# Patient Record
Sex: FEMALE | Race: WHITE | NOT HISPANIC OR LATINO | Employment: FULL TIME | URBAN - METROPOLITAN AREA
[De-identification: names, ages, dates, MRNs, and addresses within clinical notes are randomized per-mention and may not be internally consistent; named-entity substitution may affect disease eponyms.]

---

## 2016-09-27 LAB — CRC RECOMMENDATION EXT: NORMAL

## 2017-03-27 ENCOUNTER — HISTORICAL (OUTPATIENT)
Dept: ADMINISTRATIVE | Facility: HOSPITAL | Age: 57
End: 2017-03-27

## 2017-06-12 ENCOUNTER — HISTORICAL (OUTPATIENT)
Dept: RADIOLOGY | Facility: HOSPITAL | Age: 57
End: 2017-06-12

## 2017-07-23 ENCOUNTER — HOSPITAL ENCOUNTER (OUTPATIENT)
Dept: OCCUPATIONAL THERAPY | Facility: HOSPITAL | Age: 57
End: 2017-07-24
Attending: INTERNAL MEDICINE | Admitting: INTERNAL MEDICINE

## 2017-08-11 ENCOUNTER — HISTORICAL (OUTPATIENT)
Dept: ADMINISTRATIVE | Facility: HOSPITAL | Age: 57
End: 2017-08-11

## 2017-09-19 ENCOUNTER — HISTORICAL (OUTPATIENT)
Dept: ADMINISTRATIVE | Facility: HOSPITAL | Age: 57
End: 2017-09-19

## 2017-09-27 ENCOUNTER — HISTORICAL (OUTPATIENT)
Dept: ADMINISTRATIVE | Facility: HOSPITAL | Age: 57
End: 2017-09-27

## 2018-09-26 ENCOUNTER — HISTORICAL (OUTPATIENT)
Dept: ADMINISTRATIVE | Facility: HOSPITAL | Age: 58
End: 2018-09-26

## 2018-09-26 LAB
ABS NEUT (OLG): 3.56 X10(3)/MCL (ref 2.1–9.2)
ALBUMIN SERPL-MCNC: 3.7 GM/DL (ref 3.4–5)
ALBUMIN/GLOB SERPL: 1.2 RATIO (ref 1.1–2)
ALP SERPL-CCNC: 87 UNIT/L (ref 38–126)
ALT SERPL-CCNC: 28 UNIT/L (ref 12–78)
AST SERPL-CCNC: 12 UNIT/L (ref 15–37)
BASOPHILS # BLD AUTO: 0 X10(3)/MCL (ref 0–0.2)
BASOPHILS NFR BLD AUTO: 1 %
BILIRUB SERPL-MCNC: 1.1 MG/DL (ref 0.2–1)
BILIRUBIN DIRECT+TOT PNL SERPL-MCNC: 0.3 MG/DL (ref 0–0.5)
BILIRUBIN DIRECT+TOT PNL SERPL-MCNC: 0.8 MG/DL (ref 0–0.8)
BUN SERPL-MCNC: 20 MG/DL (ref 7–18)
CALCIUM SERPL-MCNC: 8.7 MG/DL (ref 8.5–10.1)
CHLORIDE SERPL-SCNC: 104 MMOL/L (ref 98–107)
CHOLEST SERPL-MCNC: 139 MG/DL (ref 0–200)
CHOLEST/HDLC SERPL: 2.7 {RATIO} (ref 0–4)
CO2 SERPL-SCNC: 30 MMOL/L (ref 21–32)
CREAT SERPL-MCNC: 0.53 MG/DL (ref 0.55–1.02)
CREAT UR-MCNC: 136 MG/DL
EOSINOPHIL # BLD AUTO: 0.2 X10(3)/MCL (ref 0–0.9)
EOSINOPHIL NFR BLD AUTO: 2 %
ERYTHROCYTE [DISTWIDTH] IN BLOOD BY AUTOMATED COUNT: 14.8 % (ref 11.5–17)
EST. AVERAGE GLUCOSE BLD GHB EST-MCNC: 117 MG/DL
GLOBULIN SER-MCNC: 3 GM/DL (ref 2.4–3.5)
GLUCOSE SERPL-MCNC: 111 MG/DL (ref 74–106)
HBA1C MFR BLD: 5.7 % (ref 4.2–6.3)
HCT VFR BLD AUTO: 41.4 % (ref 37–47)
HDLC SERPL-MCNC: 51 MG/DL (ref 35–60)
HGB BLD-MCNC: 13.2 GM/DL (ref 12–16)
LDLC SERPL CALC-MCNC: 68 MG/DL (ref 0–129)
LYMPHOCYTES # BLD AUTO: 2.2 X10(3)/MCL (ref 0.6–4.6)
LYMPHOCYTES NFR BLD AUTO: 34 %
MCH RBC QN AUTO: 28.3 PG (ref 27–31)
MCHC RBC AUTO-ENTMCNC: 31.9 GM/DL (ref 33–36)
MCV RBC AUTO: 88.7 FL (ref 80–94)
MICROALBUMIN UR-MCNC: 2 MG/DL
MICROALBUMIN/CREAT RATIO PNL UR: 14.7 MG/GM CR (ref 0–30)
MONOCYTES # BLD AUTO: 0.6 X10(3)/MCL (ref 0.1–1.3)
MONOCYTES NFR BLD AUTO: 9 %
NEUTROPHILS # BLD AUTO: 3.56 X10(3)/MCL (ref 1.4–7.9)
NEUTROPHILS NFR BLD AUTO: 54 %
PLATELET # BLD AUTO: 244 X10(3)/MCL (ref 130–400)
PMV BLD AUTO: 8.9 FL (ref 9.4–12.4)
POTASSIUM SERPL-SCNC: 4.2 MMOL/L (ref 3.5–5.1)
PROT SERPL-MCNC: 6.7 GM/DL (ref 6.4–8.2)
RBC # BLD AUTO: 4.67 X10(6)/MCL (ref 4.2–5.4)
SODIUM SERPL-SCNC: 141 MMOL/L (ref 136–145)
TRIGL SERPL-MCNC: 98 MG/DL (ref 30–150)
TSH SERPL-ACNC: 1.48 MIU/L (ref 0.36–3.74)
VLDLC SERPL CALC-MCNC: 20 MG/DL
WBC # SPEC AUTO: 6.6 X10(3)/MCL (ref 4.5–11.5)

## 2020-08-13 ENCOUNTER — HISTORICAL (OUTPATIENT)
Dept: ADMINISTRATIVE | Facility: HOSPITAL | Age: 60
End: 2020-08-13

## 2020-10-02 ENCOUNTER — HISTORICAL (OUTPATIENT)
Dept: ADMINISTRATIVE | Facility: HOSPITAL | Age: 60
End: 2020-10-02

## 2021-01-18 LAB — BCS RECOMMENDATION EXT: NORMAL

## 2021-08-23 ENCOUNTER — HISTORICAL (OUTPATIENT)
Dept: LAB | Facility: HOSPITAL | Age: 61
End: 2021-08-23

## 2022-01-24 LAB
PAP RECOMMENDATION EXT: NORMAL
PAP SMEAR: NORMAL

## 2022-02-16 ENCOUNTER — HISTORICAL (OUTPATIENT)
Dept: LAB | Facility: HOSPITAL | Age: 62
End: 2022-02-16

## 2022-02-16 LAB
ABS NEUT (OLG): 4.05 (ref 2.1–9.2)
ALBUMIN SERPL-MCNC: 3.9 G/DL (ref 3.4–4.8)
ALBUMIN/GLOB SERPL: 1.5 {RATIO} (ref 1.1–2)
ALP SERPL-CCNC: 90 U/L (ref 40–150)
ALT SERPL-CCNC: 41 U/L (ref 0–55)
AST SERPL-CCNC: 26 U/L (ref 5–34)
BILIRUB SERPL-MCNC: 1 MG/DL (ref 0.2–1.2)
BILIRUBIN DIRECT+TOT PNL SERPL-MCNC: 0.4 (ref 0–0.5)
BILIRUBIN DIRECT+TOT PNL SERPL-MCNC: 0.6 (ref 0–0.8)
BUN SERPL-MCNC: 19.2 MG/DL (ref 9.8–20.1)
CALCIUM SERPL-MCNC: 9 MG/DL (ref 8.4–10.2)
CHLORIDE SERPL-SCNC: 106 MMOL/L (ref 98–107)
CHOLEST SERPL-MCNC: 135 MG/DL
CHOLEST/HDLC SERPL: 3 {RATIO} (ref 0–5)
CO2 SERPL-SCNC: 28 MMOL/L (ref 23–31)
CREAT SERPL-MCNC: 0.62 MG/DL (ref 0.57–1.11)
ERYTHROCYTE [DISTWIDTH] IN BLOOD BY AUTOMATED COUNT: 13.2 % (ref 11.5–17)
EST. AVERAGE GLUCOSE BLD GHB EST-MCNC: 122.6 MG/DL
GLOBULIN SER-MCNC: 2.6 G/DL (ref 2.4–3.5)
GLUCOSE SERPL-MCNC: 127 MG/DL (ref 82–115)
HBA1C MFR BLD: 5.9 %
HCT VFR BLD AUTO: 41.1 % (ref 37–47)
HDLC SERPL-MCNC: 45 MG/DL (ref 40–60)
HEMOLYSIS INTERF INDEX SERPL-ACNC: 2
HGB BLD-MCNC: 13.2 G/DL (ref 12–16)
ICTERIC INTERF INDEX SERPL-ACNC: 1
LDLC SERPL CALC-MCNC: 70 MG/DL (ref 50–140)
LIPEMIC INTERF INDEX SERPL-ACNC: -2
MCH RBC QN AUTO: 28.6 PG (ref 27–31)
MCHC RBC AUTO-ENTMCNC: 32.1 G/DL (ref 33–36)
MCV RBC AUTO: 89.2 FL (ref 80–94)
NRBC BLD AUTO-RTO: 0 % (ref 0–0.2)
PLATELET # BLD AUTO: 214 10*3/UL (ref 130–400)
PMV BLD AUTO: 9.2 FL (ref 7.4–10.4)
POTASSIUM SERPL-SCNC: 4.4 MMOL/L (ref 3.5–5.1)
PROT SERPL-MCNC: 6.5 G/DL (ref 5.8–7.6)
RBC # BLD AUTO: 4.61 10*6/UL (ref 4.2–5.4)
SODIUM SERPL-SCNC: 143 MMOL/L (ref 136–145)
TRIGL SERPL-MCNC: 101 MG/DL (ref 0–150)
VLDLC SERPL CALC-MCNC: 20 MG/DL
WBC # SPEC AUTO: 7.4 10*3/UL (ref 4.5–11.5)

## 2022-04-07 ENCOUNTER — HISTORICAL (OUTPATIENT)
Dept: ADMINISTRATIVE | Facility: HOSPITAL | Age: 62
End: 2022-04-07
Payer: COMMERCIAL

## 2022-04-12 ENCOUNTER — HISTORICAL (OUTPATIENT)
Dept: LAB | Facility: HOSPITAL | Age: 62
End: 2022-04-12

## 2022-04-12 LAB
ABS NEUT (OLG): 3.93 (ref 2.1–9.2)
ALBUMIN SERPL-MCNC: 4 G/DL (ref 3.4–4.8)
ALBUMIN/GLOB SERPL: 1.4 {RATIO} (ref 1.1–2)
ALP SERPL-CCNC: 86 U/L (ref 40–150)
ALT SERPL-CCNC: 41 U/L (ref 0–55)
AST SERPL-CCNC: 26 U/L (ref 5–34)
BASOPHILS # BLD AUTO: 0.04 10*3/UL (ref 0–0.2)
BASOPHILS NFR BLD AUTO: 0.5 % (ref 0–1)
BILIRUB SERPL-MCNC: 0.9 MG/DL (ref 0.2–1.2)
BILIRUBIN DIRECT+TOT PNL SERPL-MCNC: 0.4 (ref 0–0.5)
BILIRUBIN DIRECT+TOT PNL SERPL-MCNC: 0.5 (ref 0–0.8)
BUN SERPL-MCNC: 21.1 MG/DL (ref 9.8–20.1)
CALCIUM SERPL-MCNC: 9.7 MG/DL (ref 8.4–10.2)
CHLORIDE SERPL-SCNC: 105 MMOL/L (ref 98–107)
CHOLEST SERPL-MCNC: 146 MG/DL
CHOLEST/HDLC SERPL: 3 {RATIO} (ref 0–5)
CO2 SERPL-SCNC: 28 MMOL/L (ref 23–31)
CREAT SERPL-MCNC: 0.78 MG/DL (ref 0.57–1.11)
EOSINOPHIL # BLD AUTO: 0.22 10*3/UL (ref 0–0.9)
EOSINOPHIL NFR BLD AUTO: 3 % (ref 0–6.4)
ERYTHROCYTE [DISTWIDTH] IN BLOOD BY AUTOMATED COUNT: 13.3 % (ref 11.5–17)
EST. AVERAGE GLUCOSE BLD GHB EST-MCNC: 131.2 MG/DL
GLOBULIN SER-MCNC: 2.9 G/DL (ref 2.4–3.5)
GLUCOSE SERPL-MCNC: 129 MG/DL (ref 82–115)
HBA1C MFR BLD: 6.2 %
HCT VFR BLD AUTO: 41.4 % (ref 37–47)
HDLC SERPL-MCNC: 49 MG/DL (ref 40–60)
HEMOLYSIS INTERF INDEX SERPL-ACNC: 5
HGB BLD-MCNC: 13.4 G/DL (ref 12–16)
ICTERIC INTERF INDEX SERPL-ACNC: 1
IMM GRANULOCYTES # BLD AUTO: 0.02 10*3/UL (ref 0–0.02)
IMM GRANULOCYTES NFR BLD AUTO: 0.3 % (ref 0–0.43)
LDLC SERPL CALC-MCNC: 73 MG/DL (ref 50–140)
LIPEMIC INTERF INDEX SERPL-ACNC: 3
LYMPHOCYTES # BLD AUTO: 2.57 10*3/UL (ref 0.6–4.6)
LYMPHOCYTES NFR BLD AUTO: 34.8 % (ref 16–44)
MANUAL DIFF? (OHS): NO
MCH RBC QN AUTO: 28.3 PG (ref 27–31)
MCHC RBC AUTO-ENTMCNC: 32.4 G/DL (ref 33–36)
MCV RBC AUTO: 87.3 FL (ref 80–94)
MONOCYTES # BLD AUTO: 0.61 10*3/UL (ref 0.1–1.3)
MONOCYTES NFR BLD AUTO: 8.3 % (ref 4–12.1)
NEUTROPHILS # BLD AUTO: 3.93 10*3/UL (ref 2.1–9.2)
NEUTROPHILS NFR BLD AUTO: 53.1 % (ref 43–73)
NRBC BLD AUTO-RTO: 0 % (ref 0–0.2)
PLATELET # BLD AUTO: 192 10*3/UL (ref 130–400)
PMV BLD AUTO: 9 FL (ref 7.4–10.4)
POTASSIUM SERPL-SCNC: 5.2 MMOL/L (ref 3.5–5.1)
PROT SERPL-MCNC: 6.9 G/DL (ref 5.8–7.6)
RBC # BLD AUTO: 4.74 10*6/UL (ref 4.2–5.4)
SODIUM SERPL-SCNC: 143 MMOL/L (ref 136–145)
TRIGL SERPL-MCNC: 121 MG/DL (ref 0–150)
TSH SERPL-ACNC: 3.92 M[IU]/L (ref 0.35–4.94)
VLDLC SERPL CALC-MCNC: 24 MG/DL
WBC # SPEC AUTO: 7.4 10*3/UL (ref 4.5–11.5)

## 2022-04-24 VITALS
DIASTOLIC BLOOD PRESSURE: 60 MMHG | WEIGHT: 237 LBS | HEIGHT: 64 IN | SYSTOLIC BLOOD PRESSURE: 104 MMHG | BODY MASS INDEX: 40.46 KG/M2 | OXYGEN SATURATION: 97 %

## 2022-04-28 NOTE — CONSULTS
DATE OF CONSULTATION:  07/22/2017    ATTENDING PHYSICIAN:  Roni Mckeon MD  CONSULTING PHYSICIAN:  Jose Angel Oliver MD    CHIEF COMPLAINT:  Right flank pain.    HISTORY OF PRESENT ILLNESS:  A 57-year-old female with a known history of proximal right ureteral calculus.  She sees Dr. Drew and is scheduled tomorrow morning for shockwave lithotripsy and stent at [facility], which is where the lithotripsy machine will be.  She came in early this morning with intolerable pain.  Her CT scan was repeated.  She has a large stone completely socked in the upper ureter.  Currently she is relatively comfortable, has no significant complaints.    PHYSICAL EXAMINATION:  VITAL SIGNS:  Temperature 36.7, blood pressure 129/73, pulse 80.   GENERAL:  Awake, alert, and happy in no distress.   CHEST:  Clear.   CARDIOVASCULAR:  Regular rate and rhythm.   ABDOMEN:  Soft, nontender and nondistended.      EXAM: Reveals no significant CVA tenderness.  Her bladder is nonpalpable.   EXTREMITIES:  No clubbing, cyanosis or edema.    LABORATORY DATA:  She has a white blood cell count 11.6, platelets 240, hemoglobin 13.3, hematocrit 41.1.    IMPRESSION:  Proximal right ureteral calculus with moderate hydronephrosis.  Her pain is currently under control.  My recommendation is to discharge her in the morning at 8 o'clock, and she can go immediately to [facility] for her scheduled stent placement and shock wave lithotripsy.        ______________________________  MD XAVI Rachel/SD  DD:  07/23/2017  Time:  09:10AM  DT:  07/23/2017  Time:  12:15PM  Job #:  [id number]

## 2022-04-28 NOTE — H&P
Patient:   Lauren Kaba            MRN: [id number]            FIN: [id number]               Age:   57 years     Sex:  Female     :  1960   Associated Diagnoses:   None   Author:   Rod Kapoor MD      Basic Information   Source of history:  Self.       Chief Complaint   kidney stone    HPI: 57-year-old  female patient of Dr. Roni Mckeon presented to the ER on 2017 with complaints of right lower quadrant pain. She states it frightened her because all of her pain previously had been in the right flank and she was concerned that she might actually have appendicitis. She is scheduled for lithotripsy with Dr. Miguel Drew on Monday, . She was admitted under observation status for nephrolithiasis as well as hydronephrosis. She voices no complaints this morning. CT scan of the abdomen and pelvis that was performed on admit revealed a 9 x 6mm stone at the right UPJ producing mild to moderate right hydronephrosis. This is relatively unchanged from previous CAT scan in . She is a diabetic under good control with a good hemoglobin A1c. She only takes 2 doses of metformin daily. She voices no acute complaints this morning.      Review of Systems   Constitutional:  No fever, No chills, No sweats, No weakness, No fatigue.    Eye:  No recent visual problem, No blurring, No double vision.    Ear/Nose/Mouth/Throat:  No decreased hearing, No nasal congestion, No sore throat.    Respiratory:  No shortness of breath, No cough, No sputum production, No hemoptysis.    Cardiovascular:  No chest pain, No palpitations, No peripheral edema.    Gastrointestinal:  No nausea, No vomiting, No diarrhea, No constipation     Abdominal pain: Right, Lower quadrant, The severity is moderate, Characterized as ( Cramping/colicky ).    Genitourinary:  No dysuria, No hematuria, No change in urine stream.    Hematology/Lymphatics:  No bruising tendency, No bleeding tendency, No swollen lymph  glands.    Endocrine:  No excessive thirst, No polyuria, No cold intolerance, No heat intolerance.    Musculoskeletal:  No back pain, No joint pain, No muscle pain, No decreased range of motion.    Integumentary:  No rash, No pruritus.    Neurologic:  No abnormal balance, No confusion, No numbness, No tingling, No headache.    Psychiatric:  No anxiety, No depression.       Health Status   Allergies:    Allergic Reactions (Selected)  No Known Allergies   Current medications:  (Selected)   Inpatient Medications  Ordered  Dextrose 10% in Water: 125 mL, IV, PRN blood glucose, start date 07/23/17 6:03:00 CDT  Dextrose 10% in Water: 125 mL, IV, PRN blood glucose, start date 07/23/17 6:04:00 CDT  Dextrose 10% in Water: 125 mL, IV, PRN blood glucose, start date 07/23/17 6:04:00 CDT  Dextrose 10% in Water: 250 mL, IV, PRN blood glucose, start date 07/23/17 6:04:00 CDT  Dilaudid: 0.5 mg, form: Injection, IV, q4hr PRN for pain, first dose 07/23/17 5:56:00 CDT, severe ( > 7 on pain scale), 26,051  Dilaudid: 1 mg, form: Injection, IV, q4hr PRN for pain, first dose 07/23/17 5:56:00 CDT, severe ( > 7 on pain scale), 26,051  NS 1,000 mL: 1,000 mL, 1,000 mL, IV, 1,000 mL/hr, start date 07/23/17 2:46:00 CDT  Sodium Chloride 0.9% 1,000 mL: 1,000 mL, 1,000 mL, IV, 75 mL/hr, start date 07/23/17 5:56:00 CDT  Zofran: 4 mg, form: Injection, IV Push, q4hr PRN for nausea, first dose 07/23/17 5:56:00 CDT, 26,051  acetaminophen: 650 mg, form: Tab, Oral, q6hr PRN for fever, first dose 07/23/17 5:56:00 CDT, > 38.1 degrees Celsius (100.6 degrees Fahrenheit), 30,022  glucagon: 1 mg, form: Injection, IM, q10min PRN for blood glucose, first dose 07/23/17 5:56:00 CDT, Conscious Patient with NO IV access available and BG < 45 mg/dl., 58  glucagon: 1 mg, form: Injection, IM, q10min PRN for blood glucose, first dose 07/23/17 5:56:00 CDT, Unconscious patient: Patient with NO IV access available and BG < 70 mg/dl., 58  insulin lispro: 2-14 units, form:  Injection, Subcutaneous, As Directed PRN for blood glucose, first dose 07/23/17 5:56:00 CDT  Prescriptions  Prescribed  meloxicam 7.5 mg oral tablet: See Instructions, TAKE ONE TABLET BY MOUTH EVERY DAY, # 30 tab(s), 2 Refill(s), eRx: [id number]  metFORMIN 500 mg oral tablet, extended release: See Instructions, TAKE ONE TABLET BY MOUTH EVERY DAY, # 30 tab(s), 6 Refill(s), eRx: [id number]  Documented Medications  Documented  Multiple Vitamins oral cap: 1 cap(s), Oral, Daily, # 30 cap(s), 0 Refill(s)  Vitamin C 500 mg oral tablet: 500 mg = 1 tab(s), Oral, Daily, # 30 tab(s), 0 Refill(s)  Vitamin D 1,000 Units Tab: = 1 tab(s), Oral, Daily, # 30 tab(s), 0 Refill(s)  glucosamine 500 mg oral capsule: 500 mg = 1 cap(s), Oral, Daily, # 30 cap(s), 0 Refill(s)      Histories   Past Medical History:    No active or resolved past medical history items have been selected or recorded., Diabetes mellitus   Family History:    Father  Diabetes mellitus type 2  Cancer  Hypertension.  Mother  Diabetes mellitus type 2  Hypertension.     Procedure history:    Cardiac ablation using fluoroscopy guidance ([id number]).   Social History        Social & Psychosocial Habits    Alcohol  05/03/2016  Use: Never    Employment/School  05/03/2016  Status: Employed    Home/Environment  05/03/2016  Lives with: Alone    Nutrition/Health  05/03/2016  Type of diet: Regular    Substance Abuse  05/03/2016  Use: Never    Tobacco  05/03/2016  Use: Never smoker  .        Physical Examination   Intake and Output   Reviewed Results:       General:  Alert and oriented, No acute distress.    Eye:  Pupils are equal, round and reactive to light, Extraocular movements are intact, Normal conjunctiva.    HENT:  Normocephalic.    Neck:  Supple, Non-tender.    Respiratory:  Lungs are clear to auscultation, Respirations are non-labored, Breath sounds are equal, Symmetrical chest wall expansion.    Cardiovascular:  Normal rate, Regular rhythm, No  murmur, No gallop, Good pulses equal in all extremities, No edema.    Gastrointestinal:  Soft, Non-tender, Non-distended, Normal bowel sounds, No organomegaly.       Vital Signs (last 24 hrs)_____  Last Charted___________  Temp Oral     36.7 DegC  (JUL 23 07:28)  Heart Rate Peripheral   80 bpm  (JUL 23 07:28)  Resp Rate         18 br/min  (JUL 23 07:28)  SBP      129 mmHg  (JUL 23 07:28)  DBP      73 mmHg  (JUL 23 07:28)  SpO2      98 %  (JUL 23 07:28)     Genitourinary:  Minimal CVA tenderness on the right.    Musculoskeletal:  No swelling, No deformity.    Integumentary:  Warm, Moist.    Neurologic:  Alert, No focal deficits.    Cognition and Speech:  Oriented, Speech clear and coherent.    Psychiatric:  Cooperative.       Review / Management   Results review:     Labs (Last four charted values)  Glucose              H 170 (JUL 23)   PT                   13.0 (JUL 23)   INR                  1.00 (JUL 23)   PTT                  26.6 (JUL 23) .    Laboratory Results   Today's Lab Results : PowerNote Discrete Results   7/23/2017 2:53 CDT       WBC                       11.6 x10(3)/mcL  HI                             RBC                       4.80 x10(6)/mcL                             Hgb                       13.3 gm/dL                             Hct                       41.1 %                             Platelet                  240 x10(3)/mcL                             MCV                       85.6 fL                             MCH                       27.7 pg                             MCHC                      32.4 gm/dL  LOW                             RDW                       14.2 %                             MPV                       8.9 fL  LOW                             Abs Neut                  9.27 x10(3)/mcL  HI                             Neutro Auto               80 %  NA                             Lymph Auto                11 %  NA                             Mono Auto                 8 %   NA                             Eos Auto                  0 %  NA                             Abs Eos                   0.0 x10(3)/mcL                             Basophil Auto             0 %  NA                             Abs Neutro                9.27 x10(3)/mcL  HI                             Abs Lymph                 1.3 x10(3)/mcL                             Abs Mono                  0.9 x10(3)/mcL                             Abs Baso                  0.0 x10(3)/mcL                             PT                        13.0 second(s)                             INR                       1.00                             PTT                       26.6 second(s)                             Sodium Lvl                141 mmol/L                             Potassium Lvl             3.8 mmol/L                             Chloride                  105 mmol/L                             CO2                       24.0 mmol/L                             Calcium Lvl               9.0 mg/dL                             Glucose Lvl               170 mg/dL  HI                             BUN                       25.0 mg/dL  HI                             Creatinine                0.72 mg/dL                             eGFR-AA                   >60 mL/min/1.73 m2  NA                             eGFR-LORE                  >60 mL/min/1.73 m2  NA                             Bili Total                0.8 mg/dL                             Bili Direct               0.20 mg/dL                             Bili Indirect             0.60 mg/dL                             AST                       20 unit/L                             ALT                       36 unit/L                             Alk Phos                  112 unit/L                             Total Protein             7.3 gm/dL                             Albumin Lvl               4.20 gm/dL                             Globulin                  3.10 gm/dL                              A/G Ratio                 1.4  NA    7/23/2017 2:40 CDT       UA Appear                 CLEAR                             UA Color                  YELLOW                             UA Spec Grav              1.015                             UA Bili                   Negative                             UA pH                     6.5                             UA Urobilinogen           0.2                             UA Blood                  3+                             UA Glucose                Negative                             UA Ketones                Negative                             UA Protein                Trace                             UA Nitri                  Negative                             UA Leuk Est               1+                             UA WBC cnt                13 /HPF  HI                             UA RBC                    52 /HPF  HI                             UA Bact                   NONE SEEN /HPF                             UA Squam Epithelial       NONE SEEN           Impression and Plan   Diagnosis     . Nephrolithiasis; 9x6mm  . Moderate hydronephrosis  . Diabetes mellitus; controlled    Patient placed in observation status  Scheduled for lithotripsy tomorrow at [facility] with Miguel Drew  We'll await urology evaluation;  if okay with urology patient cleared for discharge with plans for procedure tomorrow as previously scheduled    We will con't to monitor for any encephalopathic changes, hemodynamic parameters, oxygenation, supplement oxygen as needed, fecal/urinary output, electrolytes, H/H, and transfuse as needed.  Images and images' reports have been reviewed; labs have been reviewed.

## 2022-04-28 NOTE — DISCHARGE SUMMARY
Patient:   Lauren Kaba            MRN: [id number]            FIN: [id number]               Age:   57 years     Sex:  Female     :  1960   Associated Diagnoses:   None   Author:   Rod Kapoor MD      Chief Complaint   Admit diagnosis 17:  . Nephrolithiasis; 9x6mm  . Moderate hydronephrosis  . Diabetes mellitus; controlled    Discharge diagnosis 17:  . Nephrolithiasis; 9x6mm  . Moderate hydronephrosis  . Diabetes mellitus; controlled    Course of hospital stay: 57-year-old  female patient of Dr. Roni Mckeon presented to the ER on 2017 with complaints of right lower quadrant pain. She states it frightened her because all of her pain previously had been in the right flank and she was concerned that she might actually have appendicitis. She is scheduled for lithotripsy with Dr. Miguel Drew on . She was admitted under observation status for nephrolithiasis as well as hydronephrosis. She voices no complaints this morning. CT scan of the abdomen and pelvis that was performed on admit revealed a 9 x 6mm stone at the right UPJ producing mild to moderate right hydronephrosis. This is relatively unchanged from previous CAT scan in . She is a diabetic under good control with a good hemoglobin A1c. She only takes 2 doses of metformin daily. She was evaluated by Dr. Kolby Page on 2017 and agrees with plans for lithotripsy  so we'll be discharging her this morning, patient set up to be a part Place by 9 AM. At the time of discharge all questions have been answered, medications reconciled, follow-up appointment to have been made. Patient to present to [facility] immediately following discharge. DC time > 32 minutes      Review of Systems   Constitutional:  No fever, No chills, No sweats, No weakness, No fatigue.    Eye:  No recent visual problem, No blurring, No double vision.    Ear/Nose/Mouth/Throat:  No decreased hearing, No  nasal congestion, No sore throat.    Respiratory:  No shortness of breath, No cough, No sputum production, No hemoptysis.    Cardiovascular:  No chest pain, No palpitations, No peripheral edema.    Gastrointestinal:  No nausea, No vomiting, No diarrhea, No constipation     Abdominal pain: Right, Lower quadrant, The pain is mild, Characterized as ( Cramping/colicky ).    Genitourinary:  No dysuria, No hematuria, No change in urine stream.    Hematology/Lymphatics:  No bruising tendency, No bleeding tendency, No swollen lymph glands.    Endocrine:  No excessive thirst, No polyuria, No cold intolerance, No heat intolerance.    Musculoskeletal:  No back pain, No joint pain, No muscle pain, No decreased range of motion.    Integumentary:  No rash, No pruritus.    Neurologic:  No abnormal balance, No confusion, No numbness, No tingling, No headache.    Psychiatric:  No anxiety, No depression.       Health Status   Allergies:    Allergic Reactions (Selected)  No Known Allergies   Current medications:  (Selected)   Inpatient Medications  Ordered  Dextrose 10% in Water: 125 mL, IV, PRN blood glucose, start date 07/23/17 6:03:00 CDT  Dextrose 10% in Water: 125 mL, IV, PRN blood glucose, start date 07/23/17 6:04:00 CDT  Dextrose 10% in Water: 125 mL, IV, PRN blood glucose, start date 07/23/17 6:04:00 CDT  Dextrose 10% in Water: 250 mL, IV, PRN blood glucose, start date 07/23/17 6:04:00 CDT  Dilaudid: 0.5 mg, form: Injection, IV, q4hr PRN for pain, first dose 07/23/17 5:56:00 CDT, severe ( > 7 on pain scale), 26,051  Dilaudid: 1 mg, form: Injection, IV, q4hr PRN for pain, first dose 07/23/17 5:56:00 CDT, severe ( > 7 on pain scale), 26,051  Sodium Chloride 0.9% 1,000 mL: 1,000 mL, 1,000 mL, IV, 75 mL/hr, start date 07/23/17 5:56:00 CDT  Vitamin C 500 mg oral tablet: 500 mg, form: Tab, Oral, Daily, first dose 07/24/17 9:00:00 CDT, 23  Zofran: 4 mg, form: Injection, IV Push, q4hr PRN for nausea, first dose 07/23/17 5:56:00 CDT,  26,051  acetaminophen: 650 mg, form: Tab, Oral, q6hr PRN for fever, first dose 07/23/17 5:56:00 CDT, > 38.1 degrees Celsius (100.6 degrees Fahrenheit), 30,022  glucagon: 1 mg, form: Injection, IM, q10min PRN for blood glucose, first dose 07/23/17 5:56:00 CDT, Conscious Patient with NO IV access available and BG < 45 mg/dl., 58  glucagon: 1 mg, form: Injection, IM, q10min PRN for blood glucose, first dose 07/23/17 5:56:00 CDT, Unconscious patient: Patient with NO IV access available and BG < 70 mg/dl., 58  insulin lispro: 2-14 units, form: Injection, Subcutaneous, As Directed PRN for blood glucose, first dose 07/23/17 5:56:00 CDT  Prescriptions  Prescribed  meloxicam 7.5 mg oral tablet: See Instructions, TAKE ONE TABLET BY MOUTH EVERY DAY, # 30 tab(s), 2 Refill(s), eRx: [facility]  metFORMIN 500 mg oral tablet, extended release: See Instructions, TAKE ONE TABLET BY MOUTH EVERY DAY, # 30 tab(s), 6 Refill(s), eRx: [facility]  Documented Medications  Documented  Multiple Vitamins oral cap: 1 cap(s), Oral, Daily, # 30 cap(s), 0 Refill(s)  Vitamin C 500 mg oral tablet: 500 mg = 1 tab(s), Oral, Daily, # 30 tab(s), 0 Refill(s)  Vitamin D 1,000 Units Tab: = 1 tab(s), Oral, Daily, # 30 tab(s), 0 Refill(s)  glucosamine 500 mg oral capsule: 500 mg = 1 cap(s), Oral, Daily, # 30 cap(s), 0 Refill(s)      Physical Examination   Intake and Output   Fluid Balance Primitives   7/23/2017 23:00 CDT      Oral Intake               0 mL                             Urine Count               1                             Stool Count               0    7/23/2017 15:00 CDT      Oral Intake               900 mL                             Urine Count               4                             Stool Count               0    7/23/2017 7:00 CDT       Oral Intake               600 mL                             Urine Count               3                             Stool Count               1        General:  Alert and  oriented, No acute distress.    Eye:  Pupils are equal, round and reactive to light, Extraocular movements are intact, Normal conjunctiva.    HENT:  Normocephalic.    Neck:  Supple, Non-tender.    Respiratory:  Lungs are clear to auscultation, Respirations are non-labored, Breath sounds are equal, Symmetrical chest wall expansion.    Cardiovascular:  Normal rate, Regular rhythm, No murmur, No gallop, Good pulses equal in all extremities, No edema.    Gastrointestinal:  Soft, Non-tender, Non-distended, Normal bowel sounds, No organomegaly.       Vital Signs (last 24 hrs)_____  Last Charted___________  Temp Oral     36.7 DegC  (JUL 24 07:20)  Heart Rate Peripheral   86 bpm  (JUL 24 07:20)  Resp Rate         20 br/min  (JUL 24 04:18)  SBP      120 mmHg  (JUL 24 07:20)  DBP      75 mmHg  (JUL 24 07:20)  SpO2      97 %  (JUL 24 07:20)     Genitourinary:  Minimal CVA tenderness on the right.    Musculoskeletal:  No swelling, No deformity.    Integumentary:  Warm, Moist.    Neurologic:  Alert, No focal deficits.    Cognition and Speech:  Oriented, Speech clear and coherent.    Psychiatric:  Cooperative.       Review / Management   Results review:     Labs (Last four charted values)  Glucose              H 170 (JUL 23)   PT                   13.0 (JUL 23)   INR                  1.00 (JUL 23)   PTT                  26.6 (JUL 23) .    Laboratory Results   Today's Lab Results : PowerNote Discrete Results   7/23/2017 19:38 CDT      POC CBG                   123 mg/dL  HI    7/23/2017 19:00 CDT      Blood Glucose Testing Reason              Routine                             Blood Glucose, POC        123 mg/dL  HI    7/23/2017 16:29 CDT      POC CBG                   143 mg/dL  HI    7/23/2017 2:53 CDT       WBC                       11.6 x10(3)/mcL  HI                             RBC                       4.80 x10(6)/mcL                             Hgb                       13.3 gm/dL                             Hct                        41.1 %                             Platelet                  240 x10(3)/mcL                             MCV                       85.6 fL                             MCH                       27.7 pg                             MCHC                      32.4 gm/dL  LOW                             RDW                       14.2 %                             MPV                       8.9 fL  LOW                             Abs Neut                  9.27 x10(3)/mcL  HI                             Neutro Auto               80 %  NA                             Lymph Auto                11 %  NA                             Mono Auto                 8 %  NA                             Eos Auto                  0 %  NA                             Abs Eos                   0.0 x10(3)/mcL                             Basophil Auto             0 %  NA                             Abs Neutro                9.27 x10(3)/mcL  HI                             Abs Lymph                 1.3 x10(3)/mcL                             Abs Mono                  0.9 x10(3)/mcL                             Abs Baso                  0.0 x10(3)/mcL                             PT                        13.0 second(s)                             INR                       1.00                             PTT                       26.6 second(s)                             Sodium Lvl                141 mmol/L                             Potassium Lvl             3.8 mmol/L                             Chloride                  105 mmol/L                             CO2                       24.0 mmol/L                             Calcium Lvl               9.0 mg/dL                             Glucose Lvl               170 mg/dL  HI                             BUN                       25.0 mg/dL  HI                             Creatinine                0.72 mg/dL                             eGFR-AA                   >60 mL/min/1.73 m2  NA                              eGFR-LORE                  >60 mL/min/1.73 m2  NA                             Bili Total                0.8 mg/dL                             Bili Direct               0.20 mg/dL                             Bili Indirect             0.60 mg/dL                             AST                       20 unit/L                             ALT                       36 unit/L                             Alk Phos                  112 unit/L                             Total Protein             7.3 gm/dL                             Albumin Lvl               4.20 gm/dL                             Globulin                  3.10 gm/dL                             A/G Ratio                 1.4  NA    7/23/2017 2:40 CDT       UA Appear                 CLEAR                             UA Color                  YELLOW                             UA Spec Grav              1.015                             UA Bili                   Negative                             UA pH                     6.5                             UA Urobilinogen           0.2                             UA Blood                  3+                             UA Glucose                Negative                             UA Ketones                Negative                             UA Protein                Trace                             UA Nitri                  Negative                             UA Leuk Est               1+                             UA WBC cnt                13 /HPF  HI                             UA RBC                    52 /HPF  HI                             UA Bact                   NONE SEEN /HPF                             UA Squam Epithelial       NONE SEEN

## 2022-04-28 NOTE — ED PROVIDER NOTES
Patient:   Lauren Kaba            MRN: [id number]            FIN: [id number]               Age:   57 years     Sex:  Female     :  1960   Associated Diagnoses:   Ureterolithiasis   Author:   Nathaniel Leger      Basic Information   Time seen: Date & time 2017 03:11:00.   History source: Patient.   Arrival mode: Private vehicle.   History limitation: None.   Additional information: Chief Complaint from Nursing Triage Note : Chief Complaint   2017 2:16 CDT       Chief Complaint           Patient complaint of right flank pain, nausea and vomiting.  Patient is scheduled for lithotripsy this monday.  Symptoms started at 0000  .      History of Present Illness   The patient presents with flank pain.  The onset was 3  hours ago.  The course/duration of symptoms is fluctuating in intensity.  The character of symptoms is sharp.  The degree at onset was moderate.  The Location of pain at onset was right and flank.  The degree at present is minimal.  The Location of pain at present is right and flank.  Radiating pain: none. The exacerbating factor is none.  The relieving factor is none.  Therapy today: none.  Risk factors consist of none.  Associated symptoms: none.        Review of Systems   Constitutional symptoms:  No fever, no chills.    Respiratory symptoms:  No shortness of breath, no cough.    Cardiovascular symptoms:  No chest pain, no palpitations.    Gastrointestinal symptoms:  Right flank, pain, no vomiting, no diarrhea.    Musculoskeletal symptoms:  No back pain,    Neurologic symptoms:  No altered level of consciousness, no weakness.              Additional review of systems information: All other systems reviewed and otherwise negative.      Health Status   Allergies:    Allergic Reactions (All)  No Known Allergies.   Medications:  (Selected)   Inpatient Medications  Ordered  NS 1,000 mL: 1,000 mL, 1,000 mL, IV, 1,000 mL/hr, start date 17 2:46:00  CDT  Prescriptions  Prescribed  meloxicam 7.5 mg oral tablet: See Instructions, TAKE ONE TABLET BY MOUTH EVERY DAY, # 30 tab(s), 2 Refill(s), eRx: [facility]  metFORMIN 500 mg oral tablet, extended release: See Instructions, TAKE ONE TABLET BY MOUTH EVERY DAY, # 30 tab(s), 6 Refill(s), eRx: [facility]  Documented Medications  Documented  Multiple Vitamins oral cap: 1 cap(s), Oral, Daily, # 30 cap(s), 0 Refill(s)  Vitamin C 500 mg oral tablet: 500 mg = 1 tab(s), Oral, Daily, # 30 tab(s), 0 Refill(s)  Vitamin D 1,000 Units Tab: = 1 tab(s), Oral, Daily, # 30 tab(s), 0 Refill(s)  glucosamine 500 mg oral capsule: 500 mg = 1 cap(s), Oral, Daily, # 30 cap(s), 0 Refill(s), per nurse's notes.   Immunizations: Per nurse's notes.   Menstrual history: Per nurse's notes.      Past Medical/ Family/ Social History   Medical history: Reviewed as documented in chart.   Surgical history:    Open total meniscectomy of knee ([id number]).  Breast reduction, bilateral ([id number]).  Cardiac ablation using fluoroscopy guidance ([id number])., Reviewed as documented in chart.   Family history:    Diabetes mellitus type 2  Mother  Father  Cancer  Father  Hypertension.  Father  Mother  , Reviewed as documented in chart.   Social history: Reviewed as documented in chart.   Problem list: Per nurse's notes.      Physical Examination               Vital Signs   Vital Signs   7/23/2017 2:16 CDT       Temperature Oral          36.3 DegC                             Peripheral Pulse Rate     105 bpm  HI                             Respiratory Rate          18 br/min                             SpO2                      97 %                             Oxygen Therapy            Room air                             Systolic Blood Pressure   172 mmHg  HI                             Diastolic Blood Pressure  85 mmHg  .   Measurements   7/23/2017 2:16 CDT       Weight Dosing             112 kg                             Weight  Measured and Calculated in Lbs     246.92 lb                             Weight Estimated          112 kg                             Height/Length Dosing      163 cm                             Height/Length Estimated   163 cm                             Body Mass Index Estimated 42.15 kg/m2  .   General:  Alert, no acute distress, well hydrated, Skin: Normal for ethnicity.    Skin:  Warm, dry, pink, intact.    Head:  Normocephalic.   Neck:  Supple, no tenderness, full range of motion.    Eye:  Pupils are equal, round and reactive to light, extraocular movements are intact, normal conjunctiva.    Cardiovascular:  Regular rate and rhythm, No murmur, Normal peripheral perfusion.    Respiratory:  Lungs are clear to auscultation, breath sounds are equal, Respirations: not tachypneic, not labored, not shallow, Retractions: None.    Chest wall:  No tenderness.   Back:  Normal range of motion, Normal alignment, No costovertebral angle tenderness,    Musculoskeletal:  Normal ROM, normal strength, no swelling, no deformity.    Gastrointestinal:  Soft, Non distended, Tenderness: Mild, right flank, Guarding: Negative, Rebound: Negative, Bowel sounds: Normal, Organomegaly: Negative, Trauma: Negative, Mass: Negative, Scars: Negative, Signs: None.    Neurological:  Alert and oriented to person, place, time, and situation, No focal neurological deficit observed, normal sensory observed, normal motor observed, normal speech observed, normal coordination observed, Gait: Normal.    Psychiatric:  Cooperative, appropriate mood & affect, normal judgment.       Medical Decision Making   Documents reviewed:  Emergency department nurses' notes.   Orders  Launch Orders   Radiology:  CT Stone W/O Contrast (Order): Stat, 7/23/2017 3:34 CDT, Other (please specify), RLQ pain/Rt Flank Pain, None, Stretcher, Patient Has IV?, Right flank pain and right lower quadrant pain, Rad Type, Schedule this test, [facility].      Impression and Plan    Diagnosis   Ureterolithiasis ([id number] N20.1)      Calls-Consults   -  7/23/2017 05:17:00 , Linda YANEZ MD, [facility], recommends Spoke to [facility] and they accept admission.    Plan   Condition: Stable.    Disposition: Admit time  7/23/2017 05:18:00, Place in Observation Unit.    Counseled: Patient, Regarding diagnosis, Regarding diagnostic results, Regarding treatment plan, Regarding prescription, Patient indicated understanding of instructions.    Orders: Launch Orders   Admit/Transfer/Discharge:  Admit to Outpatient Observation (Order): 7/23/2017 5:18 CDT, Linda YANEZ MD, [facility], No.    Notes: Admitted in conjunction with Dr. Dawn.

## 2022-05-09 ENCOUNTER — TELEPHONE (OUTPATIENT)
Dept: INTERNAL MEDICINE | Facility: CLINIC | Age: 62
End: 2022-05-09
Payer: COMMERCIAL

## 2022-05-09 RX ORDER — CEFDINIR 300 MG/1
300 CAPSULE ORAL 2 TIMES DAILY
Qty: 14 CAPSULE | Refills: 0 | Status: CANCELLED | OUTPATIENT
Start: 2022-05-09 | End: 2022-05-16

## 2022-05-09 RX ORDER — CEFDINIR 300 MG/1
300 CAPSULE ORAL EVERY 12 HOURS
Qty: 14 EACH | Refills: 0 | Status: SHIPPED | OUTPATIENT
Start: 2022-05-09 | End: 2022-05-16

## 2022-07-08 ENCOUNTER — TELEPHONE (OUTPATIENT)
Dept: INTERNAL MEDICINE | Facility: CLINIC | Age: 62
End: 2022-07-08
Payer: COMMERCIAL

## 2022-07-26 LAB
LEFT EYE DM RETINOPATHY: NEGATIVE
RIGHT EYE DM RETINOPATHY: NEGATIVE

## 2022-09-19 ENCOUNTER — DOCUMENTATION ONLY (OUTPATIENT)
Dept: INTERNAL MEDICINE | Facility: CLINIC | Age: 62
End: 2022-09-19
Payer: COMMERCIAL

## 2022-09-30 ENCOUNTER — DOCUMENTATION ONLY (OUTPATIENT)
Dept: INTERNAL MEDICINE | Facility: CLINIC | Age: 62
End: 2022-09-30
Payer: COMMERCIAL

## 2022-10-03 ENCOUNTER — HOSPITAL ENCOUNTER (EMERGENCY)
Facility: HOSPITAL | Age: 62
Discharge: HOME | End: 2022-10-03
Attending: EMERGENCY MEDICINE
Payer: COMMERCIAL

## 2022-10-03 VITALS
SYSTOLIC BLOOD PRESSURE: 148 MMHG | HEIGHT: 64 IN | TEMPERATURE: 98 F | OXYGEN SATURATION: 99 % | BODY MASS INDEX: 42.68 KG/M2 | HEART RATE: 89 BPM | DIASTOLIC BLOOD PRESSURE: 96 MMHG | WEIGHT: 250 LBS | RESPIRATION RATE: 16 BRPM

## 2022-10-03 DIAGNOSIS — S09.90XA CLOSED HEAD INJURY, INITIAL ENCOUNTER: ICD-10-CM

## 2022-10-03 DIAGNOSIS — S00.83XA CONTUSION OF FOREHEAD, INITIAL ENCOUNTER: Primary | ICD-10-CM

## 2022-10-03 PROCEDURE — 99284 EMERGENCY DEPT VISIT MOD MDM: CPT | Mod: 25

## 2022-10-03 NOTE — DISCHARGE INSTRUCTIONS
Tylenol for discomfort if needed. Bruising may get worse before better, will likely change colors as well. If your symptoms change/worsen, return to ER.

## 2022-10-03 NOTE — ED PROVIDER NOTES
Encounter Date: 10/3/2022       History     Chief Complaint   Patient presents with    Head Injury     POV post head injury on Friday, reports got hit in forehead and had bruising. Woke up with black eyes this AM. Denies any neuro changes or inc pain. On eliquis     63 y/o female who presents with a plastic plate tray falling and hit her in the forehead Friday. No loc. +hematoma. She is on thinners (hx afib). Yesterday she woke up and noticed some bruising around the eyes and today even worse. Concerned she is having a head bleed. No dizziness, no vomiting, no confusion, no change in vision.     The history is provided by the patient. No  was used.   Head Injury   Illness onset: friday. She came to the ER via by private vehicle. The injury mechanism was a direct blow. There was no loss of consciousness. There was no blood loss. The quality of the pain is described as dull. The pain is at a severity of 3/10. Pertinent negatives include no numbness, no vomiting, no disorientation and no weakness. She has tried nothing for the symptoms.   Review of patient's allergies indicates:   Allergen Reactions    Cipro [ciprofloxacin hcl] Hives     No past medical history on file.  No past surgical history on file.  No family history on file.     Review of Systems   Gastrointestinal:  Negative for vomiting.   Neurological:  Negative for weakness and numbness.        Head injury   All other systems reviewed and are negative.    Physical Exam     Initial Vitals [10/03/22 0911]   BP Pulse Resp Temp SpO2   (!) 151/99 (!) 115 18 98.2 °F (36.8 °C) 97 %      MAP       --         Physical Exam    Nursing note and vitals reviewed.  Constitutional: She appears well-developed and well-nourished.   HENT:   Head: Head is with contusion. Head is without Majano's sign.       Eyes: Conjunctivae and EOM are normal. Pupils are equal, round, and reactive to light.   Neck:   Normal range of motion.  Cardiovascular:  Normal rate,  regular rhythm and normal heart sounds.           Pulmonary/Chest: Breath sounds normal. No respiratory distress.   Musculoskeletal:         General: Normal range of motion.      Cervical back: Normal range of motion.     Neurological: She is alert and oriented to person, place, and time. She has normal strength.   Skin: Skin is warm and dry.   Psychiatric: She has a normal mood and affect.       ED Course   Procedures  Labs Reviewed - No data to display       Imaging Results              CT Head Without Contrast (Final result)  Result time 10/03/22 10:17:23      Final result by Simone Lay MD (10/03/22 10:17:23)                   Impression:      No acute abnormality.      Electronically signed by: Maru Lay MD  Date:    10/03/2022  Time:    10:17               Narrative:    EXAMINATION:  CT HEAD WITHOUT CONTRAST    CLINICAL HISTORY:  Facial trauma, blunt;Transient ischemic attack (TIA);    TECHNIQUE:  Low dose axial CT images obtained throughout the head without intravenous contrast. Sagittal and coronal reconstructions were performed.  .  Automated exposure control used.    COMPARISON:  None.    FINDINGS:  Intracranial compartment:    Ventricles and sulci are normal in size for age without evidence of hydrocephalus. No extra-axial blood or fluid collections.    The brain parenchyma appears normal. No parenchymal mass, hemorrhage, edema or major vascular distribution infarct.    Skull/extracranial contents (limited evaluation): No fracture. Small anterior midline frontal scalp hematoma.                                       Medications - No data to display  Medical Decision Making:   Clinical Tests:   Radiological Study: Ordered and Reviewed  ED Management:  Head CT negative for bleed, fracture. Hematoma noted to forehead with some bruising around the eyes more medial. No tenderness to eye area. Appears gravity has pulled bleeding from hematoma. Neurologically intact.   Additional MDM:    Differential Diagnosis:   Other: The following diagnoses were also considered and will be evaluated: forehead contusion, head bleed and skull fracture.                       Clinical Impression:   Final diagnoses:  [S00.83XA] Contusion of forehead, initial encounter (Primary)  [S09.90XA] Closed head injury, initial encounter      ED Disposition Condition    Discharge Stable          ED Prescriptions    None       Follow-up Information       Follow up With Specialties Details Why Contact Info    Roni Mckeon II, MD Internal Medicine Call in 1 week As needed, If symptoms worsen [address] [facility] [id number]  [phone]               Sandrita Dinh, PAUL  10/03/22 1104

## 2022-10-03 NOTE — FIRST PROVIDER EVALUATION
Medical screening examination initiated.  I have conducted a focused provider triage encounter, findings are as follows:    Brief history of present illness:  61 y/o female who presents with forehead injury Friday. No loc. No dizziness, no vomiting, no confustion. She is concerned because she now has bruising around her eyes so concerned.     There were no vitals filed for this visit.    Pertinent physical exam:  alert, nonlabored, ambulatory with steady gait, bruising to around the eyes and forehead    Brief workup plan:  images    Preliminary workup initiated; this workup will be continued and followed by the physician or advanced practice provider that is assigned to the patient when roomed.

## 2022-10-09 ENCOUNTER — DOCUMENTATION ONLY (OUTPATIENT)
Dept: INTERNAL MEDICINE | Facility: CLINIC | Age: 62
End: 2022-10-09
Payer: COMMERCIAL

## 2022-10-11 ENCOUNTER — TELEPHONE (OUTPATIENT)
Dept: INTERNAL MEDICINE | Facility: CLINIC | Age: 62
End: 2022-10-11
Payer: COMMERCIAL

## 2022-10-11 DIAGNOSIS — I48.92 ATRIAL FLUTTER, UNSPECIFIED TYPE: Primary | ICD-10-CM

## 2022-10-11 DIAGNOSIS — E11.9 TYPE 2 DIABETES MELLITUS WITHOUT COMPLICATION, UNSPECIFIED WHETHER LONG TERM INSULIN USE: ICD-10-CM

## 2022-10-11 DIAGNOSIS — R53.83 FATIGUE, UNSPECIFIED TYPE: ICD-10-CM

## 2022-10-11 NOTE — TELEPHONE ENCOUNTER
----- Message from Annette Farrell Patient Care Assistant sent at 10/11/2022  3:00 PM CDT -----  Regarding: RE: jonnathan shen 10/17 @ 2:20  Left voicemail  ----- Message -----  From: Gomez Rudolph LPN  Sent: 10/11/2022  11:43 AM CDT  To: Annette Farrell Patient Care Assistant  Subject: jonnathan shen 10/17 @ 2:20                         Are there any outstanding tasks in patient chart? Needs fasting labs    Is there documentation of outstanding tasks in patient chart? no    Has patient been to the ER, urgent care, or another physician since last visit?    Has patient done any blood work or x-rays since last visit?

## 2022-10-12 ENCOUNTER — LAB VISIT (OUTPATIENT)
Dept: LAB | Facility: HOSPITAL | Age: 62
End: 2022-10-12
Attending: INTERNAL MEDICINE
Payer: COMMERCIAL

## 2022-10-12 DIAGNOSIS — I48.92 ATRIAL FLUTTER, UNSPECIFIED TYPE: ICD-10-CM

## 2022-10-12 DIAGNOSIS — R53.83 FATIGUE, UNSPECIFIED TYPE: ICD-10-CM

## 2022-10-12 DIAGNOSIS — E11.9 TYPE 2 DIABETES MELLITUS WITHOUT COMPLICATION, UNSPECIFIED WHETHER LONG TERM INSULIN USE: ICD-10-CM

## 2022-10-12 LAB
ALBUMIN SERPL-MCNC: 4 GM/DL (ref 3.4–4.8)
ALBUMIN/GLOB SERPL: 1.3 RATIO (ref 1.1–2)
ALP SERPL-CCNC: 77 UNIT/L (ref 40–150)
ALT SERPL-CCNC: 49 UNIT/L (ref 0–55)
AST SERPL-CCNC: 39 UNIT/L (ref 5–34)
BILIRUBIN DIRECT+TOT PNL SERPL-MCNC: 1.3 MG/DL
BUN SERPL-MCNC: 13.6 MG/DL (ref 9.8–20.1)
CALCIUM SERPL-MCNC: 9.5 MG/DL (ref 8.4–10.2)
CHLORIDE SERPL-SCNC: 107 MMOL/L (ref 98–107)
CHOLEST SERPL-MCNC: 149 MG/DL
CHOLEST/HDLC SERPL: 3 {RATIO} (ref 0–5)
CO2 SERPL-SCNC: 26 MMOL/L (ref 23–31)
CREAT SERPL-MCNC: 0.63 MG/DL (ref 0.55–1.02)
EST. AVERAGE GLUCOSE BLD GHB EST-MCNC: 131.2 MG/DL
GFR SERPLBLD CREATININE-BSD FMLA CKD-EPI: >60 MLS/MIN/1.73/M2
GLOBULIN SER-MCNC: 3 GM/DL (ref 2.4–3.5)
GLUCOSE SERPL-MCNC: 135 MG/DL (ref 82–115)
HBA1C MFR BLD: 6.2 %
HDLC SERPL-MCNC: 49 MG/DL (ref 35–60)
LDLC SERPL CALC-MCNC: 70 MG/DL (ref 50–140)
POTASSIUM SERPL-SCNC: 4.3 MMOL/L (ref 3.5–5.1)
PROT SERPL-MCNC: 7 GM/DL (ref 5.8–7.6)
SODIUM SERPL-SCNC: 142 MMOL/L (ref 136–145)
TRIGL SERPL-MCNC: 151 MG/DL (ref 37–140)
TSH SERPL-ACNC: 2.04 UIU/ML (ref 0.35–4.94)
VLDLC SERPL CALC-MCNC: 30 MG/DL

## 2022-10-12 PROCEDURE — 84443 ASSAY THYROID STIM HORMONE: CPT

## 2022-10-12 PROCEDURE — 83036 HEMOGLOBIN GLYCOSYLATED A1C: CPT

## 2022-10-12 PROCEDURE — 80061 LIPID PANEL: CPT

## 2022-10-12 PROCEDURE — 36415 COLL VENOUS BLD VENIPUNCTURE: CPT

## 2022-10-12 PROCEDURE — 80053 COMPREHEN METABOLIC PANEL: CPT

## 2022-10-17 ENCOUNTER — OFFICE VISIT (OUTPATIENT)
Dept: INTERNAL MEDICINE | Facility: CLINIC | Age: 62
End: 2022-10-17
Payer: COMMERCIAL

## 2022-10-17 VITALS
SYSTOLIC BLOOD PRESSURE: 136 MMHG | DIASTOLIC BLOOD PRESSURE: 80 MMHG | RESPIRATION RATE: 18 BRPM | BODY MASS INDEX: 42.68 KG/M2 | WEIGHT: 250 LBS | OXYGEN SATURATION: 98 % | HEART RATE: 83 BPM | HEIGHT: 64 IN

## 2022-10-17 DIAGNOSIS — I48.91 ATRIAL FIBRILLATION AND FLUTTER: ICD-10-CM

## 2022-10-17 DIAGNOSIS — I48.92 ATRIAL FIBRILLATION AND FLUTTER: ICD-10-CM

## 2022-10-17 DIAGNOSIS — E11.9 TYPE 2 DIABETES MELLITUS WITHOUT COMPLICATION, WITHOUT LONG-TERM CURRENT USE OF INSULIN: ICD-10-CM

## 2022-10-17 DIAGNOSIS — I10 HYPERTENSION, UNSPECIFIED TYPE: ICD-10-CM

## 2022-10-17 DIAGNOSIS — Z23 NEED FOR VACCINATION: Primary | ICD-10-CM

## 2022-10-17 DIAGNOSIS — E11.9 TYPE 2 DIABETES MELLITUS WITHOUT COMPLICATION, UNSPECIFIED WHETHER LONG TERM INSULIN USE: ICD-10-CM

## 2022-10-17 PROCEDURE — 1160F PR REVIEW ALL MEDS BY PRESCRIBER/CLIN PHARMACIST DOCUMENTED: ICD-10-PCS | Mod: CPTII,,, | Performed by: INTERNAL MEDICINE

## 2022-10-17 PROCEDURE — 3075F SYST BP GE 130 - 139MM HG: CPT | Mod: CPTII,,, | Performed by: INTERNAL MEDICINE

## 2022-10-17 PROCEDURE — 90471 FLU VACCINE (QUAD) GREATER THAN OR EQUAL TO 3YO PRESERVATIVE FREE IM: ICD-10-PCS | Mod: ,,, | Performed by: INTERNAL MEDICINE

## 2022-10-17 PROCEDURE — 1160F RVW MEDS BY RX/DR IN RCRD: CPT | Mod: CPTII,,, | Performed by: INTERNAL MEDICINE

## 2022-10-17 PROCEDURE — 90471 IMMUNIZATION ADMIN: CPT | Mod: ,,, | Performed by: INTERNAL MEDICINE

## 2022-10-17 PROCEDURE — 3075F PR MOST RECENT SYSTOLIC BLOOD PRESS GE 130-139MM HG: ICD-10-PCS | Mod: CPTII,,, | Performed by: INTERNAL MEDICINE

## 2022-10-17 PROCEDURE — 1159F MED LIST DOCD IN RCRD: CPT | Mod: CPTII,,, | Performed by: INTERNAL MEDICINE

## 2022-10-17 PROCEDURE — 1159F PR MEDICATION LIST DOCUMENTED IN MEDICAL RECORD: ICD-10-PCS | Mod: CPTII,,, | Performed by: INTERNAL MEDICINE

## 2022-10-17 PROCEDURE — 99214 OFFICE O/P EST MOD 30 MIN: CPT | Mod: 25,,, | Performed by: INTERNAL MEDICINE

## 2022-10-17 PROCEDURE — 3079F DIAST BP 80-89 MM HG: CPT | Mod: CPTII,,, | Performed by: INTERNAL MEDICINE

## 2022-10-17 PROCEDURE — 99214 PR OFFICE/OUTPT VISIT, EST, LEVL IV, 30-39 MIN: ICD-10-PCS | Mod: 25,,, | Performed by: INTERNAL MEDICINE

## 2022-10-17 PROCEDURE — 90686 IIV4 VACC NO PRSV 0.5 ML IM: CPT | Mod: ,,, | Performed by: INTERNAL MEDICINE

## 2022-10-17 PROCEDURE — 3079F PR MOST RECENT DIASTOLIC BLOOD PRESSURE 80-89 MM HG: ICD-10-PCS | Mod: CPTII,,, | Performed by: INTERNAL MEDICINE

## 2022-10-17 PROCEDURE — 90686 FLU VACCINE (QUAD) GREATER THAN OR EQUAL TO 3YO PRESERVATIVE FREE IM: ICD-10-PCS | Mod: ,,, | Performed by: INTERNAL MEDICINE

## 2022-10-17 RX ORDER — DULAGLUTIDE 1.5 MG/.5ML
INJECTION, SOLUTION SUBCUTANEOUS
COMMUNITY
End: 2022-10-17 | Stop reason: SDUPTHER

## 2022-10-17 RX ORDER — DILTIAZEM HYDROCHLORIDE 240 MG/1
240 CAPSULE, EXTENDED RELEASE ORAL DAILY
COMMUNITY
Start: 2022-10-02 | End: 2023-04-05

## 2022-10-17 RX ORDER — MINERAL OIL
180 ENEMA (ML) RECTAL NIGHTLY
COMMUNITY

## 2022-10-17 RX ORDER — CHOLECALCIFEROL (VITAMIN D3) 25 MCG
1000 TABLET ORAL DAILY
COMMUNITY

## 2022-10-17 RX ORDER — SOTALOL HYDROCHLORIDE 120 MG/1
120 TABLET ORAL 2 TIMES DAILY
COMMUNITY
Start: 2022-10-02 | End: 2023-12-12 | Stop reason: DRUGHIGH

## 2022-10-17 RX ORDER — DULAGLUTIDE 1.5 MG/.5ML
1.5 INJECTION, SOLUTION SUBCUTANEOUS
Qty: 4 PEN | Refills: 5 | Status: SHIPPED | OUTPATIENT
Start: 2022-10-17 | End: 2023-04-03 | Stop reason: SDUPTHER

## 2022-10-17 RX ORDER — FLUTICASONE PROPIONATE 50 MCG
2 SPRAY, SUSPENSION (ML) NASAL NIGHTLY PRN
COMMUNITY

## 2022-10-17 RX ORDER — METOPROLOL SUCCINATE 200 MG/1
200 TABLET, EXTENDED RELEASE ORAL DAILY
COMMUNITY
Start: 2022-07-04 | End: 2022-10-17 | Stop reason: ALTCHOICE

## 2022-10-17 RX ORDER — ASCORBIC ACID 500 MG
500 TABLET ORAL DAILY
COMMUNITY

## 2022-10-17 RX ORDER — NAPROXEN SODIUM 220 MG/1
1 TABLET ORAL 2 TIMES DAILY
COMMUNITY

## 2022-10-17 NOTE — PROGRESS NOTES
Patient ID: Lauren Kaba is a 62 y.o. female.    Chief Complaint: Follow-up (6 month f/u, labs 10/12)      HPI:   Patient presents here today for above reason.     Lauren is a 62-year-old female presents today in follow-up.  History atrial flutter anticoagulated control followed by Cardiology and electrophysiology.  Underwent 2 cardioversions and also 1 ablation and will be going for another ablation near future.  A1c is well controlled with diabetes A1c of 6.2.  Blood pressure is under control risk for age-appropriate screening is up-to-date he is due for colonoscopy but she is anticoagulated and cannot stop anticoagulation at this time.    The patient's Health Maintenance was reviewed and the following appears to be due at this time:   Health Maintenance Due   Topic Date Due    Hepatitis C Screening  Never done    HIV Screening  Never done    TETANUS VACCINE  Never done    Colorectal Cancer Screening  09/27/2021    COVID-19 Vaccine (4 - Booster) 10/25/2021        Past Medical History:  Past Medical History:   Diagnosis Date    Personal history of colonic polyps 09/27/2016     Past Surgical History:   Procedure Laterality Date    BREAST SURGERY  11/2013    Reduction    CARDIAC ELECTROPHYSIOLOGY STUDY AND ABLATION      CARDIOVERSION      x 3    COLONOSCOPY W/ POLYPECTOMY  09/27/2016    JOINT REPLACEMENT  5/2013    TONSILLECTOMY  1989     Review of patient's allergies indicates:   Allergen Reactions    Cipro [ciprofloxacin hcl] Hives     Current Outpatient Medications on File Prior to Visit   Medication Sig Dispense Refill    apixaban (ELIQUIS) 5 mg Tab Take 5 mg by mouth 2 (two) times daily.      ascorbic acid, vitamin C, (VITAMIN C) 500 MG tablet Take 500 mg by mouth.      celecoxib (CELEBREX) 200 MG capsule TAKE ONE CAPSULE BY MOUTH EVERY DAY 30 capsule 5    diltiaZEM (TIAZAC) 240 MG Cs24 Take 240 mg by mouth once daily.      fexofenadine (ALLEGRA) 180 MG tablet Take 180 mg by mouth.      fluticasone  "propionate (FLONASE) 50 mcg/actuation nasal spray 1 spray by Nasal route.      glucosamine HCl (GLUCOSAMINE, BULK, MISC)       metFORMIN (GLUCOPHAGE-XR) 500 MG ER 24hr tablet TAKE ONE TABLET BY MOUTH TWICE A DAY 60 tablet 1    MULTIVITAMIN ORAL Take 1 tablet by mouth once daily.      omega 3-dha-epa-fish oil 1,200 (144-216) mg Cap Take 1 capsule by mouth once daily.      sotaloL (BETAPACE) 120 MG Tab Take 120 mg by mouth 2 (two) times daily.      vitamin D (VITAMIN D3) 1000 units Tab Take 1,000 Units by mouth.      [DISCONTINUED] ACCU-CHEK GUIDE TEST STRIPS Strp USE TO TEST SUGARS TWO TIMES A  strip 11    [DISCONTINUED] dulaglutide (TRULICITY) 1.5 mg/0.5 mL pen injector Inject into the skin every 7 days.      metoprolol succinate (TOPROL-XL) 200 MG 24 hr tablet Take 200 mg by mouth once daily.       No current facility-administered medications on file prior to visit.     Social History     Socioeconomic History    Marital status:    Tobacco Use    Smoking status: Never    Smokeless tobacco: Never   Substance and Sexual Activity    Alcohol use: Never    Drug use: Never    Sexual activity: Not Currently     Family History   Problem Relation Age of Onset    Cancer Mother     Diabetes Mother     Arthritis Father     Heart disease Father        ROS:   Comprehensive review of systems was performed and is negative except as noted above    Vitals/PE:   /80 (BP Location: Left arm, Patient Position: Sitting)   Pulse 83   Resp 18   Ht 5' 4" (1.626 m)   Wt 113.4 kg (250 lb)   SpO2 98%   BMI 42.91 kg/m²   Physical Exam    General: Alert and oriented, No acute distress.   Eye: Normal conjunctiva without exudate.  HENMT: Normocephalic/AT, Normal hearing, Oral mucosa is moist and pink   Neck: No goiter visualized.   Respiratory: Lungs CTAB, Respirations are non-labored, Breath sounds are equal, Symmetrical chest wall expansion.  Cardiovascular: Normal rate, Regular rhythm, No murmur, No edema. "   Gastrointestinal: Non-distended.   Genitourinary: Deferred.  Musculoskeletal: Normal ROM, Normal gait, No deformities or amputations.  Integumentary: Warm, Dry, Intact. No diaphoresis, or flushing.  Neurologic: No focal deficits, Cranial Nerves II-XII are grossly intact.   Psychiatric: Cooperative, Appropriate mood & affect, Normal judgment, Non-suicidal.    Assessment/Plan:       1. Need for vaccination  -     Influenza - Quadrivalent (PF)    2. Type 2 diabetes mellitus without complication, without long-term current use of insulin    3. Hypertension, unspecified type    4. Atrial fibrillation and flutter         Plan:  DM well controlled A1c of 6.2 continue current therapy   Eye exam done in July.    continue follow-up with Cardiology and electrophysiology.    Overall doing well screening is up-to-date continue with current therapy.    Education and counseling done face to face regarding medical conditions and plan. Contact office if new symptoms develop. Should any symptoms ever significantly worsen seek emergency medical attention/go to ER. Follow up at least yearly for wellness or sooner PRN. Nurse to call patient with any results. The patient is receptive, expresses understanding and is agreeable to plan. All questions have been answered.    No follow-ups on file.

## 2022-12-29 ENCOUNTER — DOCUMENTATION ONLY (OUTPATIENT)
Dept: INTERNAL MEDICINE | Facility: CLINIC | Age: 62
End: 2022-12-29
Payer: COMMERCIAL

## 2023-03-13 LAB — BCS RECOMMENDATION EXT: NORMAL

## 2023-03-29 ENCOUNTER — TELEPHONE (OUTPATIENT)
Dept: INTERNAL MEDICINE | Facility: CLINIC | Age: 63
End: 2023-03-29
Payer: COMMERCIAL

## 2023-03-29 NOTE — TELEPHONE ENCOUNTER
----- Message from Ruba Landaverde LPN sent at 3/29/2023  1:58 PM CDT -----  Regarding: Linda ROSEN 4/5/23 @9:40  Patient will need fasting labs

## 2023-04-03 ENCOUNTER — LAB VISIT (OUTPATIENT)
Dept: LAB | Facility: HOSPITAL | Age: 63
End: 2023-04-03
Attending: INTERNAL MEDICINE
Payer: COMMERCIAL

## 2023-04-03 DIAGNOSIS — I48.92 ATRIAL FIBRILLATION AND FLUTTER: ICD-10-CM

## 2023-04-03 DIAGNOSIS — I48.91 ATRIAL FIBRILLATION AND FLUTTER: ICD-10-CM

## 2023-04-03 DIAGNOSIS — I10 HYPERTENSION, UNSPECIFIED TYPE: ICD-10-CM

## 2023-04-03 DIAGNOSIS — E11.9 TYPE 2 DIABETES MELLITUS WITHOUT COMPLICATION, WITHOUT LONG-TERM CURRENT USE OF INSULIN: ICD-10-CM

## 2023-04-03 DIAGNOSIS — Z23 NEED FOR VACCINATION: ICD-10-CM

## 2023-04-03 DIAGNOSIS — E11.9 TYPE 2 DIABETES MELLITUS WITHOUT COMPLICATION, UNSPECIFIED WHETHER LONG TERM INSULIN USE: ICD-10-CM

## 2023-04-03 LAB
ALBUMIN SERPL-MCNC: 4 G/DL (ref 3.4–4.8)
ALBUMIN/GLOB SERPL: 1.4 RATIO (ref 1.1–2)
ALP SERPL-CCNC: 101 UNIT/L (ref 40–150)
ALT SERPL-CCNC: 61 UNIT/L (ref 0–55)
AST SERPL-CCNC: 38 UNIT/L (ref 5–34)
BILIRUBIN DIRECT+TOT PNL SERPL-MCNC: 1 MG/DL
BUN SERPL-MCNC: 16.4 MG/DL (ref 9.8–20.1)
CALCIUM SERPL-MCNC: 9.5 MG/DL (ref 8.4–10.2)
CHLORIDE SERPL-SCNC: 106 MMOL/L (ref 98–107)
CHOLEST SERPL-MCNC: 157 MG/DL
CHOLEST/HDLC SERPL: 3 {RATIO} (ref 0–5)
CO2 SERPL-SCNC: 24 MMOL/L (ref 23–31)
CREAT SERPL-MCNC: 0.65 MG/DL (ref 0.55–1.02)
EST. AVERAGE GLUCOSE BLD GHB EST-MCNC: 131.2 MG/DL
GFR SERPLBLD CREATININE-BSD FMLA CKD-EPI: >60 MLS/MIN/1.73/M2
GLOBULIN SER-MCNC: 2.8 GM/DL (ref 2.4–3.5)
GLUCOSE SERPL-MCNC: 148 MG/DL (ref 82–115)
HBA1C MFR BLD: 6.2 %
HDLC SERPL-MCNC: 47 MG/DL (ref 35–60)
LDLC SERPL CALC-MCNC: 79 MG/DL (ref 50–140)
POTASSIUM SERPL-SCNC: 4.3 MMOL/L (ref 3.5–5.1)
PROT SERPL-MCNC: 6.8 GM/DL (ref 5.8–7.6)
SODIUM SERPL-SCNC: 142 MMOL/L (ref 136–145)
T4 FREE SERPL-MCNC: 1.06 NG/DL (ref 0.7–1.48)
TRIGL SERPL-MCNC: 155 MG/DL (ref 37–140)
TSH SERPL-ACNC: 2.06 UIU/ML (ref 0.35–4.94)
VLDLC SERPL CALC-MCNC: 31 MG/DL

## 2023-04-03 PROCEDURE — 80053 COMPREHEN METABOLIC PANEL: CPT

## 2023-04-03 PROCEDURE — 83036 HEMOGLOBIN GLYCOSYLATED A1C: CPT

## 2023-04-03 PROCEDURE — 80061 LIPID PANEL: CPT

## 2023-04-03 PROCEDURE — 84439 ASSAY OF FREE THYROXINE: CPT

## 2023-04-03 PROCEDURE — 36415 COLL VENOUS BLD VENIPUNCTURE: CPT

## 2023-04-03 PROCEDURE — 84443 ASSAY THYROID STIM HORMONE: CPT

## 2023-04-03 RX ORDER — DULAGLUTIDE 1.5 MG/.5ML
1.5 INJECTION, SOLUTION SUBCUTANEOUS
Qty: 4 PEN | Refills: 5 | Status: SHIPPED | OUTPATIENT
Start: 2023-04-03 | End: 2023-09-05 | Stop reason: SDUPTHER

## 2023-04-05 ENCOUNTER — OFFICE VISIT (OUTPATIENT)
Dept: INTERNAL MEDICINE | Facility: CLINIC | Age: 63
End: 2023-04-05
Payer: COMMERCIAL

## 2023-04-05 VITALS
OXYGEN SATURATION: 97 % | WEIGHT: 246 LBS | DIASTOLIC BLOOD PRESSURE: 76 MMHG | HEIGHT: 64 IN | SYSTOLIC BLOOD PRESSURE: 118 MMHG | BODY MASS INDEX: 42 KG/M2 | HEART RATE: 75 BPM | RESPIRATION RATE: 18 BRPM

## 2023-04-05 DIAGNOSIS — E66.01 MORBID (SEVERE) OBESITY DUE TO EXCESS CALORIES: ICD-10-CM

## 2023-04-05 DIAGNOSIS — E11.9 TYPE 2 DIABETES MELLITUS WITHOUT COMPLICATION, WITHOUT LONG-TERM CURRENT USE OF INSULIN: Primary | ICD-10-CM

## 2023-04-05 DIAGNOSIS — I48.20 CHRONIC A-FIB: ICD-10-CM

## 2023-04-05 PROCEDURE — 3288F PR FALLS RISK ASSESSMENT DOCUMENTED: ICD-10-PCS | Mod: CPTII,,, | Performed by: INTERNAL MEDICINE

## 2023-04-05 PROCEDURE — 3074F SYST BP LT 130 MM HG: CPT | Mod: CPTII,,, | Performed by: INTERNAL MEDICINE

## 2023-04-05 PROCEDURE — 1159F MED LIST DOCD IN RCRD: CPT | Mod: CPTII,,, | Performed by: INTERNAL MEDICINE

## 2023-04-05 PROCEDURE — 3074F PR MOST RECENT SYSTOLIC BLOOD PRESSURE < 130 MM HG: ICD-10-PCS | Mod: CPTII,,, | Performed by: INTERNAL MEDICINE

## 2023-04-05 PROCEDURE — 3008F PR BODY MASS INDEX (BMI) DOCUMENTED: ICD-10-PCS | Mod: CPTII,,, | Performed by: INTERNAL MEDICINE

## 2023-04-05 PROCEDURE — 3008F BODY MASS INDEX DOCD: CPT | Mod: CPTII,,, | Performed by: INTERNAL MEDICINE

## 2023-04-05 PROCEDURE — 99214 OFFICE O/P EST MOD 30 MIN: CPT | Mod: ,,, | Performed by: INTERNAL MEDICINE

## 2023-04-05 PROCEDURE — 1160F RVW MEDS BY RX/DR IN RCRD: CPT | Mod: CPTII,,, | Performed by: INTERNAL MEDICINE

## 2023-04-05 PROCEDURE — 1159F PR MEDICATION LIST DOCUMENTED IN MEDICAL RECORD: ICD-10-PCS | Mod: CPTII,,, | Performed by: INTERNAL MEDICINE

## 2023-04-05 PROCEDURE — 1101F PR PT FALLS ASSESS DOC 0-1 FALLS W/OUT INJ PAST YR: ICD-10-PCS | Mod: CPTII,,, | Performed by: INTERNAL MEDICINE

## 2023-04-05 PROCEDURE — 3078F DIAST BP <80 MM HG: CPT | Mod: CPTII,,, | Performed by: INTERNAL MEDICINE

## 2023-04-05 PROCEDURE — 3288F FALL RISK ASSESSMENT DOCD: CPT | Mod: CPTII,,, | Performed by: INTERNAL MEDICINE

## 2023-04-05 PROCEDURE — 1101F PT FALLS ASSESS-DOCD LE1/YR: CPT | Mod: CPTII,,, | Performed by: INTERNAL MEDICINE

## 2023-04-05 PROCEDURE — 3078F PR MOST RECENT DIASTOLIC BLOOD PRESSURE < 80 MM HG: ICD-10-PCS | Mod: CPTII,,, | Performed by: INTERNAL MEDICINE

## 2023-04-05 PROCEDURE — 1160F PR REVIEW ALL MEDS BY PRESCRIBER/CLIN PHARMACIST DOCUMENTED: ICD-10-PCS | Mod: CPTII,,, | Performed by: INTERNAL MEDICINE

## 2023-04-05 PROCEDURE — 99214 PR OFFICE/OUTPT VISIT, EST, LEVL IV, 30-39 MIN: ICD-10-PCS | Mod: ,,, | Performed by: INTERNAL MEDICINE

## 2023-04-05 NOTE — PROGRESS NOTES
Patient ID: Lauren Kaba is a 63 y.o. female.    Chief Complaint: Follow-up (6 month f/u, labs 4/3)      HPI:   Patient presents here today for above reason.     Lauren is a 63-year-old female presents today in follow-up.  History atrial flutter anticoagulated control followed by Cardiology and electrophysiology.  Underwent 2 cardioversions and also 1 ablation and will be going for another ablation near future.  A1c is well controlled with diabetes A1c of 6.2.  Blood pressure is under control risk for age-appropriate screening is up-to-date he is due for colonoscopy but she is anticoagulated and cannot stop anticoagulation at this time.    The patient's Health Maintenance was reviewed and the following appears to be due at this time:   Health Maintenance Due   Topic Date Due    Hepatitis C Screening  Never done    HIV Screening  Never done    TETANUS VACCINE  Never done    DEXA Scan  10/18/2020    Colorectal Cancer Screening  09/27/2021    COVID-19 Vaccine (4 - Booster) 10/25/2021    Pneumococcal Vaccines (Age 65+) (1 - PCV) Never done    Mammogram  03/10/2023        Past Medical History:  Past Medical History:   Diagnosis Date    Personal history of colonic polyps 09/27/2016     Past Surgical History:   Procedure Laterality Date    BREAST SURGERY  11/2013    Reduction    CARDIAC ELECTROPHYSIOLOGY STUDY AND ABLATION      CARDIOVERSION      x 3    COLONOSCOPY W/ POLYPECTOMY  09/27/2016    JOINT REPLACEMENT  5/2013    TONSILLECTOMY  1989     Review of patient's allergies indicates:   Allergen Reactions    Cipro [ciprofloxacin hcl] Hives     Current Outpatient Medications on File Prior to Visit   Medication Sig Dispense Refill    apixaban (ELIQUIS) 5 mg Tab Take 5 mg by mouth 2 (two) times daily.      ascorbic acid, vitamin C, (VITAMIN C) 500 MG tablet Take 500 mg by mouth.      celecoxib (CELEBREX) 200 MG capsule TAKE ONE CAPSULE BY MOUTH EVERY DAY 30 capsule 5    dulaglutide (TRULICITY) 1.5 mg/0.5 mL pen  "injector Inject 1.5 mg into the skin every 7 days. 4 pen 5    fexofenadine (ALLEGRA) 180 MG tablet Take 180 mg by mouth.      fluticasone propionate (FLONASE) 50 mcg/actuation nasal spray 1 spray by Nasal route.      glucosamine HCl (GLUCOSAMINE, BULK, MISC)       metFORMIN (GLUCOPHAGE-XR) 500 MG ER 24hr tablet TAKE ONE TABLET BY MOUTH TWICE A DAY 60 tablet 6    MULTIVITAMIN ORAL Take 1 tablet by mouth once daily.      omega 3-dha-epa-fish oil 1,200 (144-216) mg Cap Take 1 capsule by mouth once daily.      sotaloL (BETAPACE) 120 MG Tab Take 120 mg by mouth 2 (two) times daily.      vitamin D (VITAMIN D3) 1000 units Tab Take 1,000 Units by mouth.      blood sugar diagnostic (ACCU-CHEK GUIDE TEST STRIPS) Strp USE TO TEST SUGARS FOUR TIMES A  strip 11    [DISCONTINUED] diltiaZEM (TIAZAC) 240 MG Cs24 Take 240 mg by mouth once daily.       No current facility-administered medications on file prior to visit.     Social History     Socioeconomic History    Marital status:    Tobacco Use    Smoking status: Never    Smokeless tobacco: Never   Substance and Sexual Activity    Alcohol use: Never    Drug use: Never    Sexual activity: Not Currently     Family History   Problem Relation Age of Onset    Cancer Mother     Diabetes Mother     Arthritis Father     Heart disease Father        ROS:   Comprehensive review of systems was performed and is negative except as noted above    Vitals/PE:   /76 (BP Location: Left arm, Patient Position: Sitting)   Pulse 75   Resp 18   Ht 5' 4" (1.626 m)   Wt 111.6 kg (246 lb)   SpO2 97%   BMI 42.23 kg/m²   Physical Exam    General: Alert and oriented, No acute distress. Bmi 42  Eye: Normal conjunctiva without exudate.  HENMT: Normocephalic/AT, Normal hearing, Oral mucosa is moist and pink   Neck: No goiter visualized.   Respiratory: Lungs CTAB, Respirations are non-labored, Breath sounds are equal, Symmetrical chest wall expansion.  Cardiovascular: Normal rate, Regular " rhythm, No murmur, No edema.   Gastrointestinal: Non-distended.   Genitourinary: Deferred.  Musculoskeletal: Normal ROM, Normal gait, No deformities or amputations.  Integumentary: Warm, Dry, Intact. No diaphoresis, or flushing.  Neurologic: No focal deficits, Cranial Nerves II-XII are grossly intact.   Psychiatric: Cooperative, Appropriate mood & affect, Normal judgment, Non-suicidal.    Assessment/Plan:       1. Type 2 diabetes mellitus without complication, without long-term current use of insulin   Well controlled, cont current rx  2. Chronic a-fib   Rate controlled, on oac, sees cardio  3. Morbid (severe) obesity due to excess calories   Diet exercise wtloss, poss change to mounjaro     4. Fabian - compliant with pap  Rtc 6mo w labs    Education and counseling done face to face regarding medical conditions and plan. Contact office if new symptoms develop. Should any symptoms ever significantly worsen seek emergency medical attention/go to ER. Follow up at least yearly for wellness or sooner PRN. Nurse to call patient with any results. The patient is receptive, expresses understanding and is agreeable to plan. All questions have been answered.    No follow-ups on file.

## 2023-04-05 NOTE — LETTER
AUTHORIZATION FOR RELEASE OF   CONFIDENTIAL INFORMATION    Dear Staff,    We are seeing Lauren Kaba, date of birth 1960, in the clinic at [place] [address] [facility]. TRIXIE OSUNA MD is the patient's PCP. Lauren Kaba has an outstanding lab/procedure at the time we reviewed her chart. In order to help keep her health information updated, she has authorized us to request the following medical record(s):        ( x )  MAMMOGRAM                                      (  )  COLONOSCOPY      (  )  PAP SMEAR                                          (  )  OUTSIDE LAB RESULTS     (  )  DEXA SCAN                                          (  )  EYE EXAM            (  )  FOOT EXAM                                          (  )  ENTIRE RECORD     (  )  OUTSIDE IMMUNIZATIONS                 (  )  _______________         Please fax records to [facility], BRADLEY CHASTANT, MD, [phone]     If you have any questions, please contact Gomez at [phone].           Patient Name: Lauren Kaba  : 1960  Patient Phone #: [phone]

## 2023-04-05 NOTE — LETTER
AUTHORIZATION FOR RELEASE OF   CONFIDENTIAL INFORMATION    Dear Staff,    We are seeing Lauren Kaba, date of birth 1960, in the clinic at [place] [address] [facility]. TRIXIE OSUNA MD is the patient's PCP. Lauren Kaba has an outstanding lab/procedure at the time we reviewed her chart. In order to help keep her health information updated, she has authorized us to request the following medical record(s):        (  )  MAMMOGRAM                                      (  )  COLONOSCOPY      (  )  PAP SMEAR                                          (  )  OUTSIDE LAB RESULTS     ( x )  DEXA SCAN                                          (  )  EYE EXAM            (  )  FOOT EXAM                                          (  )  ENTIRE RECORD     (  )  OUTSIDE IMMUNIZATIONS                 (  )  _______________         Please fax records to [facility], BRADLEY CHASTANT, MD, [phone]     If you have any questions, please contact Gomez at [phone].           Patient Name: Lauren Kaba  : 1960  Patient Phone #: [phone]

## 2023-04-10 ENCOUNTER — TELEPHONE (OUTPATIENT)
Dept: INTERNAL MEDICINE | Facility: CLINIC | Age: 63
End: 2023-04-10
Payer: COMMERCIAL

## 2023-04-10 ENCOUNTER — DOCUMENTATION ONLY (OUTPATIENT)
Dept: INTERNAL MEDICINE | Facility: CLINIC | Age: 63
End: 2023-04-10
Payer: COMMERCIAL

## 2023-04-10 NOTE — TELEPHONE ENCOUNTER
----- Message from Kati Adams sent at 4/10/2023  9:07 AM CDT -----  Regarding: Needs Medical Advice  Type:  Needs Medical Advice    Who Called: Annette meier/ Dr. Urias's office      Would the patient rather a call back or a response via [facility]?   Best Call Back Number: [phone]  Additional Information: Annette called regarding pt to speak with one of the staff members. Called b/l 2x's & no ans. Please call Annette back regarding pt.

## 2023-05-23 DIAGNOSIS — E11.9 TYPE 2 DIABETES MELLITUS WITHOUT COMPLICATION, WITHOUT LONG-TERM CURRENT USE OF INSULIN: ICD-10-CM

## 2023-05-23 RX ORDER — METFORMIN HYDROCHLORIDE 500 MG/1
500 TABLET, EXTENDED RELEASE ORAL 2 TIMES DAILY
Qty: 180 TABLET | Refills: 3 | Status: SHIPPED | OUTPATIENT
Start: 2023-05-23 | End: 2023-05-31

## 2023-05-23 RX ORDER — METFORMIN HYDROCHLORIDE 500 MG/1
TABLET, EXTENDED RELEASE ORAL
Qty: 60 TABLET | Refills: 6 | Status: SHIPPED | OUTPATIENT
Start: 2023-05-23 | End: 2023-05-23 | Stop reason: SDUPTHER

## 2023-05-31 DIAGNOSIS — E11.9 TYPE 2 DIABETES MELLITUS WITHOUT COMPLICATION, WITHOUT LONG-TERM CURRENT USE OF INSULIN: ICD-10-CM

## 2023-05-31 RX ORDER — METFORMIN HYDROCHLORIDE 500 MG/1
TABLET, EXTENDED RELEASE ORAL
Qty: 60 TABLET | Refills: 6 | Status: SHIPPED | OUTPATIENT
Start: 2023-05-31 | End: 2024-01-24

## 2023-07-04 DIAGNOSIS — M77.9 INFLAMMATION AROUND JOINT: ICD-10-CM

## 2023-07-05 RX ORDER — CELECOXIB 200 MG/1
CAPSULE ORAL
Qty: 30 CAPSULE | Refills: 5 | Status: SHIPPED | OUTPATIENT
Start: 2023-07-05 | End: 2023-12-26

## 2023-08-23 ENCOUNTER — HOSPITAL ENCOUNTER (INPATIENT)
Facility: HOSPITAL | Age: 63
LOS: 2 days | Discharge: HOME | DRG: 501 | End: 2023-08-25
Attending: EMERGENCY MEDICINE | Admitting: SURGERY
Payer: OTHER MISCELLANEOUS

## 2023-08-23 DIAGNOSIS — S52.602B TYPE I OR II OPEN FRACTURE OF DISTAL END OF BOTH RADIUS AND ULNA OF LEFT UPPER EXTREMITY, INITIAL ENCOUNTER: ICD-10-CM

## 2023-08-23 DIAGNOSIS — Z79.01 ANTICOAGULATED BY ANTICOAGULATION TREATMENT: ICD-10-CM

## 2023-08-23 DIAGNOSIS — I48.92 ATRIAL FLUTTER, UNSPECIFIED TYPE: Primary | ICD-10-CM

## 2023-08-23 DIAGNOSIS — S52.502B TYPE I OR II OPEN FRACTURE OF DISTAL END OF BOTH RADIUS AND ULNA OF LEFT UPPER EXTREMITY, INITIAL ENCOUNTER: ICD-10-CM

## 2023-08-23 DIAGNOSIS — Z01.818 PRE-OP EVALUATION: ICD-10-CM

## 2023-08-23 DIAGNOSIS — S52.502B TYPE I OR II OPEN FRACTURE OF DISTAL END OF LEFT RADIUS, UNSPECIFIED FRACTURE MORPHOLOGY, INITIAL ENCOUNTER: ICD-10-CM

## 2023-08-23 DIAGNOSIS — S01.531A PUNCTURE WOUND WITHOUT FOREIGN BODY OF LIP, INITIAL ENCOUNTER: ICD-10-CM

## 2023-08-23 DIAGNOSIS — I48.91 ATRIAL FIBRILLATION WITH RVR: ICD-10-CM

## 2023-08-23 DIAGNOSIS — S09.90XA CLOSED HEAD INJURY, INITIAL ENCOUNTER: ICD-10-CM

## 2023-08-23 DIAGNOSIS — T14.90XA TRAUMA: ICD-10-CM

## 2023-08-23 LAB
ALBUMIN SERPL-MCNC: 3.9 G/DL (ref 3.4–4.8)
ALBUMIN/GLOB SERPL: 1.5 RATIO (ref 1.1–2)
ALP SERPL-CCNC: 96 UNIT/L (ref 40–150)
ALT SERPL-CCNC: 64 UNIT/L (ref 0–55)
APTT PPP: 20.5 SECONDS (ref 23.2–33.7)
AST SERPL-CCNC: 52 UNIT/L (ref 5–34)
BASOPHILS # BLD AUTO: 0.05 X10(3)/MCL
BASOPHILS NFR BLD AUTO: 0.5 %
BILIRUB SERPL-MCNC: 1 MG/DL
BUN SERPL-MCNC: 21.1 MG/DL (ref 9.8–20.1)
CALCIUM SERPL-MCNC: 9.6 MG/DL (ref 8.4–10.2)
CHLORIDE SERPL-SCNC: 105 MMOL/L (ref 98–107)
CO2 SERPL-SCNC: 24 MMOL/L (ref 23–31)
CREAT SERPL-MCNC: 0.69 MG/DL (ref 0.55–1.02)
EOSINOPHIL # BLD AUTO: 0.25 X10(3)/MCL (ref 0–0.9)
EOSINOPHIL NFR BLD AUTO: 2.5 %
ERYTHROCYTE [DISTWIDTH] IN BLOOD BY AUTOMATED COUNT: 13.4 % (ref 11.5–17)
GFR SERPLBLD CREATININE-BSD FMLA CKD-EPI: >60 MLS/MIN/1.73/M2
GLOBULIN SER-MCNC: 2.6 GM/DL (ref 2.4–3.5)
GLUCOSE SERPL-MCNC: 156 MG/DL (ref 82–115)
HCT VFR BLD AUTO: 38.1 % (ref 37–47)
HGB BLD-MCNC: 13 G/DL (ref 12–16)
IMM GRANULOCYTES # BLD AUTO: 0.03 X10(3)/MCL (ref 0–0.04)
IMM GRANULOCYTES NFR BLD AUTO: 0.3 %
INR PPP: 1
LYMPHOCYTES # BLD AUTO: 2.1 X10(3)/MCL (ref 0.6–4.6)
LYMPHOCYTES NFR BLD AUTO: 21.2 %
MCH RBC QN AUTO: 29.2 PG (ref 27–31)
MCHC RBC AUTO-ENTMCNC: 34.1 G/DL (ref 33–36)
MCV RBC AUTO: 85.6 FL (ref 80–94)
MONOCYTES # BLD AUTO: 0.85 X10(3)/MCL (ref 0.1–1.3)
MONOCYTES NFR BLD AUTO: 8.6 %
NEUTROPHILS # BLD AUTO: 6.61 X10(3)/MCL (ref 2.1–9.2)
NEUTROPHILS NFR BLD AUTO: 66.9 %
NRBC BLD AUTO-RTO: 0 %
PLATELET # BLD AUTO: 168 X10(3)/MCL (ref 130–400)
PMV BLD AUTO: 9.1 FL (ref 7.4–10.4)
POTASSIUM SERPL-SCNC: 3.5 MMOL/L (ref 3.5–5.1)
PROT SERPL-MCNC: 6.5 GM/DL (ref 5.8–7.6)
PROTHROMBIN TIME: 13.1 SECONDS (ref 12.5–14.5)
RBC # BLD AUTO: 4.45 X10(6)/MCL (ref 4.2–5.4)
SODIUM SERPL-SCNC: 141 MMOL/L (ref 136–145)
WBC # SPEC AUTO: 9.89 X10(3)/MCL (ref 4.5–11.5)

## 2023-08-23 PROCEDURE — G0390 TRAUMA RESPONS W/HOSP CRITI: HCPCS

## 2023-08-23 PROCEDURE — 63600175 PHARM REV CODE 636 W HCPCS: Performed by: EMERGENCY MEDICINE

## 2023-08-23 PROCEDURE — 85610 PROTHROMBIN TIME: CPT | Performed by: EMERGENCY MEDICINE

## 2023-08-23 PROCEDURE — 11000001 HC ACUTE MED/SURG PRIVATE ROOM

## 2023-08-23 PROCEDURE — 25000003 PHARM REV CODE 250: Performed by: NURSE PRACTITIONER

## 2023-08-23 PROCEDURE — 90715 TDAP VACCINE 7 YRS/> IM: CPT | Performed by: EMERGENCY MEDICINE

## 2023-08-23 PROCEDURE — 85730 THROMBOPLASTIN TIME PARTIAL: CPT | Performed by: EMERGENCY MEDICINE

## 2023-08-23 PROCEDURE — 63600175 PHARM REV CODE 636 W HCPCS: Performed by: NURSE PRACTITIONER

## 2023-08-23 PROCEDURE — 93005 ELECTROCARDIOGRAM TRACING: CPT

## 2023-08-23 PROCEDURE — 99291 CRITICAL CARE FIRST HOUR: CPT

## 2023-08-23 PROCEDURE — 90471 IMMUNIZATION ADMIN: CPT | Performed by: EMERGENCY MEDICINE

## 2023-08-23 PROCEDURE — 93010 EKG 12-LEAD: ICD-10-PCS | Mod: ,,, | Performed by: INTERNAL MEDICINE

## 2023-08-23 PROCEDURE — 85025 COMPLETE CBC W/AUTO DIFF WBC: CPT | Performed by: EMERGENCY MEDICINE

## 2023-08-23 PROCEDURE — 25000003 PHARM REV CODE 250

## 2023-08-23 PROCEDURE — 25675 CLTX DSTL RAD/ULN DISLC MNPJ: CPT

## 2023-08-23 PROCEDURE — 93010 ELECTROCARDIOGRAM REPORT: CPT | Mod: ,,, | Performed by: INTERNAL MEDICINE

## 2023-08-23 PROCEDURE — 80053 COMPREHEN METABOLIC PANEL: CPT | Performed by: EMERGENCY MEDICINE

## 2023-08-23 RX ORDER — CEFAZOLIN SODIUM 2 G/50ML
SOLUTION INTRAVENOUS
Status: COMPLETED | OUTPATIENT
Start: 2023-08-23 | End: 2023-08-23

## 2023-08-23 RX ORDER — OXYCODONE HYDROCHLORIDE 10 MG/1
10 TABLET ORAL EVERY 4 HOURS PRN
Status: DISCONTINUED | OUTPATIENT
Start: 2023-08-23 | End: 2023-08-25 | Stop reason: HOSPADM

## 2023-08-23 RX ORDER — GABAPENTIN 300 MG/1
300 CAPSULE ORAL 3 TIMES DAILY
Status: DISCONTINUED | OUTPATIENT
Start: 2023-08-23 | End: 2023-08-25 | Stop reason: HOSPADM

## 2023-08-23 RX ORDER — TALC
6 POWDER (GRAM) TOPICAL NIGHTLY PRN
Status: DISCONTINUED | OUTPATIENT
Start: 2023-08-23 | End: 2023-08-25 | Stop reason: HOSPADM

## 2023-08-23 RX ORDER — FENTANYL CITRATE 50 UG/ML
INJECTION, SOLUTION INTRAMUSCULAR; INTRAVENOUS
Status: DISPENSED
Start: 2023-08-23 | End: 2023-08-24

## 2023-08-23 RX ORDER — PROPOFOL 10 MG/ML
VIAL (ML) INTRAVENOUS
Status: DISPENSED
Start: 2023-08-23 | End: 2023-08-24

## 2023-08-23 RX ORDER — CEFAZOLIN SODIUM 1 G/3ML
INJECTION, POWDER, FOR SOLUTION INTRAMUSCULAR; INTRAVENOUS
Status: DISPENSED
Start: 2023-08-23 | End: 2023-08-24

## 2023-08-23 RX ORDER — METOPROLOL TARTRATE 1 MG/ML
INJECTION, SOLUTION INTRAVENOUS
Status: COMPLETED
Start: 2023-08-23 | End: 2023-08-23

## 2023-08-23 RX ORDER — METOPROLOL TARTRATE 1 MG/ML
5 INJECTION, SOLUTION INTRAVENOUS EVERY 5 MIN PRN
Status: DISCONTINUED | OUTPATIENT
Start: 2023-08-23 | End: 2023-08-25 | Stop reason: HOSPADM

## 2023-08-23 RX ORDER — SODIUM CHLORIDE, SODIUM LACTATE, POTASSIUM CHLORIDE, CALCIUM CHLORIDE 600; 310; 30; 20 MG/100ML; MG/100ML; MG/100ML; MG/100ML
INJECTION, SOLUTION INTRAVENOUS CONTINUOUS
Status: DISCONTINUED | OUTPATIENT
Start: 2023-08-23 | End: 2023-08-25 | Stop reason: HOSPADM

## 2023-08-23 RX ORDER — IBUPROFEN 200 MG
16 TABLET ORAL
Status: DISCONTINUED | OUTPATIENT
Start: 2023-08-23 | End: 2023-08-25 | Stop reason: HOSPADM

## 2023-08-23 RX ORDER — OXYCODONE HYDROCHLORIDE 5 MG/1
5 TABLET ORAL EVERY 4 HOURS PRN
Status: DISCONTINUED | OUTPATIENT
Start: 2023-08-23 | End: 2023-08-25 | Stop reason: HOSPADM

## 2023-08-23 RX ORDER — ENOXAPARIN SODIUM 100 MG/ML
40 INJECTION SUBCUTANEOUS EVERY 12 HOURS
Status: DISCONTINUED | OUTPATIENT
Start: 2023-08-23 | End: 2023-08-25 | Stop reason: HOSPADM

## 2023-08-23 RX ORDER — DOCUSATE SODIUM 100 MG/1
100 CAPSULE, LIQUID FILLED ORAL 2 TIMES DAILY
Status: DISCONTINUED | OUTPATIENT
Start: 2023-08-23 | End: 2023-08-25 | Stop reason: HOSPADM

## 2023-08-23 RX ORDER — GLUCAGON 1 MG
1 KIT INJECTION
Status: DISCONTINUED | OUTPATIENT
Start: 2023-08-23 | End: 2023-08-25 | Stop reason: HOSPADM

## 2023-08-23 RX ORDER — ONDANSETRON 2 MG/ML
INJECTION INTRAMUSCULAR; INTRAVENOUS CODE/TRAUMA/SEDATION MEDICATION
Status: COMPLETED | OUTPATIENT
Start: 2023-08-23 | End: 2023-08-23

## 2023-08-23 RX ORDER — INSULIN ASPART 100 [IU]/ML
0-5 INJECTION, SOLUTION INTRAVENOUS; SUBCUTANEOUS
Status: DISCONTINUED | OUTPATIENT
Start: 2023-08-23 | End: 2023-08-25 | Stop reason: HOSPADM

## 2023-08-23 RX ORDER — POLYETHYLENE GLYCOL 3350 17 G/17G
17 POWDER, FOR SOLUTION ORAL 2 TIMES DAILY
Status: DISCONTINUED | OUTPATIENT
Start: 2023-08-23 | End: 2023-08-25 | Stop reason: HOSPADM

## 2023-08-23 RX ORDER — ACETAMINOPHEN 325 MG/1
650 TABLET ORAL EVERY 4 HOURS
Status: DISCONTINUED | OUTPATIENT
Start: 2023-08-23 | End: 2023-08-25 | Stop reason: HOSPADM

## 2023-08-23 RX ORDER — METHOCARBAMOL 500 MG/1
500 TABLET, FILM COATED ORAL EVERY 8 HOURS
Status: DISCONTINUED | OUTPATIENT
Start: 2023-08-23 | End: 2023-08-25 | Stop reason: HOSPADM

## 2023-08-23 RX ORDER — ADHESIVE BANDAGE
30 BANDAGE TOPICAL DAILY PRN
Status: DISCONTINUED | OUTPATIENT
Start: 2023-08-23 | End: 2023-08-25 | Stop reason: HOSPADM

## 2023-08-23 RX ORDER — IBUPROFEN 200 MG
24 TABLET ORAL
Status: DISCONTINUED | OUTPATIENT
Start: 2023-08-23 | End: 2023-08-25 | Stop reason: HOSPADM

## 2023-08-23 RX ORDER — PROPOFOL 10 MG/ML
VIAL (ML) INTRAVENOUS CODE/TRAUMA/SEDATION MEDICATION
Status: COMPLETED | OUTPATIENT
Start: 2023-08-23 | End: 2023-08-23

## 2023-08-23 RX ORDER — FENTANYL CITRATE 50 UG/ML
INJECTION, SOLUTION INTRAMUSCULAR; INTRAVENOUS CODE/TRAUMA/SEDATION MEDICATION
Status: COMPLETED | OUTPATIENT
Start: 2023-08-23 | End: 2023-08-23

## 2023-08-23 RX ORDER — ONDANSETRON 2 MG/ML
INJECTION INTRAMUSCULAR; INTRAVENOUS
Status: DISPENSED
Start: 2023-08-23 | End: 2023-08-24

## 2023-08-23 RX ADMIN — CEFAZOLIN SODIUM 2 G: 2 SOLUTION INTRAVENOUS at 08:08

## 2023-08-23 RX ADMIN — METOPROLOL TARTRATE 5 MG: 1 INJECTION, SOLUTION INTRAVENOUS at 09:08

## 2023-08-23 RX ADMIN — DOCUSATE SODIUM 100 MG: 100 CAPSULE, LIQUID FILLED ORAL at 10:08

## 2023-08-23 RX ADMIN — METOPROLOL TARTRATE 5 MG: 1 INJECTION, SOLUTION INTRAVENOUS at 10:08

## 2023-08-23 RX ADMIN — ACETAMINOPHEN 650 MG: 325 TABLET, FILM COATED ORAL at 10:08

## 2023-08-23 RX ADMIN — ENOXAPARIN SODIUM 40 MG: 40 INJECTION SUBCUTANEOUS at 10:08

## 2023-08-23 RX ADMIN — TETANUS TOXOID, REDUCED DIPHTHERIA TOXOID AND ACELLULAR PERTUSSIS VACCINE, ADSORBED 0.5 ML: 5; 2.5; 8; 8; 2.5 SUSPENSION INTRAMUSCULAR at 08:08

## 2023-08-23 RX ADMIN — OXYCODONE HYDROCHLORIDE 10 MG: 10 TABLET ORAL at 11:08

## 2023-08-23 RX ADMIN — PROPOFOL 70 MG: 10 INJECTION, EMULSION INTRAVENOUS at 08:08

## 2023-08-23 RX ADMIN — SODIUM CHLORIDE, POTASSIUM CHLORIDE, SODIUM LACTATE AND CALCIUM CHLORIDE: 600; 310; 30; 20 INJECTION, SOLUTION INTRAVENOUS at 11:08

## 2023-08-23 RX ADMIN — GABAPENTIN 300 MG: 300 CAPSULE ORAL at 10:08

## 2023-08-23 RX ADMIN — ONDANSETRON 4 MG: 2 INJECTION INTRAMUSCULAR; INTRAVENOUS at 08:08

## 2023-08-23 RX ADMIN — METHOCARBAMOL 500 MG: 500 TABLET ORAL at 10:08

## 2023-08-23 RX ADMIN — FENTANYL CITRATE 50 MCG: 50 INJECTION, SOLUTION INTRAMUSCULAR; INTRAVENOUS at 08:08

## 2023-08-24 ENCOUNTER — ANESTHESIA (OUTPATIENT)
Dept: SURGERY | Facility: HOSPITAL | Age: 63
DRG: 501 | End: 2023-08-24
Payer: OTHER MISCELLANEOUS

## 2023-08-24 ENCOUNTER — ANESTHESIA EVENT (OUTPATIENT)
Dept: SURGERY | Facility: HOSPITAL | Age: 63
DRG: 501 | End: 2023-08-24
Payer: OTHER MISCELLANEOUS

## 2023-08-24 PROBLEM — S52.502B OPEN FRACTURE OF LEFT DISTAL RADIUS: Status: ACTIVE | Noted: 2023-08-24

## 2023-08-24 LAB
ABORH RETYPE: NORMAL
ALBUMIN SERPL-MCNC: 3.6 G/DL (ref 3.4–4.8)
ALBUMIN/GLOB SERPL: 1.4 RATIO (ref 1.1–2)
ALP SERPL-CCNC: 82 UNIT/L (ref 40–150)
ALT SERPL-CCNC: 57 UNIT/L (ref 0–55)
AST SERPL-CCNC: 47 UNIT/L (ref 5–34)
AV INDEX (PROSTH): 0.58
AV MEAN GRADIENT: 4 MMHG
AV PEAK GRADIENT: 7 MMHG
AV VELOCITY RATIO: 0.69
BASOPHILS # BLD AUTO: 0.06 X10(3)/MCL
BASOPHILS NFR BLD AUTO: 0.7 %
BILIRUB SERPL-MCNC: 1 MG/DL
BSA FOR ECHO PROCEDURE: 2.22 M2
BUN SERPL-MCNC: 15.9 MG/DL (ref 9.8–20.1)
CALCIUM SERPL-MCNC: 9 MG/DL (ref 8.4–10.2)
CHLORIDE SERPL-SCNC: 104 MMOL/L (ref 98–107)
CO2 SERPL-SCNC: 25 MMOL/L (ref 23–31)
CREAT SERPL-MCNC: 0.63 MG/DL (ref 0.55–1.02)
CRP SERPL-MCNC: 1.8 MG/L
CV ECHO LV RWT: 0.65 CM
DOP CALC AO PEAK VEL: 1.34 M/S
DOP CALC AO VTI: 20.9 CM
DOP CALC LVOT PEAK VEL: 0.93 M/S
DOP CALCLVOT PEAK VEL VTI: 12.1 CM
E WAVE DECELERATION TIME: 103 MSEC
E/E' RATIO: 13.47 M/S
ECHO LV POSTERIOR WALL: 1.2 CM (ref 0.6–1.1)
EOSINOPHIL # BLD AUTO: 0.1 X10(3)/MCL (ref 0–0.9)
EOSINOPHIL NFR BLD AUTO: 1.1 %
ERYTHROCYTE [DISTWIDTH] IN BLOOD BY AUTOMATED COUNT: 14.1 % (ref 11.5–17)
FRACTIONAL SHORTENING: 27 % (ref 28–44)
GFR SERPLBLD CREATININE-BSD FMLA CKD-EPI: >60 MLS/MIN/1.73/M2
GLOBULIN SER-MCNC: 2.6 GM/DL (ref 2.4–3.5)
GLUCOSE SERPL-MCNC: 167 MG/DL (ref 82–115)
GROUP & RH: NORMAL
HCT VFR BLD AUTO: 35.7 % (ref 37–47)
HGB BLD-MCNC: 11.9 G/DL (ref 12–16)
IMM GRANULOCYTES # BLD AUTO: 0.02 X10(3)/MCL (ref 0–0.04)
IMM GRANULOCYTES NFR BLD AUTO: 0.2 %
INDIRECT COOMBS GEL: NORMAL
INTERVENTRICULAR SEPTUM: 1.4 CM (ref 0.6–1.1)
LEFT ATRIUM SIZE: 3.1 CM
LEFT ATRIUM VOLUME INDEX MOD: 19.2 ML/M2
LEFT ATRIUM VOLUME MOD: 40.6 CM3
LEFT INTERNAL DIMENSION IN SYSTOLE: 2.7 CM (ref 2.1–4)
LEFT VENTRICLE DIASTOLIC VOLUME INDEX: 27.54 ML/M2
LEFT VENTRICLE DIASTOLIC VOLUME: 58.1 ML
LEFT VENTRICLE MASS INDEX: 79 G/M2
LEFT VENTRICLE SYSTOLIC VOLUME INDEX: 12.8 ML/M2
LEFT VENTRICLE SYSTOLIC VOLUME: 27 ML
LEFT VENTRICULAR INTERNAL DIMENSION IN DIASTOLE: 3.7 CM (ref 3.5–6)
LEFT VENTRICULAR MASS: 166.5 G
LV LATERAL E/E' RATIO: 14.43 M/S
LV SEPTAL E/E' RATIO: 12.63 M/S
LVOT MG: 2 MMHG
LVOT MV: 0.65 CM/S
LYMPHOCYTES # BLD AUTO: 1.87 X10(3)/MCL (ref 0.6–4.6)
LYMPHOCYTES NFR BLD AUTO: 20.7 %
MAGNESIUM SERPL-MCNC: 1.4 MG/DL (ref 1.6–2.6)
MCH RBC QN AUTO: 28.7 PG (ref 27–31)
MCHC RBC AUTO-ENTMCNC: 33.3 G/DL (ref 33–36)
MCV RBC AUTO: 86.2 FL (ref 80–94)
MONOCYTES # BLD AUTO: 0.77 X10(3)/MCL (ref 0.1–1.3)
MONOCYTES NFR BLD AUTO: 8.5 %
MV PEAK E VEL: 1.01 M/S
NEUTROPHILS # BLD AUTO: 6.23 X10(3)/MCL (ref 2.1–9.2)
NEUTROPHILS NFR BLD AUTO: 68.8 %
NRBC BLD AUTO-RTO: 0 %
OHS LV EJECTION FRACTION SIMPSONS BIPLANE MOD: 44 %
PHOSPHATE SERPL-MCNC: 3.5 MG/DL (ref 2.3–4.7)
PLATELET # BLD AUTO: 188 X10(3)/MCL (ref 130–400)
PMV BLD AUTO: 10 FL (ref 7.4–10.4)
POCT GLUCOSE: 142 MG/DL (ref 70–110)
POCT GLUCOSE: 168 MG/DL (ref 70–110)
POCT GLUCOSE: 187 MG/DL (ref 70–110)
POTASSIUM SERPL-SCNC: 3.7 MMOL/L (ref 3.5–5.1)
PREALB SERPL-MCNC: 23.6 MG/DL (ref 14–37)
PROT SERPL-MCNC: 6.2 GM/DL (ref 5.8–7.6)
PV PEAK GRADIENT: 4 MMHG
PV PEAK VELOCITY: 0.96 M/S
RBC # BLD AUTO: 4.14 X10(6)/MCL (ref 4.2–5.4)
RIGHT VENTRICULAR END-DIASTOLIC DIMENSION: 3.2 CM
SODIUM SERPL-SCNC: 138 MMOL/L (ref 136–145)
SPECIMEN OUTDATE: NORMAL
TDI LATERAL: 0.07 M/S
TDI SEPTAL: 0.08 M/S
TDI: 0.08 M/S
WBC # SPEC AUTO: 9.05 X10(3)/MCL (ref 4.5–11.5)
Z-SCORE OF LEFT VENTRICULAR DIMENSION IN END DIASTOLE: -5.81
Z-SCORE OF LEFT VENTRICULAR DIMENSION IN END SYSTOLE: -3.18

## 2023-08-24 PROCEDURE — 86140 C-REACTIVE PROTEIN: CPT | Performed by: NURSE PRACTITIONER

## 2023-08-24 PROCEDURE — 25000003 PHARM REV CODE 250: Performed by: NURSE PRACTITIONER

## 2023-08-24 PROCEDURE — 36000710: Performed by: ORTHOPAEDIC SURGERY

## 2023-08-24 PROCEDURE — 63600175 PHARM REV CODE 636 W HCPCS: Performed by: NURSE PRACTITIONER

## 2023-08-24 PROCEDURE — 21400001 HC TELEMETRY ROOM

## 2023-08-24 PROCEDURE — 27201423 OPTIME MED/SURG SUP & DEVICES STERILE SUPPLY: Performed by: ORTHOPAEDIC SURGERY

## 2023-08-24 PROCEDURE — 99223 PR INITIAL HOSPITAL CARE,LEVL III: ICD-10-PCS | Mod: 57,,, | Performed by: ORTHOPAEDIC SURGERY

## 2023-08-24 PROCEDURE — 63600175 PHARM REV CODE 636 W HCPCS: Performed by: ORTHOPAEDIC SURGERY

## 2023-08-24 PROCEDURE — 84100 ASSAY OF PHOSPHORUS: CPT | Performed by: NURSE PRACTITIONER

## 2023-08-24 PROCEDURE — 63600175 PHARM REV CODE 636 W HCPCS: Performed by: NURSE ANESTHETIST, CERTIFIED REGISTERED

## 2023-08-24 PROCEDURE — 25000003 PHARM REV CODE 250: Performed by: NURSE ANESTHETIST, CERTIFIED REGISTERED

## 2023-08-24 PROCEDURE — 82962 GLUCOSE BLOOD TEST: CPT | Performed by: ORTHOPAEDIC SURGERY

## 2023-08-24 PROCEDURE — D9220A PRA ANESTHESIA: Mod: ANES,,, | Performed by: ANESTHESIOLOGY

## 2023-08-24 PROCEDURE — 36000711: Performed by: ORTHOPAEDIC SURGERY

## 2023-08-24 PROCEDURE — 85025 COMPLETE CBC W/AUTO DIFF WBC: CPT | Performed by: NURSE PRACTITIONER

## 2023-08-24 PROCEDURE — D9220A PRA ANESTHESIA: Mod: CRNA,,, | Performed by: NURSE ANESTHETIST, CERTIFIED REGISTERED

## 2023-08-24 PROCEDURE — 84134 ASSAY OF PREALBUMIN: CPT | Performed by: NURSE PRACTITIONER

## 2023-08-24 PROCEDURE — 25545 PR OPEN TREATMENT OF ULNAR SHAFT FRACTURE: ICD-10-PCS | Mod: 51,LT,, | Performed by: ORTHOPAEDIC SURGERY

## 2023-08-24 PROCEDURE — 25500020 PHARM REV CODE 255: Performed by: INTERNAL MEDICINE

## 2023-08-24 PROCEDURE — C1776 JOINT DEVICE (IMPLANTABLE): HCPCS | Performed by: ORTHOPAEDIC SURGERY

## 2023-08-24 PROCEDURE — 71000033 HC RECOVERY, INTIAL HOUR: Performed by: ORTHOPAEDIC SURGERY

## 2023-08-24 PROCEDURE — 37000009 HC ANESTHESIA EA ADD 15 MINS: Performed by: ORTHOPAEDIC SURGERY

## 2023-08-24 PROCEDURE — 83735 ASSAY OF MAGNESIUM: CPT | Performed by: NURSE PRACTITIONER

## 2023-08-24 PROCEDURE — 37000008 HC ANESTHESIA 1ST 15 MINUTES: Performed by: ORTHOPAEDIC SURGERY

## 2023-08-24 PROCEDURE — 25609 OPTX DST RD XART FX/EP SEP3+: CPT | Mod: AS,LT,, | Performed by: PHYSICIAN ASSISTANT

## 2023-08-24 PROCEDURE — D9220A PRA ANESTHESIA: ICD-10-PCS | Mod: CRNA,,, | Performed by: NURSE ANESTHETIST, CERTIFIED REGISTERED

## 2023-08-24 PROCEDURE — 25609 PR OPEN RX DISTAL RADIUS FX, INTRA-ARTICULAR, 3+ FRAG: ICD-10-PCS | Mod: AS,LT,, | Performed by: PHYSICIAN ASSISTANT

## 2023-08-24 PROCEDURE — D9220A PRA ANESTHESIA: ICD-10-PCS | Mod: ANES,,, | Performed by: ANESTHESIOLOGY

## 2023-08-24 PROCEDURE — 25000003 PHARM REV CODE 250: Performed by: ORTHOPAEDIC SURGERY

## 2023-08-24 PROCEDURE — 11000001 HC ACUTE MED/SURG PRIVATE ROOM

## 2023-08-24 PROCEDURE — 11012 DEB SKIN BONE AT FX SITE: CPT | Mod: 51,,, | Performed by: ORTHOPAEDIC SURGERY

## 2023-08-24 PROCEDURE — 71000039 HC RECOVERY, EACH ADD'L HOUR: Performed by: ORTHOPAEDIC SURGERY

## 2023-08-24 PROCEDURE — 25545 PR OPEN TREATMENT OF ULNAR SHAFT FRACTURE: ICD-10-PCS | Mod: 51,AS,LT, | Performed by: PHYSICIAN ASSISTANT

## 2023-08-24 PROCEDURE — 80053 COMPREHEN METABOLIC PANEL: CPT | Performed by: NURSE PRACTITIONER

## 2023-08-24 PROCEDURE — 25609 PR OPEN RX DISTAL RADIUS FX, INTRA-ARTICULAR, 3+ FRAG: ICD-10-PCS | Mod: LT,,, | Performed by: ORTHOPAEDIC SURGERY

## 2023-08-24 PROCEDURE — 25609 OPTX DST RD XART FX/EP SEP3+: CPT | Mod: LT,,, | Performed by: ORTHOPAEDIC SURGERY

## 2023-08-24 PROCEDURE — 99223 1ST HOSP IP/OBS HIGH 75: CPT | Mod: 57,,, | Performed by: ORTHOPAEDIC SURGERY

## 2023-08-24 PROCEDURE — 11012 PR DEBRIDE ASSOC OPEN FX/DISLO SKIN/MUS/BONE: ICD-10-PCS | Mod: 51,,, | Performed by: ORTHOPAEDIC SURGERY

## 2023-08-24 PROCEDURE — 25545 OPTX ULNAR SHFT FX INT FIXJ: CPT | Mod: 51,LT,, | Performed by: ORTHOPAEDIC SURGERY

## 2023-08-24 PROCEDURE — C1713 ANCHOR/SCREW BN/BN,TIS/BN: HCPCS | Performed by: ORTHOPAEDIC SURGERY

## 2023-08-24 PROCEDURE — 25545 OPTX ULNAR SHFT FX INT FIXJ: CPT | Mod: 51,AS,LT, | Performed by: PHYSICIAN ASSISTANT

## 2023-08-24 PROCEDURE — 86900 BLOOD TYPING SEROLOGIC ABO: CPT | Performed by: ORTHOPAEDIC SURGERY

## 2023-08-24 DEVICE — SCREW BONE LOCK TI 10X3MM
Type: IMPLANTABLE DEVICE | Site: RADIUS | Status: NON-FUNCTIONAL
Removed: 2023-12-20

## 2023-08-24 DEVICE — PLATE GEMINUS DORSAL SPANNING
Type: IMPLANTABLE DEVICE | Site: RADIUS | Status: NON-FUNCTIONAL
Removed: 2023-12-20

## 2023-08-24 DEVICE — IMPLANTABLE DEVICE: Type: IMPLANTABLE DEVICE | Site: ULNA | Status: FUNCTIONAL

## 2023-08-24 DEVICE — SCREW BONE COMPR 14X3MM
Type: IMPLANTABLE DEVICE | Site: RADIUS | Status: NON-FUNCTIONAL
Removed: 2023-12-20

## 2023-08-24 DEVICE — IMPLANTABLE DEVICE
Type: IMPLANTABLE DEVICE | Site: RADIUS | Status: NON-FUNCTIONAL
Removed: 2023-12-20

## 2023-08-24 DEVICE — SCREW BONE TI LOCK 12X3MM
Type: IMPLANTABLE DEVICE | Site: RADIUS | Status: NON-FUNCTIONAL
Removed: 2023-12-20

## 2023-08-24 RX ORDER — MEPERIDINE HYDROCHLORIDE 25 MG/ML
12.5 INJECTION INTRAMUSCULAR; INTRAVENOUS; SUBCUTANEOUS ONCE
Status: DISCONTINUED | OUTPATIENT
Start: 2023-08-24 | End: 2023-08-24 | Stop reason: HOSPADM

## 2023-08-24 RX ORDER — ROCURONIUM BROMIDE 10 MG/ML
INJECTION, SOLUTION INTRAVENOUS
Status: DISCONTINUED | OUTPATIENT
Start: 2023-08-24 | End: 2023-08-24

## 2023-08-24 RX ORDER — SOTALOL HYDROCHLORIDE 120 MG/1
120 TABLET ORAL 2 TIMES DAILY
Status: DISCONTINUED | OUTPATIENT
Start: 2023-08-24 | End: 2023-08-25 | Stop reason: HOSPADM

## 2023-08-24 RX ORDER — ASCORBIC ACID 250 MG
500 TABLET ORAL DAILY
Status: DISCONTINUED | OUTPATIENT
Start: 2023-08-24 | End: 2023-08-25 | Stop reason: HOSPADM

## 2023-08-24 RX ORDER — DIGOXIN 0.25 MG/ML
500 INJECTION INTRAMUSCULAR; INTRAVENOUS ONCE
Status: COMPLETED | OUTPATIENT
Start: 2023-08-24 | End: 2023-08-24

## 2023-08-24 RX ORDER — HYDROMORPHONE HYDROCHLORIDE 2 MG/ML
0.5 INJECTION, SOLUTION INTRAMUSCULAR; INTRAVENOUS; SUBCUTANEOUS EVERY 5 MIN PRN
Status: DISCONTINUED | OUTPATIENT
Start: 2023-08-24 | End: 2023-08-24 | Stop reason: HOSPADM

## 2023-08-24 RX ORDER — MIDAZOLAM HYDROCHLORIDE 1 MG/ML
INJECTION INTRAMUSCULAR; INTRAVENOUS
Status: DISCONTINUED | OUTPATIENT
Start: 2023-08-24 | End: 2023-08-24

## 2023-08-24 RX ORDER — LIDOCAINE HYDROCHLORIDE 20 MG/ML
INJECTION, SOLUTION EPIDURAL; INFILTRATION; INTRACAUDAL; PERINEURAL
Status: DISCONTINUED | OUTPATIENT
Start: 2023-08-24 | End: 2023-08-24

## 2023-08-24 RX ORDER — ONDANSETRON 2 MG/ML
4 INJECTION INTRAMUSCULAR; INTRAVENOUS ONCE
Status: DISCONTINUED | OUTPATIENT
Start: 2023-08-24 | End: 2023-08-24 | Stop reason: HOSPADM

## 2023-08-24 RX ORDER — HYDROMORPHONE HYDROCHLORIDE 2 MG/ML
0.2 INJECTION, SOLUTION INTRAMUSCULAR; INTRAVENOUS; SUBCUTANEOUS EVERY 5 MIN PRN
Status: DISCONTINUED | OUTPATIENT
Start: 2023-08-24 | End: 2023-08-24 | Stop reason: HOSPADM

## 2023-08-24 RX ORDER — ESMOLOL HYDROCHLORIDE 10 MG/ML
INJECTION INTRAVENOUS
Status: DISCONTINUED | OUTPATIENT
Start: 2023-08-24 | End: 2023-08-24

## 2023-08-24 RX ORDER — PROPOFOL 10 MG/ML
VIAL (ML) INTRAVENOUS
Status: DISCONTINUED | OUTPATIENT
Start: 2023-08-24 | End: 2023-08-24

## 2023-08-24 RX ORDER — ONDANSETRON 2 MG/ML
4 INJECTION INTRAMUSCULAR; INTRAVENOUS EVERY 6 HOURS PRN
Status: DISCONTINUED | OUTPATIENT
Start: 2023-08-24 | End: 2023-08-25 | Stop reason: HOSPADM

## 2023-08-24 RX ORDER — DIPHENHYDRAMINE HYDROCHLORIDE 50 MG/ML
25 INJECTION INTRAMUSCULAR; INTRAVENOUS EVERY 6 HOURS PRN
Status: DISCONTINUED | OUTPATIENT
Start: 2023-08-24 | End: 2023-08-24 | Stop reason: HOSPADM

## 2023-08-24 RX ORDER — MORPHINE SULFATE 4 MG/ML
4 INJECTION, SOLUTION INTRAMUSCULAR; INTRAVENOUS EVERY 6 HOURS PRN
Status: DISCONTINUED | OUTPATIENT
Start: 2023-08-24 | End: 2023-08-25 | Stop reason: HOSPADM

## 2023-08-24 RX ORDER — ONDANSETRON 2 MG/ML
INJECTION INTRAMUSCULAR; INTRAVENOUS
Status: DISCONTINUED | OUTPATIENT
Start: 2023-08-24 | End: 2023-08-24

## 2023-08-24 RX ORDER — GLUCOSAM/CHONDRO/HERB 149/HYAL 750-100 MG
1 TABLET ORAL DAILY
Status: DISCONTINUED | OUTPATIENT
Start: 2023-08-24 | End: 2023-08-25 | Stop reason: HOSPADM

## 2023-08-24 RX ORDER — CELECOXIB 100 MG/1
200 CAPSULE ORAL DAILY
Status: DISCONTINUED | OUTPATIENT
Start: 2023-08-24 | End: 2023-08-25 | Stop reason: HOSPADM

## 2023-08-24 RX ORDER — CETIRIZINE HYDROCHLORIDE 10 MG/1
10 TABLET ORAL DAILY
Status: DISCONTINUED | OUTPATIENT
Start: 2023-08-24 | End: 2023-08-25 | Stop reason: HOSPADM

## 2023-08-24 RX ORDER — DEXAMETHASONE SODIUM PHOSPHATE 4 MG/ML
INJECTION, SOLUTION INTRA-ARTICULAR; INTRALESIONAL; INTRAMUSCULAR; INTRAVENOUS; SOFT TISSUE
Status: DISCONTINUED | OUTPATIENT
Start: 2023-08-24 | End: 2023-08-24

## 2023-08-24 RX ORDER — DILTIAZEM HYDROCHLORIDE 5 MG/ML
INJECTION INTRAVENOUS
Status: DISCONTINUED | OUTPATIENT
Start: 2023-08-24 | End: 2023-08-24

## 2023-08-24 RX ORDER — DIGOXIN 0.25 MG/ML
250 INJECTION INTRAMUSCULAR; INTRAVENOUS EVERY 6 HOURS
Status: COMPLETED | OUTPATIENT
Start: 2023-08-24 | End: 2023-08-24

## 2023-08-24 RX ORDER — FLUTICASONE PROPIONATE 50 MCG
1 SPRAY, SUSPENSION (ML) NASAL DAILY
Status: DISCONTINUED | OUTPATIENT
Start: 2023-08-24 | End: 2023-08-25 | Stop reason: HOSPADM

## 2023-08-24 RX ORDER — CEFAZOLIN SODIUM 1 G/3ML
INJECTION, POWDER, FOR SOLUTION INTRAMUSCULAR; INTRAVENOUS
Status: DISCONTINUED | OUTPATIENT
Start: 2023-08-24 | End: 2023-08-24

## 2023-08-24 RX ORDER — POTASSIUM CHLORIDE 14.9 MG/ML
20 INJECTION INTRAVENOUS ONCE
Status: COMPLETED | OUTPATIENT
Start: 2023-08-24 | End: 2023-08-24

## 2023-08-24 RX ORDER — CHOLECALCIFEROL (VITAMIN D3) 25 MCG
1000 TABLET ORAL DAILY
Status: DISCONTINUED | OUTPATIENT
Start: 2023-08-24 | End: 2023-08-25 | Stop reason: HOSPADM

## 2023-08-24 RX ORDER — FENTANYL CITRATE 50 UG/ML
INJECTION, SOLUTION INTRAMUSCULAR; INTRAVENOUS
Status: DISCONTINUED | OUTPATIENT
Start: 2023-08-24 | End: 2023-08-24

## 2023-08-24 RX ORDER — CEFAZOLIN SODIUM 2 G/50ML
2 SOLUTION INTRAVENOUS
Status: DISCONTINUED | OUTPATIENT
Start: 2023-08-24 | End: 2023-08-25 | Stop reason: HOSPADM

## 2023-08-24 RX ORDER — MAGNESIUM SULFATE HEPTAHYDRATE 40 MG/ML
4 INJECTION, SOLUTION INTRAVENOUS ONCE
Status: COMPLETED | OUTPATIENT
Start: 2023-08-24 | End: 2023-08-24

## 2023-08-24 RX ADMIN — OXYCODONE HYDROCHLORIDE 10 MG: 10 TABLET ORAL at 05:08

## 2023-08-24 RX ADMIN — SOTALOL HYDROCHLORIDE 120 MG: 120 TABLET ORAL at 08:08

## 2023-08-24 RX ADMIN — DIGOXIN 500 MCG: 0.25 INJECTION INTRAMUSCULAR; INTRAVENOUS at 11:08

## 2023-08-24 RX ADMIN — PERFLUTREN 3 ML: 6.52 INJECTION, SUSPENSION INTRAVENOUS at 10:08

## 2023-08-24 RX ADMIN — DILTIAZEM HYDROCHLORIDE 10 MG: 5 INJECTION INTRAVENOUS at 12:08

## 2023-08-24 RX ADMIN — LIDOCAINE HYDROCHLORIDE 40 MG: 20 INJECTION, SOLUTION EPIDURAL; INFILTRATION; INTRACAUDAL; PERINEURAL at 12:08

## 2023-08-24 RX ADMIN — ESMOLOL HYDROCHLORIDE 10 MG: 100 INJECTION, SOLUTION INTRAVENOUS at 12:08

## 2023-08-24 RX ADMIN — ONDANSETRON 4 MG: 2 INJECTION INTRAMUSCULAR; INTRAVENOUS at 07:08

## 2023-08-24 RX ADMIN — ACETAMINOPHEN 650 MG: 325 TABLET, FILM COATED ORAL at 05:08

## 2023-08-24 RX ADMIN — METOPROLOL TARTRATE 5 MG: 1 INJECTION, SOLUTION INTRAVENOUS at 12:08

## 2023-08-24 RX ADMIN — SODIUM CHLORIDE, POTASSIUM CHLORIDE, SODIUM LACTATE AND CALCIUM CHLORIDE: 600; 310; 30; 20 INJECTION, SOLUTION INTRAVENOUS at 08:08

## 2023-08-24 RX ADMIN — POTASSIUM CHLORIDE 20 MEQ: 14.9 INJECTION, SOLUTION INTRAVENOUS at 12:08

## 2023-08-24 RX ADMIN — CEFAZOLIN 2 G: 330 INJECTION, POWDER, FOR SOLUTION INTRAMUSCULAR; INTRAVENOUS at 12:08

## 2023-08-24 RX ADMIN — SUGAMMADEX 200 MG: 100 INJECTION, SOLUTION INTRAVENOUS at 02:08

## 2023-08-24 RX ADMIN — MAGNESIUM SULFATE 4 G: 2 INJECTION INTRAVENOUS at 11:08

## 2023-08-24 RX ADMIN — ROCURONIUM BROMIDE 50 MG: 10 SOLUTION INTRAVENOUS at 12:08

## 2023-08-24 RX ADMIN — ACETAMINOPHEN 650 MG: 325 TABLET, FILM COATED ORAL at 11:08

## 2023-08-24 RX ADMIN — PROPOFOL 150 MG: 10 INJECTION, EMULSION INTRAVENOUS at 12:08

## 2023-08-24 RX ADMIN — GABAPENTIN 300 MG: 300 CAPSULE ORAL at 05:08

## 2023-08-24 RX ADMIN — DIGOXIN 250 MCG: 0.25 INJECTION INTRAMUSCULAR; INTRAVENOUS at 11:08

## 2023-08-24 RX ADMIN — GABAPENTIN 300 MG: 300 CAPSULE ORAL at 08:08

## 2023-08-24 RX ADMIN — DILTIAZEM HYDROCHLORIDE 15 MG/HR: 5 INJECTION INTRAVENOUS at 02:08

## 2023-08-24 RX ADMIN — METHOCARBAMOL 500 MG: 500 TABLET ORAL at 05:08

## 2023-08-24 RX ADMIN — FENTANYL CITRATE 100 MCG: 50 INJECTION, SOLUTION INTRAMUSCULAR; INTRAVENOUS at 12:08

## 2023-08-24 RX ADMIN — DILTIAZEM HYDROCHLORIDE 15 MG/HR: 5 INJECTION INTRAVENOUS at 06:08

## 2023-08-24 RX ADMIN — SODIUM CHLORIDE, POTASSIUM CHLORIDE, SODIUM LACTATE AND CALCIUM CHLORIDE: 600; 310; 30; 20 INJECTION, SOLUTION INTRAVENOUS at 06:08

## 2023-08-24 RX ADMIN — DIGOXIN 250 MCG: 0.25 INJECTION INTRAMUSCULAR; INTRAVENOUS at 05:08

## 2023-08-24 RX ADMIN — ACETAMINOPHEN 650 MG: 325 TABLET, FILM COATED ORAL at 01:08

## 2023-08-24 RX ADMIN — ONDANSETRON 4 MG: 2 INJECTION INTRAMUSCULAR; INTRAVENOUS at 02:08

## 2023-08-24 RX ADMIN — ENOXAPARIN SODIUM 40 MG: 40 INJECTION SUBCUTANEOUS at 08:08

## 2023-08-24 RX ADMIN — SODIUM CHLORIDE, SODIUM GLUCONATE, SODIUM ACETATE, POTASSIUM CHLORIDE AND MAGNESIUM CHLORIDE: 526; 502; 368; 37; 30 INJECTION, SOLUTION INTRAVENOUS at 12:08

## 2023-08-24 RX ADMIN — CEFAZOLIN SODIUM 2 G: 2 SOLUTION INTRAVENOUS at 05:08

## 2023-08-24 RX ADMIN — DEXAMETHASONE SODIUM PHOSPHATE 4 MG: 4 INJECTION, SOLUTION INTRA-ARTICULAR; INTRALESIONAL; INTRAMUSCULAR; INTRAVENOUS; SOFT TISSUE at 12:08

## 2023-08-24 RX ADMIN — CEFAZOLIN SODIUM 2 G: 2 SOLUTION INTRAVENOUS at 10:08

## 2023-08-24 RX ADMIN — ESMOLOL HYDROCHLORIDE 20 MG: 100 INJECTION, SOLUTION INTRAVENOUS at 12:08

## 2023-08-24 RX ADMIN — DILTIAZEM HYDROCHLORIDE 10 MG/HR: 5 INJECTION INTRAVENOUS at 12:08

## 2023-08-24 RX ADMIN — METOPROLOL TARTRATE 5 MG: 1 INJECTION, SOLUTION INTRAVENOUS at 02:08

## 2023-08-24 RX ADMIN — MIDAZOLAM HYDROCHLORIDE 2 MG: 1 INJECTION, SOLUTION INTRAMUSCULAR; INTRAVENOUS at 12:08

## 2023-08-24 RX ADMIN — METOPROLOL TARTRATE 5 MG: 1 INJECTION, SOLUTION INTRAVENOUS at 01:08

## 2023-08-24 NOTE — H&P
Trauma Surgery   Activation Note    Patient Name: Lauren Kaba  MRN: [id number]   YOB: 1960  Date: 08/23/2023    LEVEL 2 TRAUMA     Subjective:   History of present illness: Patient is an approximately 63 year old female tripped over the curb while leaving work.  Fell forward and hit the left outstretched wrist and face.  Pain primarily to the left wrist.  Received 200 mcg of fentanyl EN route by EMS.  Has an obvious deformity with a very small puncture wound to the ulnar aspect of the left wrist.  Hemostatic.  Tachycardic in AFib initially which became tachycardia and sinus rhythm after pain medications.    Primary Survey:  A Patent. Speaking.    B Normal WOB. No CHACON.   C No active bleeding requiring intervention. Pulses intact.    D GCS 15 (E 4, V 5, M 6)    E exposed, log-rolled and examined (see below)   F See below     VITAL SIGNS: 24 HR MIN & MAX LAST   No data recorded      No data recorded       No data recorded       No data recorded       No data recorded         HT:    WT:    BMI:       FAST:  Deferred    Medications/transfusions received en-route:  100 mcg fentanyl  Medications/transfusions received in trauma bay:  50 mcg of fentanyl, 4 mg Zofran, conscious sedation, tetanus, Ancef    Scheduled Meds:   ceFAZolin        fentaNYL        ondansetron        propofol         Continuous Infusions:   ceFAZolin (ANCEF) IVPB 2 g (08/23/23 2013)     PRN Meds:ceFAZolin, ceFAZolin (ANCEF) IVPB, fentaNYL, fentaNYL, ondansetron, ondansetron, propofol    ROS: 12 point ROS negative except as stated in HPI    Allergies:  Cipro  PMH:  AFib on Eliquis, diabetes  PSH:  Cardioversion, cardiac ablation, breast reduction  Social history:  Noncontributory  Objective:   Secondary Survey:   General: Well developed, well nourished, no acute distress, AAOx3  Neuro: CNII-XII grossly intact  HEENT:  Normocephalic, atraumatic, PERRL, cervical collar in place  CV:  RRR  Pulse: 2+ RP b/l, 2+ DP b/l   Resp:   Non-labored breathing, satting on room air initially, did require nasal cannula for conscious sedation  GI:  Abdomen soft, non-tender, non-distended  :  Normal external  genitalia,  no blood at urethral meatus.   Rectal: Normal tone, no gross blood.  Extremities: Moves all 4 spontaneously and purposefully, no obvious gross deformities.  Back/Spine: No bony TTP, no palpable step offs or deformities.  Cervical back: Normal. No tenderness.  Thoracic back: Normal. No tenderness.  Lumbar back: Normal. No tenderness.  Skin/wounds:  Warm, well perfused, obvious deformity and hematoma to the left wrist.  Has abrasions to the nose and chin  Psych: Normal mood and affect.    Labs:  We will follow    Imaging:  Ulna radius displaced fractures, improved alignment after reduction and splinting.  Remainder pending.    Assessment & Plan:   63-year-old female with a history of AFib on blood thinners with the distal ulna radius fracture on the left with a punctate wound on the ulnar aspect.    I will notify Orthopedics once imaging complete.    Addendum 01/20/2042:  Imaging reviewed.  Orthopedics aware of patient.  We will admit.  Operating room tomorrow.  Okay for diet today.  NPO at midnight.  Sliding scale insulin.  Lovenox.  Nonweightbearing left upper extremity.  We will give additional staff metoprolol doses for AFib RVR.  Patient has taken her home beta-blockade as well as her blood thinner this afternoon.  If heart rate does not improve we will consult Cardiology.  Does need optimization for operating room tomorrow.

## 2023-08-24 NOTE — NURSING
Nurses Note -- 4 Eyes      8/24/2023   12:56 AM      Skin assessed during: Admit      [x] No Altered Skin Integrity Present    []Prevention Measures Documented      [] Yes- Altered Skin Integrity Present or Discovered   [] LDA Added if Not in Epic (Describe Wound)   [] New Altered Skin Integrity was Present on Admit and Documented in LDA   [] Wound Image Taken    Wound Care Consulted? No    Attending Nurse:  Darrell Ann RN/Staff Member:   Becki Moreau RN    Abrasions to nose and chin

## 2023-08-24 NOTE — CONSULTS
[facility]  Orthopedic Trauma  Consult Note    Patient Name: Lauren Kaba  MRN: [id number]  Admission Date: 8/23/2023  Hospital Length of Stay: 1 days  Attending Provider: Melo Mehta Jr., *  Primary Care Provider: Roni Mckeon II, MD        Inpatient consult to Orthopedics  Consult performed by: Keith Goodwin DO  Consult ordered by: Janak Jerez FNP        Subjective:         Chief Complaint: No chief complaint on file.       HPI:  Patient is a 63-year-old left-hand dominant female with AFib fell onto her left wrist has a left open distal radius fracture severely comminuted of the articular surface.  Dull achy pain in the left wrist no numbness or tingling.  Worse with range of motion better at rest.    Past Medical History:   Diagnosis Date    Personal history of colonic polyps 09/27/2016       Past Surgical History:   Procedure Laterality Date    BREAST SURGERY  11/2013    Reduction    CARDIAC ELECTROPHYSIOLOGY STUDY AND ABLATION      CARDIOVERSION      x 3    COLONOSCOPY W/ POLYPECTOMY  09/27/2016    JOINT REPLACEMENT  5/2013    TONSILLECTOMY  1989       Review of patient's allergies indicates:   Allergen Reactions    Cipro [ciprofloxacin hcl] Hives       Current Facility-Administered Medications   Medication    acetaminophen tablet 650 mg    ascorbic acid (vitamin C) tablet 500 mg    ceFAZolin (ANCEF) 1 gram injection    cefazolin (ANCEF) 2 gram in dextrose 5% 50 mL IVPB (premix)    celecoxib capsule 200 mg    cetirizine tablet 10 mg    dextrose 10% bolus 125 mL 125 mL    dextrose 10% bolus 250 mL 250 mL    docusate sodium capsule 100 mg    enoxaparin injection 40 mg    fentaNYL (SUBLIMAZE) 50 mcg/mL injection    fluticasone propionate 50 mcg/actuation nasal spray 50 mcg    gabapentin capsule 300 mg    glucagon (human recombinant) injection 1 mg    glucose chewable tablet 16 g    glucose chewable tablet 24 g    insulin aspart U-100 injection 0-5  "Units    lactated ringers infusion    magnesium hydroxide 400 mg/5 ml suspension 2,400 mg    melatonin tablet 6 mg    methocarbamoL tablet 500 mg    metoprolol injection 5 mg    multivitamin tablet    omega 3-dha-epa-fish oil 1,000 mg (120 mg-180 mg) Cap 1 capsule    ondansetron 4 mg/2 mL injection    oxyCODONE immediate release tablet 5 mg    oxyCODONE immediate release tablet Tab 10 mg    polyethylene glycol packet 17 g    propofol (DIPRIVAN) 10 mg/mL IVP injection    sotaloL tablet 120 mg    vitamin D 1000 units tablet 1,000 Units     Family History       Problem Relation (Age of Onset)    Arthritis Father    Cancer Mother    Diabetes Mother    Heart disease Father          Tobacco Use    Smoking status: Never    Smokeless tobacco: Never   Substance and Sexual Activity    Alcohol use: Never    Drug use: Never    Sexual activity: Not Currently       ROS:  Constitutional: Denies fever chills  Eyes: No change in vision  ENT: No ringing or current infections  CV: No chest pain  Resp: No labored breathing  MSK: Pain evident at site of injury located in HPI,   Integ: No signs of abrasions or lacerations  Neuro: No numbness or tingling  Lymphatic: No swelling outside the area of injury   Objective:     Vital Signs (Most Recent):  Temp: 98.6 °F (37 °C) (08/24/23 0429)  Pulse: (!) 125 (08/24/23 0429)  Resp: 16 (08/24/23 0429)  BP: 119/77 (08/24/23 0429)  SpO2: (!) 94 % (08/24/23 0429) Vital Signs (24h Range):  Temp:  [98.1 °F (36.7 °C)-98.7 °F (37.1 °C)] 98.6 °F (37 °C)  Pulse:  [117-142] 125  Resp:  [13-20] 16  SpO2:  [94 %-100 %] 94 %  BP: ()/(60-94) 119/77     Weight: 108.9 kg (240 lb)  Height: 5' 4.02" (162.6 cm)  Body mass index is 41.18 kg/m².      Intake/Output Summary (Last 24 hours) at 8/24/2023 0713  Last data filed at 8/24/2023 0600  Gross per 24 hour   Intake 60 ml   Output 300 ml   Net -240 ml       Ortho/SPM Exam  General the patient is alert and oriented x3 no acute distress nontoxic-appearing " appropriate affect.    Constitutional: Vital signs are examined and stable.  Resp: No signs of labored breathing    LUE: --Skin: No signs of new abrasions or lacerations, no scars           -MSK: STR 5/5 AIN/PIN/Median/Radial/Ulnar motor           -Neuro:  Sensation intact to light touch C5-T1 dermatomes           -Lymphatic: No signs of lymphadenopathy, No signs of swelling,           -CV:Capillary refill is less than 2 seconds. Radial and ulnar pulses 2/4. Compartments Soft and compressible                 Significant Labs:   Recent Lab Results         08/24/23  0417   08/23/23  2113        Albumin/Globulin Ratio 1.4   1.5       Albumin 3.6   3.9       Alkaline Phosphatase 82   96       ALT 57   64       aPTT   20.5  Comment: For Minimal Heparin Infusion, the goal aPTT 64-85 seconds corresponds to an anti-Xa of 0.3-0.5.    For Low Intensity and High Intensity Heparin, the goal aPTT  seconds corresponds to an anti-Xa of 0.3-0.7       AST 47   52       Baso #   0.05       Basophil %   0.5       BILIRUBIN TOTAL 1.0   1.0       BUN 15.9   21.1       Calcium 9.0   9.6       Chloride 104   105       CO2 25   24       Creatinine 0.63   0.69       CRP 1.80         eGFR >60   >60       Eos #   0.25       Eosinophil %   2.5       Globulin, Total 2.6   2.6       Glucose 167   156       Hematocrit   38.1       Hemoglobin   13.0       Immature Grans (Abs)   0.03       Immature Granulocytes   0.3       INR   1.0       Lymph #   2.10       LYMPH %   21.2       Magnesium 1.40         MCH   29.2       MCHC   34.1       MCV   85.6       Mono #   0.85       Mono %   8.6       MPV   9.1       Neut #   6.61       Neut %   66.9       nRBC   0.0       Phosphorus 3.5         Platelets   168       Potassium 3.7   3.5       Prealbumin 23.6         PROTEIN TOTAL 6.2   6.5       Protime   13.1       RBC   4.45       RDW   13.4       Sodium 138   141       WBC   9.89             All pertinent labs within the past 24 hours have been  reviewed.  Recent Lab Results         08/24/23  0417   08/23/23 2113        Albumin/Globulin Ratio 1.4   1.5       Albumin 3.6   3.9       Alkaline Phosphatase 82   96       ALT 57   64       aPTT   20.5  Comment: For Minimal Heparin Infusion, the goal aPTT 64-85 seconds corresponds to an anti-Xa of 0.3-0.5.    For Low Intensity and High Intensity Heparin, the goal aPTT  seconds corresponds to an anti-Xa of 0.3-0.7       AST 47   52       Baso #   0.05       Basophil %   0.5       BILIRUBIN TOTAL 1.0   1.0       BUN 15.9   21.1       Calcium 9.0   9.6       Chloride 104   105       CO2 25   24       Creatinine 0.63   0.69       CRP 1.80         eGFR >60   >60       Eos #   0.25       Eosinophil %   2.5       Globulin, Total 2.6   2.6       Glucose 167   156       Hematocrit   38.1       Hemoglobin   13.0       Immature Grans (Abs)   0.03       Immature Granulocytes   0.3       INR   1.0       Lymph #   2.10       LYMPH %   21.2       Magnesium 1.40         MCH   29.2       MCHC   34.1       MCV   85.6       Mono #   0.85       Mono %   8.6       MPV   9.1       Neut #   6.61       Neut %   66.9       nRBC   0.0       Phosphorus 3.5         Platelets   168       Potassium 3.7   3.5       Prealbumin 23.6         PROTEIN TOTAL 6.2   6.5       Protime   13.1       RBC   4.45       RDW   13.4       Sodium 138   141       WBC   9.89                Significant Imaging: I have reviewed all pertinent imaging results/findings.  X-Ray Chest AP Portable    Result Date: 8/23/2023  EXAMINATION: XR CHEST AP PORTABLE CLINICAL HISTORY: Injury, unspecified, initial encounter TECHNIQUE: Single frontal view of the chest was performed. COMPARISON: None FINDINGS: No infiltrates are seen.  Heart is enlarged.  Costophrenic angles are clear.  There is vascular calcification noted.     Cardiomegaly, no acute disease is seen Electronically signed by: Gilberto Claudio MD Date:    08/23/2023 Time:    22:12    X-Ray Pelvis Routine  AP    Result Date: 8/23/2023  EXAMINATION: XR PELVIS ROUTINE AP CLINICAL HISTORY: trauma; TECHNIQUE: AP view of the pelvis was performed. COMPARISON: 09/19/2017 FINDINGS: There are no acute fractures seen.  The film is under penetrated.  There is no dislocation.     Limited study, fracture is not demonstrated Electronically signed by: Gilberto Claudio MD Date:    08/23/2023 Time:    22:12    X-Ray Wrist 2 View Left    Result Date: 8/23/2023  EXAMINATION: Left wrist two views CLINICAL HISTORY: Postreduction COMPARISON: Same day FINDINGS: Overlying cast noted.  There has been interval reduction with improved alignment of comminuted distal radius and ulnar fractures.     Interval improved alignment of distal radius and ulnar fractures. Electronically signed by: David Samson MD Date:    08/23/2023 Time:    21:40    X-Ray Wrist Complete Left    Result Date: 8/23/2023  EXAMINATION: Left wrist three views CLINICAL HISTORY: Trauma, injury COMPARISON: None FINDINGS: There is comminuted, displaced, impacted intra-articular fracture of the distal radius.  There is comminuted angulated and displaced fracture of the distal ulna.     Fracture of the distal radius and ulna Electronically signed by: David Samson MD Date:    08/23/2023 Time:    21:36    CT Head Without Contrast    Result Date: 8/23/2023  START OF REPORT: TECHNIQUE: CT OF THE HEAD WAS PERFORMED WITHOUT INTRAVENOUS CONTRAST WITH AXIAL AS WELL AS CORONAL AND SAGITTAL IMAGES. COMPARISON: NONE. DOSAGE INFORMATION: AUTOMATED EXPOSURE CONTROL WAS UTILIZED. CLINICAL HISTORY: TRAUMA LEVEL 2, FALL ON THINNERS, FACIAL TRAUMA. Findings: Hemorrhage: No acute intracranial hemorrhage is seen. CSF spaces: The ventricles sulci and basal cisterns are within normal limits. Brain parenchyma: Unremarkable with preservation of the grey white junction throughout. Cerebellum: Unremarkable. Vascular: Mild atheromatous calcification of the intracranial arteries is seen. Sella and skull  base: The sella appears to be within normal limits for age. Intracranial calcifications: Incidental note is made of bilateral choroid plexus calcification. Incidental note is made of some pineal region calcification. Calvarium: No acute linear or depressed skull fracture is seen. Maxillofacial Structures: Maxillofacial findings discussed separately in the maxillofacial CT report. Impression: 1. No acute intracranial traumatic injury identified. Details and other findings as noted above.     CT Wrist Without Contrast Left    Result Date: 8/23/2023  START OF REPORT: TECHNIQUE: CT OF THE UPPER EXTREMITY WAS PERFORMED WITHOUT INTRAVENOUS CONTRAST. COMPARISON: NONE. CLINICAL HISTORY: TRAUMA LEVEL 2, FALL ON THINNERS, FACIAL TRAUMA, LEFT WRIST TRAUMA. Findings: Wrist: Comminuted fracture of the distal radius and ulna is seen. The distal radial fracture shows slight impaction with intra-articular extension. Chip fracture of the ulnar styloid is also demonstrated. An incomplete fracture is noted in the lunate with a horizontal fracture line noted ventrally. Subcortical cysts are also noted in the lunate bone. Soft tissue swelling is noted in the wrist. The rest of the osseous structures are intact. Widened radiocarpal joint implies ligamentous injury. Impression: 1. Comminuted fracture of the distal radius and ulna is seen. The distal radial fracture shows slight impaction with intra-articular extension. Chip fracture of the ulnar styloid is also demonstrated. An incomplete fracture is noted in the lunate with a horizontal fracture line noted ventrally. Widened radiocarpal joint implies ligamentous injury. 2. Other details and findings as discussed above.     CT Maxillofacial Without Contrast    Result Date: 8/23/2023  START OF REPORT: TECHNIQUE: MAXILLOFACIAL CT WAS PERFORMED WITH INTRAVENOUS CONTRAST WITH AXIAL AS WELL AS SAGITTAL AND CORONAL IMAGES. COMPARISON: NONE. CLINICAL HISTORY: TRAUMA LEVEL 2, FALL ON THINNERS,  FACIAL TRAUMA. Findings: Facial soft tissues: Unremarkable. Orbital soft tissues: The orbital soft tissues appear unremarkable. Bones: Orbital bony structures: The bilateral orbital bony structures are intact with no orbital fracture identified. Mandible: The mandible appears unremarkable. Maxilla: The maxilla appears unremarkable. Zygoma: The zygomatic arches are intact. TMJ: The mandibular condyles appear normally placed with respect to the mandibular fossa. Nasal Bones: No displaced nasal bone fracture is seen. Mild rightward deviation of the nasal septum is seen. Skull: No acute linear or depressed fracture is identified in the visualized skull. Paranasal sinuses: The visualized paranasal sinuses appear clear with no significant mucoperiosteal thickening or air fluid levels identified. Mastoid air cells: The visualized mastoid air cells appear clear. Brain: Intracranial findings discussed separately. Impression: 1. No acute maxillofacial fracture identified. Details and findings as noted above.        Assessment/Plan:     Active Diagnoses:    Diagnosis Date Noted POA    Open fracture of left distal radius [S52.502B] 08/24/2023 Unknown      Problems Resolved During this Admission:         Patient has a left distal radius severely comminuted the articular surface greater than 5 fragments.  Patient also has a distal ulna shaft fracture with a small distal piece and a poke hole open injury.  Patient is left-hand dominant.  She understands the risks and benefits of the procedure.  She is at high risk of infection due to the open injury.  We will take her urgently to the OR for washout.    I explained that surgery and the nature of their condition are not without risks. These include, but are not limited to, bleeding, infection, neurovascular compromise, malunion, nonunion, hardware complications, wound complications, scarring, cosmetic defects, need for later and/or repeated surgeries, avascular necrosis, bone death  due to initial trauma, pain, loss of ROM, loss of function, PTOA, deformity, stance/gait and/or functional abnormalities, thromboembolic complications, compartment syndrome, loss of limb, loss of life, anesthetic complications, and other imponderables. I explained that these can occur despite the adequacy of treatments rendered, and that their risks are heightened given the nature of their condition. They verbalized understanding. They would like to continue with surgery at this time. If appropriate family was involved with surgical discussion.             This note/OR report was created with the assistance of  voice recognition software or phone  dictation.  There may be transcription errors as a result of using this technology however minimal. Effort has been made to assure accuracy of transcription but any obvious errors or omissions should be clarified with the author of the document.       Keith Goodwin, DO   Orthopedic Trauma Surgery  [facility] - 8th Floor Med Surg

## 2023-08-24 NOTE — PROGRESS NOTES
Left distal radius/ulna fracture likely poke hole open. We will place her on the OR schedule tomorrow.    Buddy

## 2023-08-24 NOTE — ANESTHESIA PROCEDURE NOTES
Intubation    Date/Time: 8/24/2023 12:38 PM    Performed by: Fred Saavedra CRNA  Authorized by: Brenda Reyes MD    Intubation:     Induction:  Intravenous    Intubated:  Postinduction    Mask Ventilation:  Easy mask    Attempts:  1    Attempted By:  CRNA    Method of Intubation:  Direct    Blade:  Arriaza 2    Laryngeal View Grade: Grade I - full view of cords      Difficult Airway Encountered?: No      Complications:  None    Airway Device:  Oral endotracheal tube    Airway Device Size:  7.0    Style/Cuff Inflation:  Cuffed (inflated to minimal occlusive pressure)    Inflation Amount (mL):  9    Tube secured:  22    Secured at:  The lips    Placement Verified By:  Capnometry    Complicating Factors:  None    Findings Post-Intubation:  BS equal bilateral and atraumatic/condition of teeth unchanged

## 2023-08-24 NOTE — ANESTHESIA PREPROCEDURE EVALUATION
08/24/2023  Lauren Kaba is a 63 y.o., female. tripped over the curb while leaving work.  Fell forward and hit the left outstretched wrist and face.  Pain primarily to the left wrist.  Received 200 mcg of fentanyl EN route by EMS.  Has an obvious deformity with a very small puncture wound to the ulnar aspect of the left wrist.  Hemostatic.  Tachycardic in AFib initially which became tachycardia and sinus rhythm after pain medications.Resumed Afib w/RVR in 140's all night.  Now on Cardizem    Has had ablation x 2    Pre-op Assessment    I have reviewed the Patient Summary Reports.     I have reviewed the Nursing Notes. I have reviewed the NPO Status.   I have reviewed the Medications.     Review of Systems      Physical Exam  General: Well nourished, Cooperative, Alert and Oriented    Airway:  Mallampati: II   Mouth Opening: Normal  TM Distance: Normal  Tongue: Normal  Neck ROM: Normal ROM    Dental:  Intact        Anesthesia Plan  Type of Anesthesia, risks & benefits discussed:    Anesthesia Type: Gen ETT  Intra-op Monitoring Plan: Standard ASA Monitors  Post Op Pain Control Plan: multimodal analgesia  Induction:  IV  Airway Plan: Direct, Post-Induction  Informed Consent: Informed consent signed with the Patient and all parties understand the risks and agree with anesthesia plan.  All questions answered. Patient consented to blood products? Yes  ASA Score: 2  Day of Surgery Review of History & Physical: H&P Update referred to the surgeon/provider.    Ready For Surgery From Anesthesia Perspective.     .

## 2023-08-24 NOTE — PROGRESS NOTES
Trauma Surgery   Progress Note  Admit Date: 8/23/2023  HD#1  POD#Day of Surgery    Subjective:   Interval history:  Pt with L wrist fx waiting to go to surgery today with ortho. She is resting comfortably without complaints.     Home Meds:   Current Outpatient Medications   Medication Instructions    apixaban (ELIQUIS) 5 mg, Oral, 2 times daily    ascorbic acid (vitamin C) (VITAMIN C) 500 mg, Oral    blood sugar diagnostic (ACCU-CHEK GUIDE TEST STRIPS) Strp USE TO TEST SUGARS FOUR TIMES A DAY    celecoxib (CELEBREX) 200 MG capsule TAKE ONE CAPSULE BY MOUTH EVERY DAY    fexofenadine (ALLEGRA) 180 mg, Oral    fluticasone propionate (FLONASE) 50 mcg/actuation nasal spray 1 spray, Nasal    glucosamine HCl (GLUCOSAMINE, BULK, MISC) No dose, route, or frequency recorded.    metFORMIN (GLUCOPHAGE-XR) 500 MG ER 24hr tablet TAKE ONE TABLET BY MOUTH TWICE A DAY    MULTIVITAMIN ORAL 1 tablet, Oral, Daily    omega 3-dha-epa-fish oil 1,200 (144-216) mg Cap 1 capsule, Oral, Daily    sotaloL (BETAPACE) 120 mg, Oral, 2 times daily    TRULICITY 1.5 mg, Subcutaneous, Every 7 days    vitamin D (VITAMIN D3) 1,000 Units, Oral      Scheduled Meds:   acetaminophen  650 mg Oral Q4H    ascorbic acid (vitamin C)  500 mg Oral Daily    ceFAZolin (ANCEF) IVPB  2 g Intravenous Q8H    celecoxib  200 mg Oral Daily    cetirizine  10 mg Oral Daily    digoxin  250 mcg Intravenous Q6H    docusate sodium  100 mg Oral BID    enoxparin  40 mg Subcutaneous Q12H    fluticasone propionate  1 spray Each Nostril Daily    gabapentin  300 mg Oral TID    magnesium sulfate IVPB  4 g Intravenous Once    methocarbamoL  500 mg Oral Q8H    multivitamin  1 tablet Oral Daily    omega 3-dha-epa-fish oil  1 capsule Oral Daily    polyethylene glycol  17 g Oral BID    sotaloL  120 mg Oral BID    vitamin D  1,000 Units Oral Daily     Continuous Infusions:   dilTIAZem 10 mg/hr (08/24/23 1204)    lactated ringers 100 mL/hr at 08/23/23 1605     PRN Meds:dextrose 10%,  "dextrose 10%, glucagon (human recombinant), glucose, glucose, insulin aspart U-100, magnesium hydroxide 400 mg/5 ml, melatonin, metoprolol, ondansetron, oxyCODONE, oxyCODONE     Objective:     VITAL SIGNS: 24 HR MIN & MAX LAST   Temp  Min: 97.7 °F (36.5 °C)  Max: 98.7 °F (37.1 °C)  97.7 °F (36.5 °C)   BP  Min: 95/65  Max: 160/85  135/86    Pulse  Min: 117  Max: 142  (!) 132    Resp  Min: 13  Max: 20  18    SpO2  Min: 94 %  Max: 100 %  96 %      HT: 5' 4.02" (162.6 cm)  WT: 108.9 kg (240 lb)  BMI: 41.2     Intake/output:  Intake/Output - Last 3 Shifts         08/22 0700 08/23 0659 08/23 0700 08/24 0659 08/24 0700 08/25 0659    P.O.  60     Total Intake(mL/kg)  60 (0.6)     Urine (mL/kg/hr)  300     Stool  0     Total Output  300     Net  -240            Urine Occurrence   1 x    Stool Occurrence  0 x 0 x    Emesis Occurrence   0 x            Intake/Output Summary (Last 24 hours) at 8/24/2023 1231  Last data filed at 8/24/2023 0600  Gross per 24 hour   Intake 60 ml   Output 300 ml   Net -240 ml         Lines/drains/airway:       Peripheral IV - Single Lumen 08/23/23 2009 20 G Anterior;Right Hand (Active)   Site Assessment Clean 08/24/23 1221   Extremity Assessment Distal to IV No swelling;No redness;No abnormal discoloration;No warmth 08/24/23 1221   Line Status Flushed 08/24/23 1221   Dressing Status Clean;Dry;Intact 08/24/23 1221   Dressing Intervention Integrity maintained 08/24/23 1221   Number of days: 0            Peripheral IV - Single Lumen 08/23/23 2009 20 G Anterior;Right Hand (Active)   Site Assessment Clean;Dry;Intact;No redness;No swelling 08/24/23 1221   Extremity Assessment Distal to IV No swelling;No redness;No abnormal discoloration;No warmth 08/24/23 1221   Line Status Flushed 08/24/23 1221   Dressing Status Clean;Dry;Intact 08/24/23 1221   Dressing Intervention Integrity maintained 08/24/23 1221   Number of days: 0       Physical examination:  Gen: NAD, AAOx3, answering questions " "appropriately  HEENT: normocephalic, atraumatic   CV: a-flutter  Resp: NWOB on RA  Abd: S/NT/ND  Msk: moving all extremities spontaneously and purposefully; LUE in splint  Neuro: CN II-XII grossly intact  Skin/wounds: no abrasions visualized     Labs:  Renal:  Recent Labs     08/23/23 2113 08/24/23  0417   BUN 21.1* 15.9   CREATININE 0.69 0.63     No results for input(s): "LACTIC" in the last 72 hours.  FEN/GI:  Recent Labs     08/23/23 2113 08/24/23  0417    138   K 3.5 3.7   CO2 24 25   CALCIUM 9.6 9.0   MG  --  1.40*   PHOS  --  3.5   ALBUMIN 3.9 3.6   BILITOT 1.0 1.0   AST 52* 47*   ALKPHOS 96 82   ALT 64* 57*     Heme:  Recent Labs     08/23/23 2113   HGB 13.0   HCT 38.1      PTT 20.5*   INR 1.0     ID:  Recent Labs     08/23/23 2113   WBC 9.89     CBG:  Recent Labs     08/23/23 2113 08/24/23  0417   GLUCOSE 156* 167*      Recent Labs     08/24/23  0607   POCTGLUCOSE 168*      Cardiovascular:  No results for input(s): "TROPONINI", "CKTOTAL", "CKMB", "BNP" in the last 168 hours.  I have reviewed all pertinent lab results within the past 24 hours.    Imaging:  X-Ray Pelvis Routine AP   Final Result      Limited study, fracture is not demonstrated         Electronically signed by: Gilberto Claudio MD   Date:    08/23/2023   Time:    22:12      X-Ray Chest AP Portable   Final Result      Cardiomegaly, no acute disease is seen         Electronically signed by: Gilberto Claudio MD   Date:    08/23/2023   Time:    22:12      X-Ray Wrist 2 View Left   Final Result      Interval improved alignment of distal radius and ulnar fractures.         Electronically signed by: David Samosn MD   Date:    08/23/2023   Time:    21:40      X-Ray Wrist Complete Left   Final Result      Fracture of the distal radius and ulna         Electronically signed by: David Samson MD   Date:    08/23/2023   Time:    21:36      CT Head Without Contrast   Final Result      No acute intracranial abnormality.      No significant " change from the [facility] interpretation.         Electronically signed by: Inez Arizmendi   Date:    08/24/2023   Time:    10:18      CT Wrist Without Contrast Left   Final Result      A highly comminuted intra-articular fracture is present to the distal radius with 100% dorsal displacement of a dorsal fracture fragment, loss of volar tilt, and gross instability of the distal radioulnar joint.      A comminuted intra-articular fracture is present to the distal ulna and ulnar styloid.      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         Electronically signed by: Thomas Vang   Date:    08/24/2023   Time:    11:00      CT Maxillofacial Without Contrast   Final Result      No acute fracture identified.      No significant change from the [facility] interpretation.         Electronically signed by: Inez Arizmendi   Date:    08/24/2023   Time:    10:20      CT 3D RECON WITH INDEPENDENT WS    (Results Pending)   X-Ray Chest 1 View    (Results Pending)   SURG FL Surgery Fluoro Usage    (Results Pending)      I have reviewed all pertinent imaging results/findings within the past 24 hours.    Micro/Path/Other:  Microbiology Results (last 7 days)       ** No results found for the last 168 hours. **           Specimen (168h ago, onward)      None             Assessment & Plan:   62 y/o female with hx of afib on Eliquis presented after trip and fall resulting in L distal radius and ulnar fractures with a punctuate wound on the ulnar aspect. CT head neg. Pt admitted to trauma with consult to ortho.     - Daily labs  - MM pain control  - IS  - SSI  - PT/OT when able  - Lovenox 40mg   - Ppx abx  - NWB LUE  - Cardiology consulted for afib w/ RVR              - Resumed sotalol. Started IV digoxin and cardizem drip.               - Replete Mg & K               - Order echo once HR is controlled  - Cleared by cardiology for sx. OR today with ortho.   - Limit Zofran with prolonged  Akash Mcarthur DO  [facility]-

## 2023-08-24 NOTE — OP NOTE
OPERATIVE REPORT    Patient: Lauren Kaba   : 1960    MRN: [id number]  Date: 2023      Surgeon:Keith Goodwin DO  Assistant: Matt Santamaria was essential, part of the procedure including deep hardware placement and deep closure.  No senior assistant was availible  Preoperative Diagnosis:  Left open distal radius fracture, Left distal ulna shaft fracture  Postoperative Diagnosis: Same  Procedure:    Open reduction and internal fixation of 4+ part intra-articular left distal radius fracture 74637  Open reduction internal fixation left distal ulna fracture 16877   Excisional debridement open fracture  left distal radius-CPT 10227  Anesthesiologist: Brenda Reyes MD  OR Staff: Circulator: Tracey Montalvo RN  Scrub Person: Teresa Schofield  X-Ray Technologist: Zach Epps RT  Implants:   Implant Name Type Inv. Item Serial No.  Lot No. LRB No. Used Action   PLATE GEMINUS DORSAL SPANNING - [id number]  PLATE GEMINUS DORSAL SPANNING  [employer]  Left 1 Implanted   PROTEAN FRAGMENT PLATE, DISTAL ULNA    [employer]  Left 1 Implanted   THREADED PEG, LOCKING, 2.3MM X 14MM    [employer]  Left 1 Implanted   THREADED PEG, LOCKING, 2.3MM X 16MM    [employer]  Left 3 Implanted   THREADED PEG, NON-LOCKING, 2.7MM X 10MM    [employer]  Left 1 Implanted   THREADED PEG, NON-LOCKING, 2.7MM X 10MM    [employer]  Left 1 Explanted   THREADED PEG, NON-LOCKING, 2.7MM X 12MM    [employer]  Left 1 Implanted   PEG FIX GEMINUS NS 18X2.7MM - [id number]  PEG FIX GEMINUS NS 18X2.7MM  [employer]  Left 1 Implanted   K-WIRE, STANDARD TIP, 1.6MM X 127MM    [employer]  Left 2 Implanted and Explanted   SCREW, MULTI-THREAD, LOCKING, 3.0MM X 8MM, TI    [employer]  Left 1 Implanted   SCREW BONE LOCK TI 10X3MM - [id number]  SCREW BONE LOCK TI 10X3MM  [employer]  Left 2 Implanted    SCREW BONE TI LOCK 12X3MM - [id number]  SCREW BONE TI LOCK 12X3MM  [employer]  Left 2 Implanted   SCREW BONE COMP TI 8X3MM - [id number]  SCREW BONE COMP TI 8X3MM  [employer]  Left 1 Implanted   SCREW BONE COMPR 14X3MM - [id number]  SCREW BONE COMPR 14X3MM  [employer]  Left 1 Implanted     EBL: 50cc  Complications: None  Disposition: To PACU, stable    Indications: Lauren Kaba is a 63 y.o. female presenting with the aforementioned injuries. The procedure is indicated to obtain and maintain stability of the wrist and allow early ROM.  The patient is awake and alert. After thorough discussion of the risks, benefits, expected outcomes, and alternatives to surgical intervention, the patient agreed to proceed with surgical treatment.  Specific risks discussed included, but were not limited to: superficial or deep infection, wound healing complications, DVT/PE, significant bleeding requiring transfusion, damage to named anatomic structures in the immediate area including named neurovascular structures, carpal tunnel syndrome, implant failure, and general risks of anesthesia.  The patient voiced understanding and written as well as verbal consent was obtained by myself prior to the procedure. They understand that they may have the plate removed after fracture healing     Patient has severe comminution distal radius and fracture of the ulnar shaft and ulnar styloid.  Patient also has a poke hole open injury which will take urgently to the OR as they have been cleared by cardio due to  Atrial dysfunction    Procedure Note:  The patient was brought back to the OR and placed supine on the OR table. After successful induction of anesthesia by anesthesia staff, the patient was positioned in the supine position and all bony prominences were padded appropriately.  The surgical field was then provisionally cleansed and then prepped and draped in the usual sterile fashion.    At this  time a time-out was performed, with the correct patient, site, and procedure identified.  The universal time out as well as sign your site protocols were followed.  Preoperative antibiotics were verified as administered.      Patient had a 3 mm poke hole on the volar wrist.  Extended this incision proximally and distally and then copiously irrigated the wound completed excisional debridement of open fracture 15 blade and a curette.  We removed the soft tissue and necrotic muscle.      Tension my attention is drawn to the left distal ulna use the FCU ECU approach longitudinal incision blunt dissection down.  Patient has a thick adipose tissue layer.  I then reduced the ulna and placed the distal ulnar plate completing open reduction internal fixation of the ulnar shaft.    I first obtained a traction view of the wrist and was able to evaluate the effect of inline traction on the reduction.  I then made an incision along the dorsal radius and another distally about the 2rd metacarpal.  Attention to hemostasis was paid using electrocautery.  I carefully dissected down to the radius and 2nd metacarpal here, and was able to translate the extensor tendons out of the way to allow for careful submuscular and subtendinous placement of a long plate dorsally.  This was affixed proximally as well as distally after distraction was applied.    The incisions were then irrigated using copious sterile saline and then closed in layered fashion.  The surgical sites were infiltrated using local anesthetic, and the arm was sterilely cleansed and dressed.    The patient was then subsequently extubated and transferred to to PACU in a stable condition.    All sponge and needle counts were correct at the end of the case.  I was present and participated in all aspects of the procedure.    Prognosis:  We will keep the patient NWB for now in a short arm splint .  The patient can do ROM of the fingers and elbow as tolerated.   Patient will need  bridge plate removal in 3 months.      This note/OR report was created with the assistance of  voice recognition software or phone  dictation.  There may be transcription errors as a result of using this technology however minimal. Effort has been made to assure accuracy of transcription but any obvious errors or omissions should be clarified with the author of the document.       Keith Goodwin, DO  Orthopedic Trauma Surgery

## 2023-08-24 NOTE — ANESTHESIA POSTPROCEDURE EVALUATION
Anesthesia Post Evaluation    Patient: Lauren Kaba    Procedure(s) Performed: Procedure(s) (LRB):  ORIF, FRACTURE, RADIUS (Left)    Final Anesthesia Type: general      Patient location during evaluation: PACU  Patient participation: Yes- Able to Participate  Level of consciousness: awake and alert  Post-procedure vital signs: reviewed and stable  Pain management: adequate  Airway patency: patent      Anesthetic complications: no      Cardiovascular status: hemodynamically stable  Respiratory status: unassisted  Hydration status: euvolemic  Follow-up not needed.          Vitals Value Taken Time   /98 08/24/23 1531   Temp 36.7 °C (98 °F) 08/24/23 1450   Pulse 120 08/24/23 1541   Resp 13 08/24/23 1743   SpO2 94 % 08/24/23 1531   Vitals shown include unvalidated device data.      Event Time   Out of Recovery 08/24/2023 15:30:07         Pain/Margy Score: Pain Rating Prior to Med Admin: 8 (8/24/2023  5:43 PM)  Pain Rating Post Med Admin: 3 (8/24/2023  6:36 AM)  Margy Score: 9 (8/24/2023  3:30 PM)

## 2023-08-24 NOTE — TERTIARY TRAUMA SURVEY NOTE
TERTIARY TRAUMA SURVEY (TTS)    List Injuries Identified to Date:   1. Left distal radius and ulnar fractures   2. Incomplete lunate fracture   3. L wrist ligamentous injury implied by widened radiocarpal joint     List Operations and Procedures:   ORIF L radius and ulna     Past Surgical History:   Procedure Laterality Date    BREAST SURGERY  11/2013    Reduction    CARDIAC ELECTROPHYSIOLOGY STUDY AND ABLATION      CARDIOVERSION      x 3    COLONOSCOPY W/ POLYPECTOMY  09/27/2016    JOINT REPLACEMENT  5/2013    TONSILLECTOMY  1989     Incidental Findings:   Cardiomegaly without acute disease       Past Medical History:   1. A-fib with RVR  2. DM    Active Ambulatory Problems     Diagnosis Date Noted    Morbid (severe) obesity due to excess calories 04/05/2023     Resolved Ambulatory Problems     Diagnosis Date Noted    No Resolved Ambulatory Problems     Past Medical History:   Diagnosis Date    Personal history of colonic polyps 09/27/2016     Past Medical History:   Diagnosis Date    Personal history of colonic polyps 09/27/2016       Tertiary Physical Exam:     Physical Exam  Vitals reviewed.   Constitutional:       Appearance: She is not ill-appearing.   HENT:      Head: Normocephalic and atraumatic.      Right Ear: External ear normal.      Left Ear: External ear normal.      Nose: Nose normal.      Mouth/Throat:      Pharynx: Oropharynx is clear.   Eyes:      Extraocular Movements: Extraocular movements intact.      Conjunctiva/sclera: Conjunctivae normal.   Cardiovascular:      Rate and Rhythm: Tachycardia present.      Pulses: Normal pulses.   Pulmonary:      Effort: Pulmonary effort is normal.   Abdominal:      General: There is no distension.      Palpations: Abdomen is soft.      Tenderness: There is no abdominal tenderness.   Musculoskeletal:      Cervical back: Normal range of motion. No tenderness.      Right lower leg: No edema.      Left lower leg: No edema.      Comments: LUE with short arm  splint in place; sensation intact distally; Full ROM at elbow    Skin:     General: Skin is warm and dry.      Comments: hematoma LUE   Neurological:      General: No focal deficit present.      Mental Status: She is alert.         Imaging Review:     Imaging Results              X-Ray Pelvis Routine AP (Final result)  Result time 08/23/23 22:12:13      Final result by Gilberto Claudio MD (08/23/23 22:12:13)                   Impression:      Limited study, fracture is not demonstrated      Electronically signed by: Gilberto Claudio MD  Date:    08/23/2023  Time:    22:12               Narrative:    EXAMINATION:  XR PELVIS ROUTINE AP    CLINICAL HISTORY:  trauma;    TECHNIQUE:  AP view of the pelvis was performed.    COMPARISON:  09/19/2017    FINDINGS:  There are no acute fractures seen.  The film is under penetrated.  There is no dislocation.                                       X-Ray Chest AP Portable (Final result)  Result time 08/23/23 22:12:46      Final result by Gilberto Claudio MD (08/23/23 22:12:46)                   Impression:      Cardiomegaly, no acute disease is seen      Electronically signed by: Gilberto Claudio MD  Date:    08/23/2023  Time:    22:12               Narrative:    EXAMINATION:  XR CHEST AP PORTABLE    CLINICAL HISTORY:  Injury, unspecified, initial encounter    TECHNIQUE:  Single frontal view of the chest was performed.    COMPARISON:  None    FINDINGS:  No infiltrates are seen.  Heart is enlarged.  Costophrenic angles are clear.  There is vascular calcification noted.                                       X-Ray Wrist 2 View Left (Final result)  Result time 08/23/23 21:40:40      Final result by David Samson MD (08/23/23 21:40:40)                   Impression:      Interval improved alignment of distal radius and ulnar fractures.      Electronically signed by: David Samson MD  Date:    08/23/2023  Time:    21:40               Narrative:    EXAMINATION:  Left wrist two  views    CLINICAL HISTORY:  Postreduction    COMPARISON:  Same day    FINDINGS:  Overlying cast noted.  There has been interval reduction with improved alignment of comminuted distal radius and ulnar fractures.                                       CT 3D RECON WITH INDEPENDENT WS (Final result)  Result time 08/24/23 13:22:20      Final result by Maine Flores MD (08/24/23 13:22:20)                   Narrative:    EXAMINATION:  CT 3D RECON WITH INDEPENDENT WS    CLINICAL HISTORY:  FRACTURE;    TECHNIQUE:  3D volumetric rotating reconstructions of the bony wrist were created using the axial data set from CT wrist 08/23/2023.    COMPARISON:  None    FINDINGS:  See report associated with accession number [id number] for findings.      Electronically signed by: Maine Flores  Date:    08/24/2023  Time:    13:22                                     X-Ray Wrist Complete Left (Final result)  Result time 08/23/23 21:36:34      Final result by David Samson MD (08/23/23 21:36:34)                   Impression:      Fracture of the distal radius and ulna      Electronically signed by: David Samson MD  Date:    08/23/2023  Time:    21:36               Narrative:    EXAMINATION:  Left wrist three views    CLINICAL HISTORY:  Trauma, injury    COMPARISON:  None    FINDINGS:  There is comminuted, displaced, impacted intra-articular fracture of the distal radius.  There is comminuted angulated and displaced fracture of the distal ulna.                                       CT Head Without Contrast (Final result)  Result time 08/24/23 10:18:24      Final result by Inez Arizmendi MD (08/24/23 10:18:24)                   Impression:      No acute intracranial abnormality.    No significant change from the [facility] interpretation.      Electronically signed by: Inez Arizmendi  Date:    08/24/2023  Time:    10:18               Narrative:    EXAMINATION:  CT HEAD WITHOUT CONTRAST    TECHNIQUE:  Axial scans were  obtained from skull base to the vertex.    Coronal and sagittal reconstructions obtained from the axial data.    Automatic exposure control was utilized to limit radiation dose.    Contrast: None    Radiation Dose:    Total DLP: 12 90 mGy*cm    COMPARISON:  CT head dated 10/03/2022    FINDINGS:  There is no acute intracranial hemorrhage or edema. The gray-white matter differentiation is preserved.    There is no mass effect or midline shift. The ventricles and sulci are normal in size. The basal cisterns are patent. There is no abnormal extra-axial fluid collection.    The calvarium and skull base are intact. The visualized paranasal sinuses and the mastoid air cells are clear.                        Preliminary result by Inez Arizmendi MD (08/23/23 21:04:51)                   Narrative:    START OF REPORT:  TECHNIQUE: CT OF THE HEAD WAS PERFORMED WITHOUT INTRAVENOUS CONTRAST WITH AXIAL AS WELL AS CORONAL AND SAGITTAL IMAGES.    COMPARISON: NONE.    DOSAGE INFORMATION: AUTOMATED EXPOSURE CONTROL WAS UTILIZED.    CLINICAL HISTORY: TRAUMA LEVEL 2, FALL ON THINNERS, FACIAL TRAUMA.    Findings:  Hemorrhage: No acute intracranial hemorrhage is seen.  CSF spaces: The ventricles sulci and basal cisterns are within normal limits.  Brain parenchyma: Unremarkable with preservation of the grey white junction throughout.  Cerebellum: Unremarkable.  Vascular: Mild atheromatous calcification of the intracranial arteries is seen.  Sella and skull base: The sella appears to be within normal limits for age.  Intracranial calcifications: Incidental note is made of bilateral choroid plexus calcification. Incidental note is made of some pineal region calcification.  Calvarium: No acute linear or depressed skull fracture is seen.    Maxillofacial Structures: Maxillofacial findings discussed separately in the maxillofacial CT report.      Impression:  1. No acute intracranial traumatic injury identified. Details and other findings  as noted above.                                         CT Wrist Without Contrast Left (Final result)  Result time 08/24/23 11:00:36      Final result by Thomas Vang MD (08/24/23 11:00:36)                   Impression:      A highly comminuted intra-articular fracture is present to the distal radius with 100% dorsal displacement of a dorsal fracture fragment, loss of volar tilt, and gross instability of the distal radioulnar joint.    A comminuted intra-articular fracture is present to the distal ulna and ulnar styloid.    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      Electronically signed by: Thomas Vang  Date:    08/24/2023  Time:    11:00               Narrative:    EXAMINATION:  CT WRIST WITHOUT CONTRAST LEFT    CLINICAL HISTORY:  Wrist fracture    TECHNIQUE:  Unenhanced axial images of the hand and wrist were obtained along with coronal and sagittal reconstructions.  Automatic exposure control was used to reduce radiation exposure to the patient.    COMPARISON:  Radiographs of the left wrist used for comparison dated 08/03/2023    FINDINGS:  A highly comminuted intra-articular fracture is present to the distal radius with 100% dorsal displacement of a dorsal fracture fragment, loss of volar tilt, and preservation of radial inclination.  A comminuted intra-articular fracture is present to the distal ulna and ulnar styloid.  The distal radioulnar joint is likely unstable.    The hook of the hamate is intact.  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  The midcarpal joint is preserved.  The carpal metacarpal joints are preserved.    The study is not optimized to evaluate the soft tissues however the tendons appear grossly intact at the level of the distal radioulnar joint.  Intrinsic muscles are unremarkable.  Marked subcutaneous edema is present about the wrist.                        Preliminary result by Thomas Vang  MD LIZZY (08/23/23 21:02:33)                   Narrative:    START OF REPORT:  TECHNIQUE: CT OF THE UPPER EXTREMITY WAS PERFORMED WITHOUT INTRAVENOUS CONTRAST.    COMPARISON: NONE.    CLINICAL HISTORY: TRAUMA LEVEL 2, FALL ON THINNERS, FACIAL TRAUMA, LEFT WRIST TRAUMA.    Findings:  Wrist: Comminuted fracture of the distal radius and ulna is seen. The distal radial fracture shows slight impaction with intra-articular extension. Chip fracture of the ulnar styloid is also demonstrated. An incomplete fracture is noted in the lunate with a horizontal fracture line noted ventrally. Subcortical cysts are also noted in the lunate bone. Soft tissue swelling is noted in the wrist. The rest of the osseous structures are intact. Widened radiocarpal joint implies ligamentous injury.      Impression:  1. Comminuted fracture of the distal radius and ulna is seen. The distal radial fracture shows slight impaction with intra-articular extension. Chip fracture of the ulnar styloid is also demonstrated. An incomplete fracture is noted in the lunate with a horizontal fracture line noted ventrally. Widened radiocarpal joint implies ligamentous injury.  2. Other details and findings as discussed above.                                         CT Maxillofacial Without Contrast (Final result)  Result time 08/24/23 10:20:15      Final result by Inez Arizmendi MD (08/24/23 10:20:15)                   Impression:      No acute fracture identified.    No significant change from the [facility] interpretation.      Electronically signed by: Inez Arizmendi  Date:    08/24/2023  Time:    10:20               Narrative:    EXAMINATION:  CT MAXILLOFACIAL WITHOUT CONTRAST    CLINICAL HISTORY:  Facial trauma, blunt;    TECHNIQUE:  Volumetric CT acquisition of the facial bones without contrast. Axial, coronal and sagittal reconstructions.    Automatic exposure control was utilized to limit radiation dose.    DLP: 666  mGy-cm    COMPARISON:  None    FINDINGS:  There is no acute fracture identified.  The paranasal sinuses and mastoid air cells are clear.  The orbits and soft tissues are unremarkable.                        Preliminary result by Inez Arizmendi MD (08/23/23 21:01:29)                   Narrative:    START OF REPORT:  TECHNIQUE: MAXILLOFACIAL CT WAS PERFORMED WITH INTRAVENOUS CONTRAST WITH AXIAL AS WELL AS SAGITTAL AND CORONAL IMAGES.    COMPARISON: NONE.    CLINICAL HISTORY: TRAUMA LEVEL 2, FALL ON THINNERS, FACIAL TRAUMA.    Findings:  Facial soft tissues: Unremarkable.  Orbital soft tissues: The orbital soft tissues appear unremarkable.  Bones:  Orbital bony structures: The bilateral orbital bony structures are intact with no orbital fracture identified.  Mandible: The mandible appears unremarkable.  Maxilla: The maxilla appears unremarkable.  Zygoma: The zygomatic arches are intact.  TMJ: The mandibular condyles appear normally placed with respect to the mandibular fossa.  Nasal Bones: No displaced nasal bone fracture is seen. Mild rightward deviation of the nasal septum is seen.  Skull: No acute linear or depressed fracture is identified in the visualized skull.  Paranasal sinuses: The visualized paranasal sinuses appear clear with no significant mucoperiosteal thickening or air fluid levels identified.  Mastoid air cells: The visualized mastoid air cells appear clear.  Brain: Intracranial findings discussed separately.      Impression:  1. No acute maxillofacial fracture identified. Details and findings as noted above.                                         Lab Review:   CBC:  Recent Labs   Lab Result Units 08/23/23  2113 08/24/23  0417 08/25/23  0549   WBC x10(3)/mcL 9.89 9.05 9.35   RBC x10(6)/mcL 4.45 4.14* 4.15*   Hgb g/dL 13.0 11.9* 12.1   Hct % 38.1 35.7* 35.4*   Platelet x10(3)/mcL 168 188 204   MCV fL 85.6 86.2 85.3   MCH pg 29.2 28.7 29.2   MCHC g/dL 34.1 33.3 34.2       CMP:  Recent Labs   Lab  "Result Units 08/23/23  2113 08/24/23  0417 08/25/23  0549   Calcium Level Total mg/dL 9.6 9.0 8.2*   Albumin Level g/dL 3.9 3.6 3.3*   Sodium Level mmol/L 141 138 134*   Potassium Level mmol/L 3.5 3.7 4.3   Carbon Dioxide mmol/L 24 25 23   Blood Urea Nitrogen mg/dL 21.1* 15.9 13.7   Creatinine mg/dL 0.69 0.63 0.59   Alkaline Phosphatase unit/L 96 82 61   Alanine Aminotransferase unit/L 64* 57* 43   Aspartate Aminotransferase unit/L 52* 47* 33   Bilirubin Total mg/dL 1.0 1.0 1.4       Troponin:  No results for input(s): "TROPONINI" in the last 2160 hours.    ETOH:  No results for input(s): "ETHANOL" in the last 72 hours.     Urine Drug Screen:  No results for input(s): "COCAINE", "OPIATE", "BARBITURATE", "AMPHETAMINE", "FENTANYL", "CANNABINOIDS", "MDMA" in the last 72 hours.    Invalid input(s): "BENZODIAZEPINE", "PHENCYCLIDINE"   Plan:   62 y/o female with hx of afib on Eliquis presented after trip and fall resulting in L distal radius and ulnar fractures with a punctuate wound on the ulnar aspect. CT head neg. Pt admitted to trauma with consult to ortho. ORIF of L radius and ulna performed 8/24.     - Diabetic diet  - SSI  - Lovenox  - MM pain control   - Cardiology consulted for afib w/ RVR   - Resumed sotalol. Started IV digoxin and cardizem drip.    - Replete Mg & K    - Echo with EF of 45-50%  - Ancef per ortho recs  - NWB to LUE with ROM at fingers and elbows as tolerated. Continue short arm splint. F/u with ortho in 3 months for bridge plate removal.   - Home vs TTM     Asha Mcarthur, DO  [facility]  "

## 2023-08-24 NOTE — TRANSFER OF CARE
"Anesthesia Transfer of Care Note    Patient: Lauren Kaba    Procedure(s) Performed: Procedure(s) (LRB):  ORIF, FRACTURE, RADIUS (Left)    Patient location: PACU    Anesthesia Type: general    Post pain: adequate analgesia    Post assessment: no apparent anesthetic complications and tolerated procedure well    Post vital signs: stable    Level of consciousness: awake and alert    Nausea/Vomiting: no nausea/vomiting    Complications: none    Transfer of care protocol was followed      Last vitals:   Visit Vitals  /86 (BP Location: Right leg, Patient Position: Sitting)   Pulse (!) 132   Temp 36.5 °C (97.7 °F)   Resp 18   Ht 5' 4.02" (1.626 m)   Wt 108.9 kg (240 lb)   SpO2 96%   BMI 41.18 kg/m²     "

## 2023-08-24 NOTE — ED PROVIDER NOTES
Encounter Date: 8/23/2023    SCRIBE #1 NOTE: I, Amanda Lozano, am scribing for, and in the presence of,  Bridgett Holden MD. I have scribed the following portions of the note - Other sections scribed: HPI, ROS, PE.       History   No chief complaint on file.    63 year old female with a history of Afib on Eliquis and Sotalol, last taken today, and right knee replacement presents to ED via EMS as a level 2 trauma activation for a fall PTA. Per EMS, pt tripped and tried to catch herself with her left arm. EMS notes deformities to left wrist and puncture wounds on lips. Pt reports left arm pain and states her last food/liquid intake was at 15:30 today. Pt rates pain as 7/10 currently. EMS administered 100 Fentanyl en route.     The history is provided by the patient and the EMS personnel. No  was used.   Fall  The accident occurred just prior to arrival. The fall occurred in unknown circumstances. Distance fallen: Ground level. She landed on A hard floor. The volume of blood lost was minimal. The point of impact was the left wrist. The pain is present in the left wrist. The pain is at a severity of 7/10. She was Not ambulatory at the scene. There was No entrapment after the fall. There was No drug use involved in the accident. There was No alcohol use involved in the accident. Pertinent negatives include no back pain, no abdominal pain and no loss of consciousness. The symptoms are aggravated by pressure on the injury, extension, rotation and flexion. Prehospitalization: 100 Fentanyl. She has tried immobilization for the symptoms.     Review of patient's allergies indicates:   Allergen Reactions    Cipro [ciprofloxacin hcl] Hives     Past Medical History:   Diagnosis Date    Personal history of colonic polyps 09/27/2016     Past Surgical History:   Procedure Laterality Date    BREAST SURGERY  11/2013    Reduction    CARDIAC ELECTROPHYSIOLOGY STUDY AND ABLATION      CARDIOVERSION      x 3     COLONOSCOPY W/ POLYPECTOMY  09/27/2016    JOINT REPLACEMENT  5/2013    TONSILLECTOMY  1989     Family History   Problem Relation Age of Onset    Cancer Mother     Diabetes Mother     Arthritis Father     Heart disease Father      Social History     Tobacco Use    Smoking status: Never    Smokeless tobacco: Never   Substance Use Topics    Alcohol use: Never    Drug use: Never     Review of Systems   Gastrointestinal:  Negative for abdominal pain.   Musculoskeletal:  Positive for arthralgias (Left wrist). Negative for back pain.   Neurological:  Negative for loss of consciousness and syncope.       Physical Exam     Initial Vitals [08/23/23 2010]   BP Pulse Resp Temp SpO2   (!) 160/85 (!) 135 20 98.1 °F (36.7 °C) 100 %      MAP       --         Physical Exam    Nursing note and vitals reviewed.  Constitutional: She appears well-developed and well-nourished.   HENT:   Mouth/Throat: Oropharynx is clear and moist.   Superficial bite lo to mucosal surface upper lip over left maxillary molar, no dental avulsion or gingival bleeding   Eyes: Conjunctivae are normal.   Pupils 3-2 mm bilaterally   Neck: Neck supple.   No midline or paraspinal tenderness to palpation   Normal range of motion.  Cardiovascular:  Intact distal pulses. An irregularly irregular rhythm present.   Tachycardia present.         No murmur heard.  Pulses:       Radial pulses are 2+ on the right side and 2+ on the left side.        Dorsalis pedis pulses are 2+ on the right side and 2+ on the left side.   Pulmonary/Chest: Breath sounds normal. No respiratory distress. She exhibits no tenderness.   Abdominal: Abdomen is soft. Bowel sounds are normal. There is no abdominal tenderness.   Musculoskeletal:      Cervical back: Normal range of motion and neck supple.      Lumbar back: Normal. Normal range of motion.      Comments: 2+ DP pulses bilaterally  2+ Radial pulses bilaterally  swelling and deformity to distal radius of left upper extremity; small  puncture wound on ulnar side of left upper extremity  No cervical, thoracic, and lumbar spine tenderness, step offs, or deformities       Neurological: She is alert and oriented to person, place, and time. No cranial nerve deficit or sensory deficit.   Skin: Skin is warm and dry.         ED Course   Orthopedic Injury    Date/Time: 8/23/2023 8:30 PM    Performed by: Bridgett Holden MD  Authorized by: Bridgett Holden MD    Location procedure was performed:  [facility] EMERGENCY DEPARTMENT  Consent Done?:  Yes  Universal Protocol:     Verbal consent obtained?: Yes      Written consent obtained?: Yes      Consent given by:  Patient    Patient states understanding of procedure being performed: Yes      Patient's understanding of procedure matches consent: Yes      Imaging studies available: Yes      Patient identity confirmed:  Name and verbally with patient    Time Out: Immediately prior to the procedure a time out was called    Injury:     Injury location:  Wrist    Location details:  Left wrist    Injury type:  Dislocation    Dislocation type: distal radioulnar        Pre-procedure assessment:     Neurovascular status: Neurovascularly intact      Distal perfusion: normal      Neurological function: normal      Range of motion: reduced      Local anesthesia used?: No      Patient sedated?: Yes      ASA Class:  Class 2 - Mild Illness without functional impairment.    Mallampati Score:  Class 1 - Visualization of the soft palate, fauces, uvula, and anterior/posterior pillars.    Patient/Family history of anesthesia or sedation complications: No      Sedation type: moderate (conscious) sedation      Sedation:  Propofol    Sedation start:  8/23/2023 8:45 PM    Sedation end:  8/23/2023 8:52 PM    Vital signs: Vital signs monitored during sedation        Selections made in this section will also lock the Injury type section above.:     Manipulation performed?: Yes      Reduction method:  Traction and counter traction     Reduction method:  Traction and counter traction    Reduction method:  Traction and counter traction    Reduction method:  Traction and counter traction    Reduction method:  Traction and counter traction    Reduction method:  Traction and counter traction    Reduction successful?: Yes      Confirmation: Reduction confirmed by x-ray      Immobilization:  Splint    Splint type:  Sugar tong    Supplies used:  Cotton padding, elastic bandage and Ortho-Glass    Complications: No      Specimens: No      Implants: No    Post-procedure assessment:     Neurovascular status: Neurovascularly intact      Distal perfusion: normal      Neurological function: normal      Range of motion: splinted      Patient tolerance:  Patient tolerated the procedure well with no immediate complications  Critical Care    Date/Time: 8/23/2023 8:14 PM    Performed by: Bridgett Holden MD  Authorized by: Bridgett Holden MD  Direct patient critical care time: 21 minutes  Additional history critical care time: 3 minutes  Ordering / reviewing critical care time: 5 minutes  Documentation critical care time: 4 minutes  Consulting other physicians critical care time: 4 minutes  Total critical care time (exclusive of procedural time) : 37 minutes  Critical care time was exclusive of separately billable procedures and treating other patients.  Critical care was necessary to treat or prevent imminent or life-threatening deterioration of the following conditions: trauma, cardiac failure and circulatory failure.  Critical care was time spent personally by me on the following activities: development of treatment plan with patient or surrogate, discussions with consultants, interpretation of cardiac output measurements, evaluation of patient's response to treatment, examination of patient, obtaining history from patient or surrogate, ordering and performing treatments and interventions, ordering and review of laboratory studies, ordering and review of  "radiographic studies, pulse oximetry and re-evaluation of patient's condition.      Procedural Sedation        Date/Time: 8/23/2023 8:45 PM    Performed by: Bridgett Holden MD  Authorized by: Bridgett Holden MD  Consent Done: Yes  Consent: Verbal consent obtained.  Risks and benefits: risks, benefits and alternatives were discussed  Consent given by: patient  Patient understanding: patient states understanding of the procedure being performed  Patient consent: the patient's understanding of the procedure matches consent given  Patient identity confirmed: verbally with patient and name  Time out: Immediately prior to procedure a "time out" was called to verify the correct patient, procedure, equipment, support staff and site/side marked as required.  ASA Class: Class 2 - Mild Illness without functional impairment.  Equipment: on cardiac monitor., on BP monitor., on CO2 monitor., suction available., on supplemental oxygen. and airway equipment available.     Sedation type: moderate (conscious) sedation    Sedatives: propofol  Analgesia: fentanyl  Sedation start date/time: 8/23/2023 8:45 PM  Sedation end date/time: 8/23/2023 8:52 PM  Total Sedation Time (min): 7  Vitals: Vital signs were monitored during sedation.  Complications: No complications.    Procedural Clearance from TraumaPatient/Family history of anesthesia or sedation complications: No      Labs Reviewed   COMPREHENSIVE METABOLIC PANEL - Abnormal; Notable for the following components:       Result Value    Glucose Level 156 (*)     Blood Urea Nitrogen 21.1 (*)     Alanine Aminotransferase 64 (*)     Aspartate Aminotransferase 52 (*)     All other components within normal limits   APTT - Abnormal; Notable for the following components:    PTT 20.5 (*)     All other components within normal limits   PROTIME-INR - Normal   CBC W/ AUTO DIFFERENTIAL    Narrative:     The following orders were created for panel order CBC auto differential.  Procedure           "                     Abnormality         Status                     ---------                               -----------         ------                     CBC with Differential[[id number]]                            Final result                 Please view results for these tests on the individual orders.   CBC WITH DIFFERENTIAL     EKG Readings: (Independently Interpreted)   Initial Reading: No STEMI. Rhythm: Atrial Fibrillation. Heart Rate: 139. Clinical Impression: Atrial Fibrillation with RVR   08/23/2023 @ 2119  Diffuse ST depression, likely rate related, will try to control rate and repeat       Imaging Results              X-Ray Pelvis Routine AP (Final result)  Result time 08/23/23 22:12:13      Final result by Gilberto Claudio MD (08/23/23 22:12:13)                   Impression:      Limited study, fracture is not demonstrated      Electronically signed by: Gilberto Claudio MD  Date:    08/23/2023  Time:    22:12               Narrative:    EXAMINATION:  XR PELVIS ROUTINE AP    CLINICAL HISTORY:  trauma;    TECHNIQUE:  AP view of the pelvis was performed.    COMPARISON:  09/19/2017    FINDINGS:  There are no acute fractures seen.  The film is under penetrated.  There is no dislocation.                                       X-Ray Chest AP Portable (Final result)  Result time 08/23/23 22:12:46      Final result by Gilberto Claudio MD (08/23/23 22:12:46)                   Impression:      Cardiomegaly, no acute disease is seen      Electronically signed by: Gilberto Claudio MD  Date:    08/23/2023  Time:    22:12               Narrative:    EXAMINATION:  XR CHEST AP PORTABLE    CLINICAL HISTORY:  Injury, unspecified, initial encounter    TECHNIQUE:  Single frontal view of the chest was performed.    COMPARISON:  None    FINDINGS:  No infiltrates are seen.  Heart is enlarged.  Costophrenic angles are clear.  There is vascular calcification noted.                                       X-Ray Wrist 2 View Left (Final  result)  Result time 08/23/23 21:40:40      Final result by David Samson MD (08/23/23 21:40:40)                   Impression:      Interval improved alignment of distal radius and ulnar fractures.      Electronically signed by: David Samson MD  Date:    08/23/2023  Time:    21:40               Narrative:    EXAMINATION:  Left wrist two views    CLINICAL HISTORY:  Postreduction    COMPARISON:  Same day    FINDINGS:  Overlying cast noted.  There has been interval reduction with improved alignment of comminuted distal radius and ulnar fractures.                                       CT 3D RECON WITH INDEPENDENT WS (Final result)  Result time 08/24/23 13:22:20      Final result by Maine Flores MD (08/24/23 13:22:20)                   Narrative:    EXAMINATION:  CT 3D RECON WITH INDEPENDENT WS    CLINICAL HISTORY:  FRACTURE;    TECHNIQUE:  3D volumetric rotating reconstructions of the bony wrist were created using the axial data set from CT wrist 08/23/2023.    COMPARISON:  None    FINDINGS:  See report associated with accession number [id number] for findings.      Electronically signed by: Maine Flores  Date:    08/24/2023  Time:    13:22                                     X-Ray Wrist Complete Left (Final result)  Result time 08/23/23 21:36:34      Final result by David Samson MD (08/23/23 21:36:34)                   Impression:      Fracture of the distal radius and ulna      Electronically signed by: David Samson MD  Date:    08/23/2023  Time:    21:36               Narrative:    EXAMINATION:  Left wrist three views    CLINICAL HISTORY:  Trauma, injury    COMPARISON:  None    FINDINGS:  There is comminuted, displaced, impacted intra-articular fracture of the distal radius.  There is comminuted angulated and displaced fracture of the distal ulna.                                       CT Head Without Contrast (Final result)  Result time 08/24/23 10:18:24      Final result by Inez Arizmendi  MD JANAY (08/24/23 10:18:24)                   Impression:      No acute intracranial abnormality.    [facility].      Electronically signed by: Inez Arizmendi  Date:    08/24/2023  Time:    10:18               Narrative:    EXAMINATION:  CT HEAD WITHOUT CONTRAST    TECHNIQUE:  Axial scans were obtained from skull base to the vertex.    Coronal and sagittal reconstructions obtained from the axial data.    Automatic exposure control was utilized to limit radiation dose.    Contrast: None    Radiation Dose:    Total DLP: 12 90 mGy*cm    COMPARISON:  CT head dated 10/03/2022    FINDINGS:  There is no acute intracranial hemorrhage or edema. The gray-white matter differentiation is preserved.    There is no mass effect or midline shift. The ventricles and sulci are normal in size. The basal cisterns are patent. There is no abnormal extra-axial fluid collection.    The calvarium and skull base are intact. The visualized paranasal sinuses and the mastoid air cells are clear.                        Preliminary result by Inez Arizmendi MD (08/23/23 21:04:51)                   Narrative:    START OF REPORT:  TECHNIQUE: CT OF THE HEAD WAS PERFORMED WITHOUT INTRAVENOUS CONTRAST WITH AXIAL AS WELL AS CORONAL AND SAGITTAL IMAGES.    COMPARISON: NONE.    DOSAGE INFORMATION: AUTOMATED EXPOSURE CONTROL WAS UTILIZED.    CLINICAL HISTORY: TRAUMA LEVEL 2, FALL ON THINNERS, FACIAL TRAUMA.    Findings:  Hemorrhage: No acute intracranial hemorrhage is seen.  CSF spaces: The ventricles sulci and basal cisterns are within normal limits.  Brain parenchyma: Unremarkable with preservation of the grey white junction throughout.  Cerebellum: Unremarkable.  Vascular: Mild atheromatous calcification of the intracranial arteries is seen.  Sella and skull base: The sella appears to be within normal limits for age.  Intracranial calcifications: Incidental note is made of bilateral choroid plexus  calcification. Incidental note is made of some pineal region calcification.  Calvarium: No acute linear or depressed skull fracture is seen.    Maxillofacial Structures: Maxillofacial findings discussed separately in the maxillofacial CT report.      Impression:  1. No acute intracranial traumatic injury identified. Details and other findings as noted above.                                         CT Wrist Without Contrast Left (Final result)  Result time 08/24/23 11:00:36      Final result by Thomas Vang MD (08/24/23 11:00:36)                   Impression:      A highly comminuted intra-articular fracture is present to the distal radius with 100% dorsal displacement of a dorsal fracture fragment, loss of volar tilt, and gross instability of the distal radioulnar joint.    A comminuted intra-articular fracture is present to the distal ulna and ulnar styloid.    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      Electronically signed by: Thomas Vang  Date:    08/24/2023  Time:    11:00               Narrative:    EXAMINATION:  CT WRIST WITHOUT CONTRAST LEFT    CLINICAL HISTORY:  Wrist fracture    TECHNIQUE:  Unenhanced axial images of the hand and wrist were obtained along with coronal and sagittal reconstructions.  Automatic exposure control was used to reduce radiation exposure to the patient.    COMPARISON:  Radiographs of the left wrist used for comparison dated 08/03/2023    FINDINGS:  A highly comminuted intra-articular fracture is present to the distal radius with 100% dorsal displacement of a dorsal fracture fragment, loss of volar tilt, and preservation of radial inclination.  A comminuted intra-articular fracture is present to the distal ulna and ulnar styloid.  The distal radioulnar joint is likely unstable.    The hook of the hamate is intact.  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  The midcarpal joint is  preserved.  The carpal metacarpal joints are preserved.    The study is not optimized to evaluate the soft tissues however the tendons appear grossly intact at the level of the distal radioulnar joint.  Intrinsic muscles are unremarkable.  Marked subcutaneous edema is present about the wrist.                        Preliminary result by Thomas Vang MD (08/23/23 21:02:33)                   Narrative:    START OF REPORT:  TECHNIQUE: CT OF THE UPPER EXTREMITY WAS PERFORMED WITHOUT INTRAVENOUS CONTRAST.    COMPARISON: NONE.    CLINICAL HISTORY: TRAUMA LEVEL 2, FALL ON THINNERS, FACIAL TRAUMA, LEFT WRIST TRAUMA.    Findings:  Wrist: Comminuted fracture of the distal radius and ulna is seen. The distal radial fracture shows slight impaction with intra-articular extension. Chip fracture of the ulnar styloid is also demonstrated. An incomplete fracture is noted in the lunate with a horizontal fracture line noted ventrally. Subcortical cysts are also noted in the lunate bone. Soft tissue swelling is noted in the wrist. The rest of the osseous structures are intact. Widened radiocarpal joint implies ligamentous injury.      Impression:  1. Comminuted fracture of the distal radius and ulna is seen. The distal radial fracture shows slight impaction with intra-articular extension. Chip fracture of the ulnar styloid is also demonstrated. An incomplete fracture is noted in the lunate with a horizontal fracture line noted ventrally. Widened radiocarpal joint implies ligamentous injury.  2. Other details and findings as discussed above.                                         CT Maxillofacial Without Contrast (Final result)  Result time 08/24/23 10:20:15      Final result by Inez Arizmendi MD (08/24/23 10:20:15)                   Impression:      No acute fracture identified.    No significant change from the [facility] interpretation.      Electronically signed by: Inez Arizmendi  Date:    08/24/2023  Time:    10:20                Narrative:    EXAMINATION:  CT MAXILLOFACIAL WITHOUT CONTRAST    CLINICAL HISTORY:  Facial trauma, blunt;    TECHNIQUE:  Volumetric CT acquisition of the facial bones without contrast. Axial, coronal and sagittal reconstructions.    Automatic exposure control was utilized to limit radiation dose.    DLP: 666 mGy-cm    COMPARISON:  None    FINDINGS:  There is no acute fracture identified.  The paranasal sinuses and mastoid air cells are clear.  The orbits and soft tissues are unremarkable.                        Preliminary result by Inez Arizmendi MD (08/23/23 21:01:29)                   Narrative:    START OF REPORT:  TECHNIQUE: MAXILLOFACIAL CT WAS PERFORMED WITH INTRAVENOUS CONTRAST WITH AXIAL AS WELL AS SAGITTAL AND CORONAL IMAGES.    COMPARISON: NONE.    CLINICAL HISTORY: TRAUMA LEVEL 2, FALL ON THINNERS, FACIAL TRAUMA.    Findings:  Facial soft tissues: Unremarkable.  Orbital soft tissues: The orbital soft tissues appear unremarkable.  Bones:  Orbital bony structures: The bilateral orbital bony structures are intact with no orbital fracture identified.  Mandible: The mandible appears unremarkable.  Maxilla: The maxilla appears unremarkable.  Zygoma: The zygomatic arches are intact.  TMJ: The mandibular condyles appear normally placed with respect to the mandibular fossa.  Nasal Bones: No displaced nasal bone fracture is seen. Mild rightward deviation of the nasal septum is seen.  Skull: No acute linear or depressed fracture is identified in the visualized skull.  Paranasal sinuses: The visualized paranasal sinuses appear clear with no significant mucoperiosteal thickening or air fluid levels identified.  Mastoid air cells: The visualized mastoid air cells appear clear.  Brain: Intracranial findings discussed separately.      Impression:  1. No acute maxillofacial fracture identified. Details and findings as noted above.                                             Medical Decision  Making  Problems Addressed:  Anticoagulated by anticoagulation treatment: chronic illness or injury  Atrial fibrillation with RVR: chronic illness or injury with exacerbation, progression, or side effects of treatment  Closed head injury, initial encounter: acute illness or injury  Puncture wound without foreign body of lip, initial encounter: acute illness or injury  Type I or II open fracture of distal end of both radius and ulna of left upper extremity, initial encounter: acute illness or injury    Amount and/or Complexity of Data Reviewed  Independent Historian: EMS     Details: Per EMS, pt tripped and tried to catch herself with her left arm. EMS notes deformities to left wrist and puncture wounds on lips. EMS administered 100 Fentanyl en route.  Labs: ordered. Decision-making details documented in ED Course.  Radiology: ordered and independent interpretation performed. Decision-making details documented in ED Course.    Risk  Prescription drug management.  Decision regarding hospitalization.            Scribe Attestation:   Scribe #1: I performed the above scribed service and the documentation accurately describes the services I performed. I attest to the accuracy of the note.    Attending Attestation:           Physician Attestation for Scribe:  Physician Attestation Statement for Scribe #1: IAdina Kayla O, MD, reviewed documentation, as scribed by Amanda Lozano in my presence, and it is both accurate and complete.                     ED assessment:    Ms. Kaba presented following ground level fall, on Eliquis anticoagulation and struck her head in the fall.  No LOC. left wrist deformity with focal wound, distal neurovascular examination intact.    Differential diagnosis (including but not limited to):   Focal open distal radius and ulna fracture of the left upper extremity, closed head injury, intracranial hemorrhage, facial contusion, facial fracture, dental injury, lip laceration, atrial fibrillation  with rapid ventricular response, electrolyte derangements    ED management:   X-ray demonstrated displaced, angulated distal radius and ulna fracture.  Patient consented for sedation and reduction and after discussion of risks, benefits, and alternatives she agreed.  Reduction completed with improved alignment; however, due to highly comminuted distal fragments, remains unstable injury.  Discussed with orthopedics who agreed with CT scan, plan for attempted operative repair tomorrow.  CT of the head and face demonstrated no significant traumatic injuries sustained.  Laboratory studies unremarkable as well.  In atrial fibrillation with rapid ventricular response.  Suspect some element of the tachycardia due to pain response to the acute trauma.  She takes sotalol at home and did take an evening dose today.  IV metoprolol has been ordered to try to attempt better rate control.  Presently hemodynamically stable and asymptomatic.  Trauma team agrees with admission.  Advised they may contact Cardiology if not improving with IV pushes of beta-blockers for further direction, in the interim will resume home meds.    My independent radiology interpretation:   Chest x-ray:  No displaced rib fracture, no pneumothorax, no focal infiltrate or effusion.    X-ray pelvis:  No acute fracture subluxation  X-ray left wrist, highly comminuted and displaced distal radius and ulna fracture with dorsal angulation.    X-ray left wrist, postreduction: Improved alignment compared to prior      Amount and/or Complexity of Data Reviewed  Independent historian: EMS   Summary of history: ground level fall at home, + head impact, lip laceration, wrist deformity  External data reviewed: notes from clinic visits  Summary of data reviewed: hx atrial fibrillation, on eliquis anticoagulation  Risk and benefits of testing: discussed   Labs: ordered and reviewed  Radiology: ordered and independent interpretation performed (see above or ED  course)  ECG/medicine tests: ordered and independent interpretation performed (see above or ED course)  Discussion of management or test interpretation with external provider(s): discussed with orthopedics, trauma surgery consultant   Summary of discussion: as above    Risk  Parenteral controlled substances   Decision regarding hospitalization  Shared decision making     Critical Care  30-74 minutes     Bridgett MCQUEEN MD personally performed the history, PE, MDM, and procedures as documented above and agree with the scribe's documentation.             Clinical Impression:   Final diagnoses:  [S52.502B, S52.602B] Type I or II open fracture of distal end of both radius and ulna of left upper extremity, initial encounter  [S09.90XA] Closed head injury, initial encounter  [I48.91] Atrial fibrillation with RVR  [Z79.01] Anticoagulated by anticoagulation treatment  [S01.531A] Puncture wound without foreign body of lip, initial encounter        ED Disposition Condition    Admit                 Bridgett Holden MD  08/24/23 [id number]

## 2023-08-24 NOTE — NURSING
Nurses Note -- 4 Eyes      8/24/2023   6:51 PM      Skin assessed during: Transfer      [x] No Altered Skin Integrity Present    []Prevention Measures Documented      [] Yes- Altered Skin Integrity Present or Discovered   [] LDA Added if Not in Epic (Describe Wound)   [] New Altered Skin Integrity was Present on Admit and Documented in LDA   [] Wound Image Taken    Wound Care Consulted? No    Attending Nurse:  Geronimo Sosa RN     Second RN/Staff Member:   Miah Benton RN

## 2023-08-24 NOTE — PROGRESS NOTES
Pt Hx and procedures discussed in detail. Post op orders reviewed. Pt attached to v/s machine and vitals reviewed. Procedure site and Ivs assessed and intact. Everyone understands pt info with no further questions.

## 2023-08-24 NOTE — ED NOTES
Log roll performed with no stepoff or deformity noted to spine. No changes to neuro after log roll noted.

## 2023-08-24 NOTE — CONSULTS
Inpatient consult to Cardiology  Consult performed by: Jaqui Arreola FNP  Consult ordered by: Janak Jerez FNP      [facility] - 8th Floor Med Surg    Cardiology  Consult Note    Patient Name: Lauren Kaba  MRN: [id number]  Admission Date: 8/23/2023  Hospital Length of Stay: 1 days  Code Status: Full Code   Attending Provider: Melo Mehta Jr., *   Consulting Provider: CHASTITY Troncoso  Primary Care Physician: Roni Mckeon II, MD  Principal Problem:<principal problem not specified>    Patient information was obtained from patient, past medical records, and ER records.     Subjective:     Chief Complaint:  afib, RVR  HPI:   Ms. Kaba is a 64 y/o female, followed by Dr. Valentino/Dr. Price, who presented to Ed as trauma 2 post trip with GLF and resultant left arm pain. Workup revealed distal ulna radial fracture on the left with punctate wound on ulnar aspect. CT head negative. She was found to be in Afib, RVR upon ED presentation with improvement in rate with pain control. She does have a hx of Aflutter and has undergone multiple DCCV/ablations and is on Sotalol and eliquis outpatient. Plans are for surgery. [facility] has been consulted for refractory Afib, RVR. She is currently aflutter, 130's. Denies any complaints      PMH: typical Aflutter/eliquis/sotalol, breast reduction  PSH: DCCV x 3, Ablations, right knee replacement, tonsillectomy  Family History: Mother- cancer, DM2; Father- arthritis, heart disease  Social History: denies smoking, alcohol, of drug use    Previous Cardiac Diagnostics:   EP study Aflutter ablation with PVI 11.09.22:  Ablation of the following areas was performed: Pulmonary vein isolation   Cavo-tricuspid isthmus (typical atrial flutter), Posterior LA wall   Conduction block was verified across the posterior wall and the CTI.   Conduction block across the mitral isthmus was present from the prior   procedure.     DCCV for typical A  Le[place]tyo (Dr. Price) 07.25.22:  Single synchronized 200 J biphasic shock successfully converted to SR    Aflutter ablation  06.29.22:  Typical cavo-tricuspid Isthmus dependent Aflutter  Atypical Aflutter  PVI    Past Medical History:   Diagnosis Date    Personal history of colonic polyps 09/27/2016       Past Surgical History:   Procedure Laterality Date    BREAST SURGERY  11/2013    Reduction    CARDIAC ELECTROPHYSIOLOGY STUDY AND ABLATION      CARDIOVERSION      x 3    COLONOSCOPY W/ POLYPECTOMY  09/27/2016    JOINT REPLACEMENT  5/2013    TONSILLECTOMY  1989       Review of patient's allergies indicates:   Allergen Reactions    Cipro [ciprofloxacin hcl] Hives       No current facility-administered medications on file prior to encounter.     Current Outpatient Medications on File Prior to Encounter   Medication Sig    apixaban (ELIQUIS) 5 mg Tab Take 5 mg by mouth 2 (two) times daily.    ascorbic acid, vitamin C, (VITAMIN C) 500 MG tablet Take 500 mg by mouth.    blood sugar diagnostic (ACCU-CHEK GUIDE TEST STRIPS) Strp USE TO TEST SUGARS FOUR TIMES A DAY    celecoxib (CELEBREX) 200 MG capsule TAKE ONE CAPSULE BY MOUTH EVERY DAY    dulaglutide (TRULICITY) 1.5 mg/0.5 mL pen injector Inject 1.5 mg into the skin every 7 days.    fexofenadine (ALLEGRA) 180 MG tablet Take 180 mg by mouth.    fluticasone propionate (FLONASE) 50 mcg/actuation nasal spray 1 spray by Nasal route.    glucosamine HCl (GLUCOSAMINE, BULK, MISC)     metFORMIN (GLUCOPHAGE-XR) 500 MG ER 24hr tablet TAKE ONE TABLET BY MOUTH TWICE A DAY    MULTIVITAMIN ORAL Take 1 tablet by mouth once daily.    omega 3-dha-epa-fish oil 1,200 (144-216) mg Cap Take 1 capsule by mouth once daily.    sotaloL (BETAPACE) 120 MG Tab Take 120 mg by mouth 2 (two) times daily.    vitamin D (VITAMIN D3) 1000 units Tab Take 1,000 Units by mouth.     Family History       Problem Relation (Age of Onset)    Arthritis Father    Cancer Mother    Diabetes Mother    Heart disease  Father          Tobacco Use    Smoking status: Never    Smokeless tobacco: Never   Substance and Sexual Activity    Alcohol use: Never    Drug use: Never    Sexual activity: Not Currently       Review of Systems   Respiratory:  Negative for cough and shortness of breath.    Cardiovascular:  Negative for chest pain and palpitations.   Gastrointestinal: Negative.    Genitourinary: Negative.    Musculoskeletal:         Left wrist fracture   Skin:         Puncture wound to left wrist   Neurological: Negative.        Objective:     Vital Signs (Most Recent):  Temp: 98.7 °F (37.1 °C) (08/24/23 0725)  Pulse: (!) 130 (08/24/23 0725)  Resp: 16 (08/24/23 0429)  BP: 119/79 (08/24/23 0725)  SpO2: 96 % (08/24/23 0725) Vital Signs (24h Range):  Temp:  [98.1 °F (36.7 °C)-98.7 °F (37.1 °C)] 98.7 °F (37.1 °C)  Pulse:  [117-142] 130  Resp:  [13-20] 16  SpO2:  [94 %-100 %] 96 %  BP: ()/(60-94) 119/79     Weight: 108.9 kg (240 lb)  Body mass index is 41.18 kg/m².    SpO2: 96 %         Intake/Output Summary (Last 24 hours) at 8/24/2023 0822  Last data filed at 8/24/2023 0600  Gross per 24 hour   Intake 60 ml   Output 300 ml   Net -240 ml       Lines/Drains/Airways       Peripheral Intravenous Line  Duration                  Peripheral IV - Single Lumen 08/23/23 2009 20 G Anterior;Right Hand <1 day         Peripheral IV - Single Lumen 08/23/23 2009 20 G Anterior;Right Hand <1 day                    Significant Labs:  Recent Results (from the past 72 hour(s))   Comprehensive metabolic panel    Collection Time: 08/23/23  9:13 PM   Result Value Ref Range    Sodium Level 141 136 - 145 mmol/L    Potassium Level 3.5 3.5 - 5.1 mmol/L    Chloride 105 98 - 107 mmol/L    Carbon Dioxide 24 23 - 31 mmol/L    Glucose Level 156 (H) 82 - 115 mg/dL    Blood Urea Nitrogen 21.1 (H) 9.8 - 20.1 mg/dL    Creatinine 0.69 0.55 - 1.02 mg/dL    Calcium Level Total 9.6 8.4 - 10.2 mg/dL    Protein Total 6.5 5.8 - 7.6 gm/dL    Albumin Level 3.9 3.4 - 4.8 g/dL     Globulin 2.6 2.4 - 3.5 gm/dL    Albumin/Globulin Ratio 1.5 1.1 - 2.0 ratio    Bilirubin Total 1.0 <=1.5 mg/dL    Alkaline Phosphatase 96 40 - 150 unit/L    Alanine Aminotransferase 64 (H) 0 - 55 unit/L    Aspartate Aminotransferase 52 (H) 5 - 34 unit/L    eGFR >60 mls/min/1.73/m2   Protime-INR    Collection Time: 08/23/23  9:13 PM   Result Value Ref Range    PT 13.1 12.5 - 14.5 seconds    INR 1.0 <=1.3   APTT    Collection Time: 08/23/23  9:13 PM   Result Value Ref Range    PTT 20.5 (L) 23.2 - 33.7 seconds   CBC with Differential    Collection Time: 08/23/23  9:13 PM   Result Value Ref Range    WBC 9.89 4.50 - 11.50 x10(3)/mcL    RBC 4.45 4.20 - 5.40 x10(6)/mcL    Hgb 13.0 12.0 - 16.0 g/dL    Hct 38.1 37.0 - 47.0 %    MCV 85.6 80.0 - 94.0 fL    MCH 29.2 27.0 - 31.0 pg    MCHC 34.1 33.0 - 36.0 g/dL    RDW 13.4 11.5 - 17.0 %    Platelet 168 130 - 400 x10(3)/mcL    MPV 9.1 7.4 - 10.4 fL    Neut % 66.9 %    Lymph % 21.2 %    Mono % 8.6 %    Eos % 2.5 %    Basophil % 0.5 %    Lymph # 2.10 0.6 - 4.6 x10(3)/mcL    Neut # 6.61 2.1 - 9.2 x10(3)/mcL    Mono # 0.85 0.1 - 1.3 x10(3)/mcL    Eos # 0.25 0 - 0.9 x10(3)/mcL    Baso # 0.05 <=0.2 x10(3)/mcL    IG# 0.03 0 - 0.04 x10(3)/mcL    IG% 0.3 %    NRBC% 0.0 %   Magnesium    Collection Time: 08/24/23  4:17 AM   Result Value Ref Range    Magnesium Level 1.40 (L) 1.60 - 2.60 mg/dL   Phosphorus    Collection Time: 08/24/23  4:17 AM   Result Value Ref Range    Phosphorus Level 3.5 2.3 - 4.7 mg/dL   Comprehensive metabolic panel    Collection Time: 08/24/23  4:17 AM   Result Value Ref Range    Sodium Level 138 136 - 145 mmol/L    Potassium Level 3.7 3.5 - 5.1 mmol/L    Chloride 104 98 - 107 mmol/L    Carbon Dioxide 25 23 - 31 mmol/L    Glucose Level 167 (H) 82 - 115 mg/dL    Blood Urea Nitrogen 15.9 9.8 - 20.1 mg/dL    Creatinine 0.63 0.55 - 1.02 mg/dL    Calcium Level Total 9.0 8.4 - 10.2 mg/dL    Protein Total 6.2 5.8 - 7.6 gm/dL    Albumin Level 3.6 3.4 - 4.8 g/dL    Globulin  2.6 2.4 - 3.5 gm/dL    Albumin/Globulin Ratio 1.4 1.1 - 2.0 ratio    Bilirubin Total 1.0 <=1.5 mg/dL    Alkaline Phosphatase 82 40 - 150 unit/L    Alanine Aminotransferase 57 (H) 0 - 55 unit/L    Aspartate Aminotransferase 47 (H) 5 - 34 unit/L    eGFR >60 mls/min/1.73/m2   C-reactive protein    Collection Time: 08/24/23  4:17 AM   Result Value Ref Range    C-Reactive Protein 1.80 <5.00 mg/L   Prealbumin    Collection Time: 08/24/23  4:17 AM   Result Value Ref Range    Prealbumin 23.6 14.0 - 37.0 mg/dL       Significant Imaging:  Imaging Results              X-Ray Pelvis Routine AP (Final result)  Result time 08/23/23 22:12:13      Final result by Gilberto Claudio MD (08/23/23 22:12:13)                   Impression:      Limited study, fracture is not demonstrated      Electronically signed by: Gilberto Claudio MD  Date:    08/23/2023  Time:    22:12               Narrative:    EXAMINATION:  XR PELVIS ROUTINE AP    CLINICAL HISTORY:  trauma;    TECHNIQUE:  AP view of the pelvis was performed.    COMPARISON:  09/19/2017    FINDINGS:  There are no acute fractures seen.  The film is under penetrated.  There is no dislocation.                                       X-Ray Chest AP Portable (Final result)  Result time 08/23/23 22:12:46      Final result by Gilberto Claudio MD (08/23/23 22:12:46)                   Impression:      Cardiomegaly, no acute disease is seen      Electronically signed by: Gilberto Claudio MD  Date:    08/23/2023  Time:    22:12               Narrative:    EXAMINATION:  XR CHEST AP PORTABLE    CLINICAL HISTORY:  Injury, unspecified, initial encounter    TECHNIQUE:  Single frontal view of the chest was performed.    COMPARISON:  None    FINDINGS:  No infiltrates are seen.  Heart is enlarged.  Costophrenic angles are clear.  There is vascular calcification noted.                                       X-Ray Wrist 2 View Left (Final result)  Result time 08/23/23 21:40:40      Final result by David Samson  MD ADITHYA (08/23/23 21:40:40)                   Impression:      Interval improved alignment of distal radius and ulnar fractures.      Electronically signed by: David Samson MD  Date:    08/23/2023  Time:    21:40               Narrative:    EXAMINATION:  Left wrist two views    CLINICAL HISTORY:  Postreduction    COMPARISON:  Same day    FINDINGS:  Overlying cast noted.  There has been interval reduction with improved alignment of comminuted distal radius and ulnar fractures.                                       CT 3D RECON WITH INDEPENDENT WS (In process)                      X-Ray Wrist Complete Left (Final result)  Result time 08/23/23 21:36:34      Final result by David Samson MD (08/23/23 21:36:34)                   Impression:      Fracture of the distal radius and ulna      Electronically signed by: David Samson MD  Date:    08/23/2023  Time:    21:36               Narrative:    EXAMINATION:  Left wrist three views    CLINICAL HISTORY:  Trauma, injury    COMPARISON:  None    FINDINGS:  There is comminuted, displaced, impacted intra-articular fracture of the distal radius.  There is comminuted angulated and displaced fracture of the distal ulna.                                       CT Head Without Contrast (Preliminary result)  Result time 08/23/23 21:04:51      Preliminary result by Patrick Bella Jr., MD (08/23/23 21:04:51)                   Narrative:    START OF REPORT:  TECHNIQUE: CT OF THE HEAD WAS PERFORMED WITHOUT INTRAVENOUS CONTRAST WITH AXIAL AS WELL AS CORONAL AND SAGITTAL IMAGES.    COMPARISON: NONE.    DOSAGE INFORMATION: AUTOMATED EXPOSURE CONTROL WAS UTILIZED.    CLINICAL HISTORY: TRAUMA LEVEL 2, FALL ON THINNERS, FACIAL TRAUMA.    Findings:  Hemorrhage: No acute intracranial hemorrhage is seen.  CSF spaces: The ventricles sulci and basal cisterns are within normal limits.  Brain parenchyma: Unremarkable with preservation of the grey white junction throughout.  Cerebellum:  Unremarkable.  Vascular: Mild atheromatous calcification of the intracranial arteries is seen.  Sella and skull base: The sella appears to be within normal limits for age.  Intracranial calcifications: Incidental note is made of bilateral choroid plexus calcification. Incidental note is made of some pineal region calcification.  Calvarium: No acute linear or depressed skull fracture is seen.    Maxillofacial Structures: Maxillofacial findings discussed separately in the maxillofacial CT report.      Impression:  1. No acute intracranial traumatic injury identified. Details and other findings as noted above.                                         CT Wrist Without Contrast Left (Preliminary result)  Result time 08/23/23 21:02:33      Preliminary result by Patrick Bella Jr., MD (08/23/23 21:02:33)                   Narrative:    START OF REPORT:  TECHNIQUE: CT OF THE UPPER EXTREMITY WAS PERFORMED WITHOUT INTRAVENOUS CONTRAST.    COMPARISON: NONE.    CLINICAL HISTORY: TRAUMA LEVEL 2, FALL ON THINNERS, FACIAL TRAUMA, LEFT WRIST TRAUMA.    Findings:  Wrist: Comminuted fracture of the distal radius and ulna is seen. The distal radial fracture shows slight impaction with intra-articular extension. Chip fracture of the ulnar styloid is also demonstrated. An incomplete fracture is noted in the lunate with a horizontal fracture line noted ventrally. Subcortical cysts are also noted in the lunate bone. Soft tissue swelling is noted in the wrist. The rest of the osseous structures are intact. Widened radiocarpal joint implies ligamentous injury.      Impression:  1. Comminuted fracture of the distal radius and ulna is seen. The distal radial fracture shows slight impaction with intra-articular extension. Chip fracture of the ulnar styloid is also demonstrated. An incomplete fracture is noted in the lunate with a horizontal fracture line noted ventrally. Widened radiocarpal joint implies ligamentous injury.  2. Other  details and findings as discussed above.                                         CT Maxillofacial Without Contrast (Preliminary result)  Result time 08/23/23 21:01:29      Preliminary result by Patrick Bella Jr., MD (08/23/23 21:01:29)                   Narrative:    START OF REPORT:  TECHNIQUE: MAXILLOFACIAL CT WAS PERFORMED WITH INTRAVENOUS CONTRAST WITH AXIAL AS WELL AS SAGITTAL AND CORONAL IMAGES.    COMPARISON: NONE.    CLINICAL HISTORY: TRAUMA LEVEL 2, FALL ON THINNERS, FACIAL TRAUMA.    Findings:  Facial soft tissues: Unremarkable.  Orbital soft tissues: The orbital soft tissues appear unremarkable.  Bones:  Orbital bony structures: The bilateral orbital bony structures are intact with no orbital fracture identified.  Mandible: The mandible appears unremarkable.  Maxilla: The maxilla appears unremarkable.  Zygoma: The zygomatic arches are intact.  TMJ: The mandibular condyles appear normally placed with respect to the mandibular fossa.  Nasal Bones: No displaced nasal bone fracture is seen. Mild rightward deviation of the nasal septum is seen.  Skull: No acute linear or depressed fracture is identified in the visualized skull.  Paranasal sinuses: The visualized paranasal sinuses appear clear with no significant mucoperiosteal thickening or air fluid levels identified.  Mastoid air cells: The visualized mastoid air cells appear clear.  Brain: Intracranial findings discussed separately.      Impression:  1. No acute maxillofacial fracture identified. Details and findings as noted above.                                        EKG:          Telemetry:        Physical Exam  Constitutional:       Appearance: She is obese.   HENT:      Mouth/Throat:      Mouth: Mucous membranes are moist.   Cardiovascular:      Rate and Rhythm: Tachycardia present. Rhythm irregular.      Pulses: Normal pulses.      Heart sounds: Normal heart sounds.   Pulmonary:      Effort: Pulmonary effort is normal.      Breath sounds:  Normal breath sounds.   Abdominal:      Palpations: Abdomen is soft.   Musculoskeletal:         General: Normal range of motion.      Comments: Brace to left arm   Neurological:      General: No focal deficit present.      Mental Status: She is alert.   Psychiatric:         Mood and Affect: Mood normal.         Home Medications:   No current facility-administered medications on file prior to encounter.     Current Outpatient Medications on File Prior to Encounter   Medication Sig Dispense Refill    apixaban (ELIQUIS) 5 mg Tab Take 5 mg by mouth 2 (two) times daily.      ascorbic acid, vitamin C, (VITAMIN C) 500 MG tablet Take 500 mg by mouth.      blood sugar diagnostic (ACCU-CHEK GUIDE TEST STRIPS) Strp USE TO TEST SUGARS FOUR TIMES A  strip 11    celecoxib (CELEBREX) 200 MG capsule TAKE ONE CAPSULE BY MOUTH EVERY DAY 30 capsule 5    dulaglutide (TRULICITY) 1.5 mg/0.5 mL pen injector Inject 1.5 mg into the skin every 7 days. 4 pen 5    fexofenadine (ALLEGRA) 180 MG tablet Take 180 mg by mouth.      fluticasone propionate (FLONASE) 50 mcg/actuation nasal spray 1 spray by Nasal route.      glucosamine HCl (GLUCOSAMINE, BULK, MISC)       metFORMIN (GLUCOPHAGE-XR) 500 MG ER 24hr tablet TAKE ONE TABLET BY MOUTH TWICE A DAY 60 tablet 6    MULTIVITAMIN ORAL Take 1 tablet by mouth once daily.      omega 3-dha-epa-fish oil 1,200 (144-216) mg Cap Take 1 capsule by mouth once daily.      sotaloL (BETAPACE) 120 MG Tab Take 120 mg by mouth 2 (two) times daily.      vitamin D (VITAMIN D3) 1000 units Tab Take 1,000 Units by mouth.         Current Inpatient Medications:    Current Facility-Administered Medications:     acetaminophen tablet 650 mg, 650 mg, Oral, Q4H, Janak Jerez FNP, 650 mg at 08/24/23 0536    ascorbic acid (vitamin C) tablet 500 mg, 500 mg, Oral, Daily, Janak Jerez FNP    ceFAZolin (ANCEF) 1 gram injection, , , ,     cefazolin (ANCEF) 2 gram in dextrose 5% 50 mL IVPB (premix), 2 g,  Intravenous, Q8H, Janak Jerez, BROOKP, Stopped at 08/24/23 0604    celecoxib capsule 200 mg, 200 mg, Oral, Daily, Janak Jerez, FNP    cetirizine tablet 10 mg, 10 mg, Oral, Daily, Janak Jerez, FNP    dextrose 10% bolus 125 mL 125 mL, 12.5 g, Intravenous, PRN, Janak Jerez, FNP    dextrose 10% bolus 250 mL 250 mL, 25 g, Intravenous, PRN, Janak Jerez, FNP    docusate sodium capsule 100 mg, 100 mg, Oral, BID, Janak Jerez, FNP, 100 mg at 08/23/23 2218    enoxaparin injection 40 mg, 40 mg, Subcutaneous, Q12H, Janak Jerez, BROOKP, 40 mg at 08/23/23 2218    fentaNYL (SUBLIMAZE) 50 mcg/mL injection, , , ,     fluticasone propionate 50 mcg/actuation nasal spray 50 mcg, 1 spray, Each Nostril, Daily, Janak Jerez FNP    gabapentin capsule 300 mg, 300 mg, Oral, TID, Janak Jerez, FNP, 300 mg at 08/23/23 2218    glucagon (human recombinant) injection 1 mg, 1 mg, Intramuscular, PRN, Janak Jerez, CHASTITY    glucose chewable tablet 16 g, 16 g, Oral, PRN, Janak Jerez, CHASTITY    glucose chewable tablet 24 g, 24 g, Oral, PRN, Janak Jerez, CHASTITY    insulin aspart U-100 injection 0-5 Units, 0-5 Units, Subcutaneous, QID (AC + HS) PRN, Janak Jerez, FNP    lactated ringers infusion, , Intravenous, Continuous, Janak Jerez, CHASTITY, Last Rate: 100 mL/hr at 08/23/23 2355, New Bag at 08/23/23 2355    magnesium hydroxide 400 mg/5 ml suspension 2,400 mg, 30 mL, Oral, Daily PRN, Janak Jerez FNP    melatonin tablet 6 mg, 6 mg, Oral, Nightly PRN, Janak Jerez, CHASTITY    methocarbamoL tablet 500 mg, 500 mg, Oral, Q8H, Janak Jerez, FNP, 500 mg at 08/24/23 0534    metoprolol injection 5 mg, 5 mg, Intravenous, Q5 Min PRN, Janak Jerez, FNP, 5 mg at 08/24/23 0104    multivitamin tablet, 1 tablet, Oral, Daily, Janak Jerez, FNP    omega 3-dha-epa-fish oil 1,000 mg (120 mg-180 mg) Cap 1 capsule, 1 capsule, Oral, Daily, Meri  Janak CARDENAS, FNP    ondansetron 4 mg/2 mL injection, , , ,     ondansetron injection 4 mg, 4 mg, Intravenous, Q6H PRN, Keith Goodwin O, DO, 4 mg at 08/24/23 0738    oxyCODONE immediate release tablet 5 mg, 5 mg, Oral, Q4H PRN, Jaank Jerez, FNP    oxyCODONE immediate release tablet Tab 10 mg, 10 mg, Oral, Q4H PRN, Janak Jerez, FNP, 10 mg at 08/23/23 2354    polyethylene glycol packet 17 g, 17 g, Oral, BID, Janak Jerez, FNPAUL    propofol (DIPRIVAN) 10 mg/mL IVP injection, , , ,     sotaloL tablet 120 mg, 120 mg, Oral, BID, Janak Jerez, CHASTITY    vitamin D 1000 units tablet 1,000 Units, 1,000 Units, Oral, Daily, Janak Jerez, CHASTITY         VTE Risk Mitigation (From admission, onward)           Ordered     enoxaparin injection 40 mg  Every 12 hours         08/23/23 2118     IP VTE HIGH RISK PATIENT  Once         08/23/23 2118     Place sequential compression device  Until discontinued         08/23/23 2118                    Assessment:   GLF  Distal Ulna radius fracture with punctate wound on ulnar aspect  pAflutter, RVR  --hx A flutter ablation 6/22 & 11/22  --Hx DCCV x 3- most recent 07.22  --CHADS VASC score 2 ( on eliquis outpatient)  Morbid obesity- BMI 41  Hypomagnesemia (1.4)    Plan:   Resume sotalol 120 mg bid. Start Cardizem drip for rate control.   Give digoxin 500 mcg IVP x 1 now then 250 mg IVP q 6hr x 2  Resume weight based Lovenox post surgery with placement back on Eliquis prior to DC home  Give MgSO4 4 gm IVPB and KCL 20 meq IVPB  Keep Mg > 42.0 and K+ > 4.5  Obtain echo once HR better controlled  OK to proceed with surgery from cardiology standpoint        Thank you for your consult.     Jaqui Arreola, CHASTITY  Cardiology  [facility] - 8th Floor Med Surg  08/24/2023 8:22 AM

## 2023-08-24 NOTE — ED NOTES
Plan on pt getting this liter of IVF and assessing pain after meds, will reassess HR if meds needed to help control HR. Will get EKG after CT.

## 2023-08-25 VITALS
TEMPERATURE: 98 F | WEIGHT: 240 LBS | SYSTOLIC BLOOD PRESSURE: 103 MMHG | HEART RATE: 94 BPM | OXYGEN SATURATION: 93 % | HEIGHT: 64 IN | BODY MASS INDEX: 40.97 KG/M2 | DIASTOLIC BLOOD PRESSURE: 61 MMHG | RESPIRATION RATE: 19 BRPM

## 2023-08-25 LAB
ALBUMIN SERPL-MCNC: 3.3 G/DL (ref 3.4–4.8)
ALBUMIN/GLOB SERPL: 1.4 RATIO (ref 1.1–2)
ALP SERPL-CCNC: 61 UNIT/L (ref 40–150)
ALT SERPL-CCNC: 43 UNIT/L (ref 0–55)
AST SERPL-CCNC: 33 UNIT/L (ref 5–34)
BASOPHILS # BLD AUTO: 0.01 X10(3)/MCL
BASOPHILS NFR BLD AUTO: 0.1 %
BILIRUB SERPL-MCNC: 1.4 MG/DL
BUN SERPL-MCNC: 13.7 MG/DL (ref 9.8–20.1)
CALCIUM SERPL-MCNC: 8.2 MG/DL (ref 8.4–10.2)
CHLORIDE SERPL-SCNC: 101 MMOL/L (ref 98–107)
CO2 SERPL-SCNC: 23 MMOL/L (ref 23–31)
CREAT SERPL-MCNC: 0.59 MG/DL (ref 0.55–1.02)
EOSINOPHIL # BLD AUTO: 0.01 X10(3)/MCL (ref 0–0.9)
EOSINOPHIL NFR BLD AUTO: 0.1 %
ERYTHROCYTE [DISTWIDTH] IN BLOOD BY AUTOMATED COUNT: 13.4 % (ref 11.5–17)
GFR SERPLBLD CREATININE-BSD FMLA CKD-EPI: >60 MLS/MIN/1.73/M2
GLOBULIN SER-MCNC: 2.4 GM/DL (ref 2.4–3.5)
GLUCOSE SERPL-MCNC: 170 MG/DL (ref 82–115)
HCT VFR BLD AUTO: 35.4 % (ref 37–47)
HGB BLD-MCNC: 12.1 G/DL (ref 12–16)
IMM GRANULOCYTES # BLD AUTO: 0.05 X10(3)/MCL (ref 0–0.04)
IMM GRANULOCYTES NFR BLD AUTO: 0.5 %
LYMPHOCYTES # BLD AUTO: 1.78 X10(3)/MCL (ref 0.6–4.6)
LYMPHOCYTES NFR BLD AUTO: 19 %
MAGNESIUM SERPL-MCNC: 1.8 MG/DL (ref 1.6–2.6)
MCH RBC QN AUTO: 29.2 PG (ref 27–31)
MCHC RBC AUTO-ENTMCNC: 34.2 G/DL (ref 33–36)
MCV RBC AUTO: 85.3 FL (ref 80–94)
MONOCYTES # BLD AUTO: 0.78 X10(3)/MCL (ref 0.1–1.3)
MONOCYTES NFR BLD AUTO: 8.3 %
NEUTROPHILS # BLD AUTO: 6.72 X10(3)/MCL (ref 2.1–9.2)
NEUTROPHILS NFR BLD AUTO: 72 %
NRBC BLD AUTO-RTO: 0 %
PLATELET # BLD AUTO: 204 X10(3)/MCL (ref 130–400)
PMV BLD AUTO: 9.8 FL (ref 7.4–10.4)
POCT GLUCOSE: 158 MG/DL (ref 70–110)
POCT GLUCOSE: 160 MG/DL (ref 70–110)
POTASSIUM SERPL-SCNC: 4.3 MMOL/L (ref 3.5–5.1)
PROT SERPL-MCNC: 5.7 GM/DL (ref 5.8–7.6)
RBC # BLD AUTO: 4.15 X10(6)/MCL (ref 4.2–5.4)
SODIUM SERPL-SCNC: 134 MMOL/L (ref 136–145)
WBC # SPEC AUTO: 9.35 X10(3)/MCL (ref 4.5–11.5)

## 2023-08-25 PROCEDURE — 63600175 PHARM REV CODE 636 W HCPCS: Performed by: NURSE PRACTITIONER

## 2023-08-25 PROCEDURE — 83735 ASSAY OF MAGNESIUM: CPT | Performed by: NURSE PRACTITIONER

## 2023-08-25 PROCEDURE — 27000221 HC OXYGEN, UP TO 24 HOURS

## 2023-08-25 PROCEDURE — 97535 SELF CARE MNGMENT TRAINING: CPT

## 2023-08-25 PROCEDURE — 25000003 PHARM REV CODE 250: Performed by: NURSE PRACTITIONER

## 2023-08-25 PROCEDURE — 80053 COMPREHEN METABOLIC PANEL: CPT | Performed by: NURSE PRACTITIONER

## 2023-08-25 PROCEDURE — 97161 PT EVAL LOW COMPLEX 20 MIN: CPT

## 2023-08-25 PROCEDURE — 97165 OT EVAL LOW COMPLEX 30 MIN: CPT

## 2023-08-25 PROCEDURE — 85025 COMPLETE CBC W/AUTO DIFF WBC: CPT | Performed by: NURSE PRACTITIONER

## 2023-08-25 RX ORDER — METHOCARBAMOL 500 MG/1
500 TABLET, FILM COATED ORAL EVERY 8 HOURS
Qty: 30 TABLET | Refills: 0 | Status: SHIPPED | OUTPATIENT
Start: 2023-08-25 | End: 2023-09-04

## 2023-08-25 RX ORDER — ASPIRIN 81 MG/1
81 TABLET ORAL 2 TIMES DAILY
Qty: 60 TABLET | Refills: 0 | Status: ON HOLD | OUTPATIENT
Start: 2023-08-25 | End: 2023-12-20 | Stop reason: HOSPADM

## 2023-08-25 RX ORDER — OXYCODONE HYDROCHLORIDE 10 MG/1
10 TABLET ORAL EVERY 4 HOURS PRN
Qty: 21 TABLET | Refills: 0 | Status: SHIPPED | OUTPATIENT
Start: 2023-08-25 | End: 2023-09-01

## 2023-08-25 RX ORDER — GABAPENTIN 300 MG/1
300 CAPSULE ORAL 3 TIMES DAILY
Qty: 42 CAPSULE | Refills: 0 | Status: SHIPPED | OUTPATIENT
Start: 2023-08-25 | End: 2023-09-18 | Stop reason: SDUPTHER

## 2023-08-25 RX ADMIN — ENOXAPARIN SODIUM 40 MG: 40 INJECTION SUBCUTANEOUS at 08:08

## 2023-08-25 RX ADMIN — Medication 500 MG: at 08:08

## 2023-08-25 RX ADMIN — SOTALOL HYDROCHLORIDE 120 MG: 120 TABLET ORAL at 08:08

## 2023-08-25 RX ADMIN — THERA TABS 1 TABLET: TAB at 08:08

## 2023-08-25 RX ADMIN — CEFAZOLIN SODIUM 2 G: 2 SOLUTION INTRAVENOUS at 01:08

## 2023-08-25 RX ADMIN — CEFAZOLIN SODIUM 2 G: 2 SOLUTION INTRAVENOUS at 06:08

## 2023-08-25 RX ADMIN — GABAPENTIN 300 MG: 300 CAPSULE ORAL at 08:08

## 2023-08-25 RX ADMIN — CETIRIZINE HYDROCHLORIDE 10 MG: 10 TABLET, FILM COATED ORAL at 08:08

## 2023-08-25 RX ADMIN — DOCUSATE SODIUM 100 MG: 100 CAPSULE, LIQUID FILLED ORAL at 08:08

## 2023-08-25 RX ADMIN — DILTIAZEM HYDROCHLORIDE 10 MG/HR: 5 INJECTION INTRAVENOUS at 01:08

## 2023-08-25 RX ADMIN — CELECOXIB 200 MG: 100 CAPSULE ORAL at 08:08

## 2023-08-25 RX ADMIN — Medication 1000 UNITS: at 08:08

## 2023-08-25 NOTE — PROGRESS NOTES
[facility] - [facility] Tele[facility]  Orthopedics  Progress Note    Patient Name: Lauren Kaba  MRN: [id number]  Admission Date: 8/23/2023  Hospital Length of Stay: 2 days  Attending Provider: Melo Mehta Jr., *  Primary Care Provider: Roni Mckeon II, MD  Follow-up For: Procedure(s) (LRB):  ORIF, FRACTURE, RADIUS (Left)    Post-Operative Day: 1 Day Post-Op  Subjective:     Principal Problem:<principal problem not specified>    Principal Orthopedic Problem: 1 Day Post-Op   ORIF left distal radius and ulna    Interval History: Patient doing well overall. Pain is well controlled. Soft dressing in place. Comfortable. On nasal cannula this morning. Denies numbness and tingling distally. Flexes and extends digits. Understands that she will not be able to bend her wrist due to fusion plate until removed.   Review of patient's allergies indicates:   Allergen Reactions    Cipro [ciprofloxacin hcl] Hives       Current Facility-Administered Medications   Medication    acetaminophen tablet 650 mg    ascorbic acid (vitamin C) tablet 500 mg    cefazolin (ANCEF) 2 gram in dextrose 5% 50 mL IVPB (premix)    celecoxib capsule 200 mg    cetirizine tablet 10 mg    dextrose 10% bolus 125 mL 125 mL    dextrose 10% bolus 250 mL 250 mL    docusate sodium capsule 100 mg    enoxaparin injection 40 mg    fluticasone propionate 50 mcg/actuation nasal spray 50 mcg    gabapentin capsule 300 mg    glucagon (human recombinant) injection 1 mg    glucose chewable tablet 16 g    glucose chewable tablet 24 g    insulin aspart U-100 injection 0-5 Units    lactated ringers infusion    magnesium hydroxide 400 mg/5 ml suspension 2,400 mg    melatonin tablet 6 mg    methocarbamoL tablet 500 mg    metoprolol injection 5 mg    morphine injection 4 mg    multivitamin tablet    omega 3-dha-epa-fish oil 1,000 mg (120 mg-180 mg) Cap 1 capsule    ondansetron injection 4 mg    oxyCODONE immediate release tablet 5 mg     "oxyCODONE immediate release tablet Tab 10 mg    polyethylene glycol packet 17 g    sotaloL tablet 120 mg    vitamin D 1000 units tablet 1,000 Units     Objective:     Vital Signs (Most Recent):  Temp: 98.3 °F (36.8 °C) (08/25/23 0619)  Pulse: 94 (08/25/23 0619)  Resp: 19 (08/24/23 1900)  BP: 103/61 (08/25/23 0619)  SpO2: (!) 93 % (08/25/23 1008) Vital Signs (24h Range):  Temp:  [98 °F (36.7 °C)-98.3 °F (36.8 °C)] 98.3 °F (36.8 °C)  Pulse:  [] 94  Resp:  [12-19] 19  SpO2:  [93 %-97 %] 93 %  BP: ()/() 103/61     Weight: 108.9 kg (240 lb)  Height: 5' 4.02" (162.6 cm)  Body mass index is 41.18 kg/m².      Intake/Output Summary (Last 24 hours) at 8/25/2023 1206  Last data filed at 8/24/2023 1829  Gross per 24 hour   Intake --   Output 600 ml   Net -600 ml       Physical Exam:   Musculoskeletal:     Left upper extremity: Dressing CDI without obvious drainage; compartments are soft and compressible; flexes and extends all digits, FROM left elbow. NO wrist rom due to bridge plate, appropriate tenderness to palpation; AIN/PIN/Ulnar motor intact; SILT distally;BCR distally; Radial pulse 2+        Diagnostic Findings:   Significant Labs:   Recent Lab Results  (Last 5 results in the past 72 hours)        08/25/23  0549   08/24/23  2045   08/24/23  1453   08/24/23  1041   08/24/23  0911        Albumin/Globulin Ratio 1.4               ABO and RH         O POS       Albumin 3.3               Alkaline Phosphatase 61               ALT 43               Ao peak roger       1.34         Ao VTI       20.90         AST 33               AV mean gradient       4         AV index (prosthetic)       0.58         AV peak gradient       7         AV Velocity Ratio       0.69         Baso # 0.01               Basophil % 0.1               BILIRUBIN TOTAL 1.4               BSA       2.22         BUN 13.7               Calcium 8.2               Chloride 101               CO2 23               Creatinine 0.59               Left " Ventricle Relative Wall Thickness       0.65         E/E' ratio       13.47         eGFR >60               Eos # 0.01               Eosinophil % 0.1               E wave deceleration time       103.00         FS       27         Globulin, Total 2.4               Glucose 170               Hematocrit 35.4               Hemoglobin 12.1               Immature Grans (Abs) 0.05               Immature Granulocytes 0.5               IVSd       1.40         LA size       3.10         LA volume       40.60         LA Volume Index (Mod)       19.2         LV LATERAL E/E' RATIO       14.43         LV SEPTAL E/E' RATIO       12.63         LV EDV BP       58.10         LV Diastolic Volume Index       27.54         LVIDd       3.70         LVIDs       2.70         LV mass       166.50         LV Mass Index       79         Left Ventricular Outflow Tract Mean Gradient       2.00         Left Ventricular Outflow Tract Mean Velocity       0.65         LVOT peak alex       0.93         LVOT peak VTI       12.10         LV ESV BP       27.00         LV Systolic Volume Index       12.8         Lymph # 1.78               LYMPH % 19.0               Magnesium 1.80               MCH 29.2               MCHC 34.2               MCV 85.3               Mean e'       0.08         Mono # 0.78               Mono % 8.3               MPV 9.8               MV Peak E Alex       1.01         Neut # 6.72               Neut % 72.0               nRBC 0.0               Balderas's Biplane MOD Ejection Fraction       44         Platelets 204               POCT Glucose   187   142           Potassium 4.3               PROTEIN TOTAL 5.7               PV peak gradient       4         PV PEAK VELOCITY       0.96         Posterior Wall       1.20         RBC 4.15               RDW 13.4               RVDD       3.20         Sodium 134               TDI SEPTAL       0.08         TDI LATERAL       0.07         WBC 9.35               ZLVIDD       -5.81         ZLVIDS        -3.18                                 Significant Imaging: I have reviewed and interpreted all pertinent imaging results/findings.     Assessment/Plan:     Active Diagnoses:    Diagnosis Date Noted POA    Open fracture of left distal radius [S52.502B] 08/24/2023 Yes      Problems Resolved During this Admission:     Hgb stable this morning.   NWB LUE. NO wrist ROM due to fusion plate.   Daily dry dressing changes beginning tomorrow as needed.   Aspirin 81 MG BID for DVT ppx upon discharge.   Continue multimodal pain control as needed.  Keep incisions clean and dry.   PT states did well this morning- stable for DC home  She is stable from ortho standpoint as well following last dose of ANCEF.   Follow up with Matt or Dr. Goodwin in office in approx 3 weeks for wound check and repeat x rays. Bridge plate will need removal following 3 month healing period and to allow ROM of the left wrist. Patient is left hand dominant.     The above findings, diagnostics, and treatment plan were discussed with Dr. Goodwin who is in agreement with the plan of care except as stated in additional documentation.      Yoalnda Santamaria PA-C  Orthopedic Trauma Surgery  [facility]

## 2023-08-25 NOTE — PT/OT/SLP EVAL
Physical Therapy Evaluation and Discharge Note    Patient Name:  Lauren Kaba   MRN:  [id number]    Recommendations:     Discharge Recommendations: home  Discharge Equipment Recommendations: cane, straight (encouraged to use cane at home)   Barriers to discharge: None    Assessment:     Lauren Kaba is a 63 y.o. female admitted with a medical diagnosis of fall with distal ulna, radius fx s/p ORIF, a-flutter. Patient ambulating in hallway with OT upon arrival. Patient demonstrated transfers and gait x140ft with supervision, no LOB. Pt with slower gait speed due to orthotic shoe on broken L toe, wider JUANY.  PT educated patient on recommendation to utilize cane for balance upon d/c. PT discussed fall prevention techniques and modifications. Patient in agreement. Pt denies need for further acute PT services. At this time, patient is functioning at their prior level of function and does not require further acute PT services. PT to sign off.     Recent Surgery: Procedure(s) (LRB):  ORIF, FRACTURE, RADIUS (Left) 1 Day Post-Op    Plan:     During this hospitalization, patient does not require further acute PT services.  Please re-consult if situation changes.      Subjective     Chief Complaint: none stated  Patient/Family Comments/goals: to go home  Pain/Comfort:  Pain Rating 1: 0/10    Patients cultural, spiritual, Scientology conflicts given the current situation: no    Living Environment:  Pt lives alone in [facility], but will d/c to family's home with support.  Prior to admission, patients level of function was independent, working, driving.  Equipment used at home: none.  DME owned (not currently used): single point cane.  Upon discharge, patient will have assistance from family.    Objective:     Communicated with RN prior to session.  Patient found ambulatory in room/rajan with blood pressure cuff, peripheral IV, telemetry upon PT entry to room.    General Precautions: Standard, fall    Orthopedic  Precautions:LUE non weight bearing   Braces: N/A  Respiratory Status: Room air    Exams:  RLE ROM: WNL  RLE Strength: WNL  LLE ROM: WNL  LLE Strength: WFL except L broke toes- in orthotic shoe    Functional Mobility:  Transfers:  Sit to Stand:  supervision with no AD  Gait: patient ambulated 140ft with supervision and no AD, decreased gait speed with wider JUANY and lateral lean; pt with orthotic shoe donned on LLE    AM-PAC 6 CLICK MOBILITY  Total Score:23       Treatment and Education:    Patient provided with verbal education regarding PT recommendations for d/c home with family assistance and to utilize cane for balance with ambulation.  Understanding was verbalized.     Patient left sitting edge of bed with all lines intact, call button in reach, and RN notified.    GOALS:   Multidisciplinary Problems       Physical Therapy Goals       Not on file                    History:     Past Medical History:   Diagnosis Date    Personal history of colonic polyps 09/27/2016       Past Surgical History:   Procedure Laterality Date    BREAST SURGERY  11/2013    Reduction    CARDIAC ELECTROPHYSIOLOGY STUDY AND ABLATION      CARDIOVERSION      x 3    COLONOSCOPY W/ POLYPECTOMY  09/27/2016    JOINT REPLACEMENT  5/2013    OPEN REDUCTION AND INTERNAL FIXATION (ORIF) OF FRACTURE OF RADIUS Left 8/24/2023    Procedure: ORIF, FRACTURE, RADIUS;  Surgeon: Keith Goodwin DO;  Location: [facility];  Service: Orthopedics;  Laterality: Left;  Distal Radius. no block, supine hand table c arm skeletal dyamics, wash stuff    TONSILLECTOMY  1989       Time Tracking:     PT Received On: 08/25/23  PT Start Time: 1030     PT Stop Time: 1040  PT Total Time (min): 10 min     Billable Minutes: Evaluation Low complexity, discharge      08/25/2023

## 2023-08-25 NOTE — PLAN OF CARE
08/25/23 1338   Discharge Assessment   Assessment Type Discharge Planning Assessment   Confirmed/corrected address, phone number and insurance Yes   Confirmed Demographics Correct on Facesheet   Source of Information patient   When was your last doctors appointment? 04/14/23   Communicated MONSE with patient/caregiver Yes   Reason For Admission fall at work with arm fracture   People in Home alone   Do you expect to return to your current living situation? Yes   Do you have help at home or someone to help you manage your care at home? Yes   Who are your caregiver(s) and their phone number(s)? sister Ayan Zhang [phone]   Prior to hospitilization cognitive status: Alert/Oriented   Current cognitive status: Alert/Oriented   Home Layout Able to live on 1st floor   Equipment Currently Used at Home other (see comments);CPAP;glucometer  (Has available quad cane, RW)   Readmission within 30 days? No   Patient currently being followed by outpatient case management? No   Do you currently have service(s) that help you manage your care at home? No   Do you take prescription medications? Yes   Do you have any problems affording any of your prescribed medications? No   Is the patient taking medications as prescribed? yes   Who is going to help you get home at discharge? sister Ayan   How do you get to doctors appointments? car, drives self   Are you on dialysis? No   DME Needed Upon Discharge  none   Discharge Plan discussed with: Patient   Transition of Care Barriers None   Discharge Plan A Home with family   Discharge Plan B Home with family   Financial Resource Strain   How hard is it for you to pay for the very basics like food, housing, medical care, and heating? Not hard   Housing Stability   In the last 12 months, was there a time when you were not able to pay the mortgage or rent on time? N   Transportation Needs   In the past 12 months, has lack of transportation kept you from medical appointments or from  getting medications? no   In the past 12 months, has lack of transportation kept you from meetings, work, or from getting things needed for daily living? No   Food Insecurity   Within the past 12 months, you worried that your food would run out before you got the money to buy more. Never true   Within the past 12 months, the food you bought just didn't last and you didn't have money to get more. Never true   Social Connections   In a typical week, how many times do you talk on the phone with family, friends, or neighbors? More than 3   How often do you get together with friends or relatives? Twice   Are you , , , , never , or living with a partner?    Alcohol Use   Q1: How often do you have a drink containing alcohol? Never   Q2: How many drinks containing alcohol do you have on a typical day when you are drinking? None   Q3: How often do you have six or more drinks on one occasion? Never     Pt lives alone, indep in adl's. She states she fell at work ([employer] with [email or web address]) PT states she does not have any info on Workman's comp (name or claim #) but state that someone called her today with her school about the accident and stated she did not realize pt was in hospital and said she would notify hospital of workman's comp case. I message GERMÁN Holliday of above info.  Pt states she will stay with her sister Ayan for a few days who is a NP. Pt states she has a quad cane and RW at home. PT denies any dc needs at this time.

## 2023-08-25 NOTE — PT/OT/SLP EVAL
Occupational Therapy   Evaluation and Discharge Note    Name: Lauren Kaba  MRN: [id number]  Admitting Diagnosis: Open fracture of left distal radius  Recent Surgery: Procedure(s) (LRB):  ORIF, FRACTURE, RADIUS (Left) 1 Day Post-Op    Recommendations:     Discharge Recommendations: home, outpatient OT  Discharge Equipment Recommendations: none  Barriers to discharge:  None    Assessment:     Lauren Kaba is a 63 y.o. female with a medical diagnosis of Open fracture of left distal radius. At this time, patient is functioning at their prior level of function and does not require further acute OT services.     Plan:     During this hospitalization, patient does not require further acute OT services.  Please re-consult if situation changes.    Plan of Care Reviewed with: patient    Subjective     Chief Complaint: soreness in L UE/ wrist  Patient/Family Comments/goals: return home     Occupational Profile:  Living Environment: lives alone with sister close by for assistance as needed, handrails in the bathroom, walk-in shower  Previous level of function: Independent  Roles and Routines: worker, sister, mother  Equipment Used at home: none (Pt reports owning walker and crutches, if ever need)  Assistance upon Discharge: family    Pain/Comfort:  Pain Rating 1:  (Pt reports minimal pain at this time; increased pain and soreness with movement of digits on L hand)  Pain Addressed 1: Distraction    Patients cultural, spiritual, Yazdanism conflicts given the current situation:      Objective:     Communicated with:  Patient found sitting edge of bed with peripheral IV upon OT entry to room.    General Precautions: Standard, fall  Orthopedic Precautions: LUE non weight bearing  Braces: UE brace, UE Sling    Occupational Performance:    Functional Mobility/Transfers:  Patient completed Sit <> Stand Transfer with stand by assistance  with  no assistive device   Functional Mobility: Pt ambulated ~200 ft with  SBA-CGA for safety no AD; pt with small LOB, but able to correct with CGA    Activities of Daily Living:  Grooming: brushing teeth with Mod I (increased time and adaptive techniques to manipulate toothpaste cap)  Don/doff socks/shoes independently     Cognitive/Visual Perceptual:  intact    Physical Exam:  B Ue's are WFL     Therapeutic Positioning  Risk for acquired pressure injuries is decreased due to ability to mobilize independently .      Additional Treatment:  Pt educated on sling wear schedule (for protection during the day PRN) and on P/AROM ex's. Pt performed /P/A/AROM for finger flexion of L IP. DIP, and MCP joints on all digits of L hand. Education provided on importance of completing ex's daily several times a day and using it in function with daily tasks without breaking orthopedic precautions. Pt also provided with foam to squeeze for further ex's to prevent muscular/joint stiffness. Educated on UB tianna-dress techniques. Pt demonstrated and verbalized understanding.     Patient Education:  Patient provided with verbal education regarding post op precautions, fall prevention, and safety awareness.  Understanding was verbalized.     Patient left sitting edge of bed with all lines intact and call button in reach    GOALS:   Multidisciplinary Problems       Occupational Therapy Goals       Not on file                    History:     Past Medical History:   Diagnosis Date    Personal history of colonic polyps 09/27/2016         Past Surgical History:   Procedure Laterality Date    BREAST SURGERY  11/2013    Reduction    CARDIAC ELECTROPHYSIOLOGY STUDY AND ABLATION      CARDIOVERSION      x 3    COLONOSCOPY W/ POLYPECTOMY  09/27/2016    JOINT REPLACEMENT  5/2013    OPEN REDUCTION AND INTERNAL FIXATION (ORIF) OF FRACTURE OF RADIUS Left 8/24/2023    Procedure: ORIF, FRACTURE, RADIUS;  Surgeon: Keith Goodwin DO;  Location: [facility];  Service: Orthopedics;  Laterality: Left;  Distal Radius. no block, supine  hand table c arm skeletal dyamics, wash stuff    TONSILLECTOMY  1989       Time Tracking:     OT Date of Treatment: 08/25/23  OT Start Time: 0946  OT Stop Time: 1020  OT Total Time (min): 34 min    Billable Minutes:Evaluation Low Complexity: 11 min. Self-care 23 8/25/2023

## 2023-08-25 NOTE — DISCHARGE SUMMARY
[facility] - [facility] Tele[facility]  General Surgery  Discharge Summary      Patient Name: Lauren Kaba  MRN: [id number]  Admission Date: 8/23/2023  Hospital Length of Stay: 2 days  Discharge Date and Time:  08/25/2023 12:39 PM  Attending Physician: Melo Mehta Jr., *   Discharging Provider: Adilene Lang PA-C  Primary Care Provider: Roni Mckeon II, MD    HPI:   No notes on file    Procedure(s) (LRB):  ORIF, FRACTURE, RADIUS (Left)      Indwelling Lines/Drains at time of discharge:   Lines/Drains/Airways     None               Hospital Course: 64 y/o female with hx of afib on Eliquis presented after trip and fall resulting in L distal radius and ulnar fractures with a punctuate wound on the ulnar aspect. CT head neg. Pt admitted to trauma with consult to ortho. ORIF of L radius and ulna performed 8/24.  She was in AFib with RVR during her hospital course cardiology was consulted.  She was started on a Cardizem drip now off.  Recommending continuing Eliquis and home sotalol.  Pain controlled.  Tolerating diet.  Worked with therapy.  Orthopedic surgery recommending continued follow up outpatient along with aspirin b.i.d..  Patient meeting discharge criteria.  Patient verbalized understanding of all discharge instructions, new prescription usage, signs and symptoms to be aware of for immediate evaluation, follow up appointments.      Goals of Care Treatment Preferences:  Code Status: Full Code      Consults:   Consults (From admission, onward)        Status Ordering Provider     Inpatient consult to Social Work/Case Management  Once        Provider:  (Not yet assigned)    FLAVIA Bishop     Inpatient consult to Cardiology  Once        Provider:  Toño Solares MD    Completed BEBA SARABIA     Inpatient consult to Orthopedics  Once        Provider:  Keith Goodwin DO    Completed BEBA SARABIA          Significant Diagnostic Studies: N/A    Pending  Diagnostic Studies:     None        Final Active Diagnoses:    Diagnosis Date Noted POA    PRINCIPAL PROBLEM:  Open fracture of left distal radius [S52.502B] 08/24/2023 Yes      Problems Resolved During this Admission:      Discharged Condition: good    Disposition: Home or Self Care    Follow Up:   Follow-up Information     Sukhdev Price MD Follow up.    Specialty: Cardiology  Why: 7-10days post discharge  Contact information:  [address] [place] [address]  [phone]                       Patient Instructions:   No discharge procedures on file.  Medications:  Reconciled Home Medications:      Medication List      START taking these medications    aspirin 81 MG EC tablet  Commonly known as: ECOTRIN  Take 1 tablet (81 mg total) by mouth 2 (two) times a day.     gabapentin 300 MG capsule  Commonly known as: NEURONTIN  Take 1 capsule (300 mg total) by mouth 3 (three) times daily. for 14 days     methocarbamoL 500 MG Tab  Commonly known as: ROBAXIN  Take 1 tablet (500 mg total) by mouth every 8 (eight) hours. for 10 days     oxyCODONE 10 mg Tab immediate release tablet  Commonly known as: ROXICODONE  Take 1 tablet (10 mg total) by mouth every 4 (four) hours as needed for Pain.        CONTINUE taking these medications    apixaban 5 mg Tab  Commonly known as: ELIQUIS  Take 5 mg by mouth 2 (two) times daily.     ascorbic acid (vitamin C) 500 MG tablet  Commonly known as: VITAMIN C  Take 500 mg by mouth.     blood sugar diagnostic Strp  Commonly known as: ACCU-CHEK GUIDE TEST STRIPS  USE TO TEST SUGARS FOUR TIMES A DAY     celecoxib 200 MG capsule  Commonly known as: CeleBREX  TAKE ONE CAPSULE BY MOUTH EVERY DAY     fexofenadine 180 MG tablet  Commonly known as: ALLEGRA  Take 180 mg by mouth.     fluticasone propionate 50 mcg/actuation nasal spray  Commonly known as: FLONASE  1 spray by Nasal route.     GLUCOSAMINE (BULK) MISC     metFORMIN 500 MG ER 24hr tablet  Commonly known as:  GLUCOPHAGE-XR  TAKE ONE TABLET BY MOUTH TWICE A DAY     MULTIVITAMIN ORAL  Take 1 tablet by mouth once daily.     omega 3-dha-epa-fish oil 1,200 (144-216) mg Cap  Take 1 capsule by mouth once daily.     sotaloL 120 MG Tab  Commonly known as: BETAPACE  Take 120 mg by mouth 2 (two) times daily.     TRULICITY 1.5 mg/0.5 mL pen injector  Generic drug: dulaglutide  Inject 1.5 mg into the skin every 7 days.     vitamin D 1000 units Tab  Commonly known as: VITAMIN D3  Take 1,000 Units by mouth.            Adilene Lang PA-C  General Surgery  [facility] - [address] [place] [address]

## 2023-08-25 NOTE — TRAUMA COM
Patient has received home beta-blocker, prn ditch, maxed out on Cardizem.  Remains tachycardic in the 130s consistently.  I have spoken to the nurse and recommended that they call cardiology to ensure the patient was optimized.  We will follow up their recommendations.  Vital signs otherwise stable.

## 2023-08-25 NOTE — HOSPITAL COURSE
62 y/o female with hx of afib on Eliquis presented after trip and fall resulting in L distal radius and ulnar fractures with a punctuate wound on the ulnar aspect. CT head neg. Pt admitted to trauma with consult to ortho. ORIF of L radius and ulna performed 8/24.  She was in AFib with RVR during her hospital course cardiology was consulted.  She was started on a Cardizem drip now off.  Recommending continuing Eliquis and home sotalol.  Pain controlled.  Tolerating diet.  Worked with therapy.  Orthopedic surgery recommending continued follow up outpatient along with aspirin b.i.d..  Patient meeting discharge criteria.  Patient verbalized understanding of all discharge instructions, new prescription usage, signs and symptoms to be aware of for immediate evaluation, follow up appointments.

## 2023-08-25 NOTE — PROGRESS NOTES
[facility] 8th [facility] Med Surg    Cardiology  Progress Note    Patient Name: Lauren Kaba  MRN: [id number]  Admission Date: 8/23/2023  Hospital Length of Stay: 2 days  Code Status: Full Code   Attending Provider: Melo Mehta Jr., *   Consulting Provider: CHASTITY Troncoso  Primary Care Physician: Roni Mckeon II, MD  Principal Problem:<principal problem not specified>    Patient information was obtained from patient, past medical records, and ER records.     Subjective:     Chief Complaint:  afib, RVR  HPI:   Ms. Kaba is a 62 y/o female, followed by Dr. Valentino/Dr. Price, who presented to Ed as trauma 2 post trip with GLF and resultant left arm pain. Workup revealed distal ulna radial fracture on the left with punctate wound on ulnar aspect. CT head negative. She was found to be in Afib, RVR upon ED presentation with improvement in rate with pain control. She does have a hx of Aflutter and has undergone multiple DCCV/ablations and is on Sotalol and eliquis outpatient. Plans are for surgery. [facility] has been consulted for refractory Afib, RVR. She is currently aflutter, 130's. Denies any complaints      Hospital course:  08.25.23; Patient is s/p orIG L radius and ulna. She has converted back to SR. Currently on Cardizem drip. Sotalol has bee resumed.       PMH: typical Aflutter/eliquis/sotalol, breast reduction  PSH: DCCV x 3, Ablations, right knee replacement, tonsillectomy  Family History: Mother- cancer, DM2; Father- arthritis, heart disease  Social History: denies smoking, alcohol, of drug use    Previous Cardiac Diagnostics:   TTE 08.25.23:  Left Ventricle: The left ventricle is normal in size. Mildly increased wall thickness. Unable to assess wall motion. There is mildly reduced systolic function with a visually estimated ejection fraction of 45 - 50%. There is indeterminate diastolic function.    Left Atrium: Left atrium is mildly dilated.    Right Ventricle:  Mild right ventricular enlargement. Systolic function is normal.    Aortic Valve: There is no stenosis. Aortic valve peak velocity is 1.34 m/s. Mean gradient is 4 mmHg. There is no significant regurgitation.    Mitral Valve: There is no stenosis. There is trace regurgitation.    Tricuspid Valve: There is no stenosis. There is mild regurgitation.    Pulmonic Valve: There is no stenosis.    EP study Aflutter ablation with PVI 11.09.22:  Ablation of the following areas was performed: Pulmonary vein isolation   Cavo-tricuspid isthmus (typical atrial flutter), Posterior LA wall   Conduction block was verified across the posterior wall and the CTI.   Conduction block across the mitral isthmus was present from the prior   procedure.     DCCV for typical A Flutter (Dr. Price) 07.25.22:  Single synchronized 200 J biphasic shock successfully converted to SR    Aflutter ablation  06.29.22:  Typical cavo-tricuspid Isthmus dependent Aflutter  Atypical Aflutter  PVI      Review of Systems   Respiratory:  Negative for cough and shortness of breath.    Cardiovascular:  Negative for chest pain and palpitations.   Gastrointestinal: Negative.    Genitourinary: Negative.    Musculoskeletal:         Left wrist fracture   Neurological: Negative.        Objective:     Vital Signs (Most Recent):  Temp: 98.3 °F (36.8 °C) (08/25/23 0619)  Pulse: 94 (08/25/23 0619)  Resp: 19 (08/24/23 1900)  BP: 103/61 (08/25/23 0619)  SpO2: (!) 93 % (08/25/23 0619) Vital Signs (24h Range):  Temp:  [97.7 °F (36.5 °C)-98.3 °F (36.8 °C)] 98.3 °F (36.8 °C)  Pulse:  [] 94  Resp:  [12-19] 19  SpO2:  [93 %-97 %] 93 %  BP: ()/() 103/61     Weight: 108.9 kg (240 lb)  Body mass index is 41.18 kg/m².    SpO2: (!) 93 %         Intake/Output Summary (Last 24 hours) at 8/25/2023 1007  Last data filed at 8/24/2023 1829  Gross per 24 hour   Intake --   Output 600 ml   Net -600 ml         Lines/Drains/Airways       Peripheral Intravenous Line  Duration                   Peripheral IV - Single Lumen 08/23/23 2009 20 G Anterior;Right Forearm 1 day         Peripheral IV - Single Lumen 08/23/23 2009 20 G Posterior;Right Hand 1 day                    Significant Labs:  Recent Results (from the past 72 hour(s))   Comprehensive metabolic panel    Collection Time: 08/23/23  9:13 PM   Result Value Ref Range    Sodium Level 141 136 - 145 mmol/L    Potassium Level 3.5 3.5 - 5.1 mmol/L    Chloride 105 98 - 107 mmol/L    Carbon Dioxide 24 23 - 31 mmol/L    Glucose Level 156 (H) 82 - 115 mg/dL    Blood Urea Nitrogen 21.1 (H) 9.8 - 20.1 mg/dL    Creatinine 0.69 0.55 - 1.02 mg/dL    Calcium Level Total 9.6 8.4 - 10.2 mg/dL    Protein Total 6.5 5.8 - 7.6 gm/dL    Albumin Level 3.9 3.4 - 4.8 g/dL    Globulin 2.6 2.4 - 3.5 gm/dL    Albumin/Globulin Ratio 1.5 1.1 - 2.0 ratio    Bilirubin Total 1.0 <=1.5 mg/dL    Alkaline Phosphatase 96 40 - 150 unit/L    Alanine Aminotransferase 64 (H) 0 - 55 unit/L    Aspartate Aminotransferase 52 (H) 5 - 34 unit/L    eGFR >60 mls/min/1.73/m2   Protime-INR    Collection Time: 08/23/23  9:13 PM   Result Value Ref Range    PT 13.1 12.5 - 14.5 seconds    INR 1.0 <=1.3   APTT    Collection Time: 08/23/23  9:13 PM   Result Value Ref Range    PTT 20.5 (L) 23.2 - 33.7 seconds   CBC with Differential    Collection Time: 08/23/23  9:13 PM   Result Value Ref Range    WBC 9.89 4.50 - 11.50 x10(3)/mcL    RBC 4.45 4.20 - 5.40 x10(6)/mcL    Hgb 13.0 12.0 - 16.0 g/dL    Hct 38.1 37.0 - 47.0 %    MCV 85.6 80.0 - 94.0 fL    MCH 29.2 27.0 - 31.0 pg    MCHC 34.1 33.0 - 36.0 g/dL    RDW 13.4 11.5 - 17.0 %    Platelet 168 130 - 400 x10(3)/mcL    MPV 9.1 7.4 - 10.4 fL    Neut % 66.9 %    Lymph % 21.2 %    Mono % 8.6 %    Eos % 2.5 %    Basophil % 0.5 %    Lymph # 2.10 0.6 - 4.6 x10(3)/mcL    Neut # 6.61 2.1 - 9.2 x10(3)/mcL    Mono # 0.85 0.1 - 1.3 x10(3)/mcL    Eos # 0.25 0 - 0.9 x10(3)/mcL    Baso # 0.05 <=0.2 x10(3)/mcL    IG# 0.03 0 - 0.04 x10(3)/mcL    IG% 0.3 %    NRBC%  0.0 %   Magnesium    Collection Time: 08/24/23  4:17 AM   Result Value Ref Range    Magnesium Level 1.40 (L) 1.60 - 2.60 mg/dL   Phosphorus    Collection Time: 08/24/23  4:17 AM   Result Value Ref Range    Phosphorus Level 3.5 2.3 - 4.7 mg/dL   Comprehensive metabolic panel    Collection Time: 08/24/23  4:17 AM   Result Value Ref Range    Sodium Level 138 136 - 145 mmol/L    Potassium Level 3.7 3.5 - 5.1 mmol/L    Chloride 104 98 - 107 mmol/L    Carbon Dioxide 25 23 - 31 mmol/L    Glucose Level 167 (H) 82 - 115 mg/dL    Blood Urea Nitrogen 15.9 9.8 - 20.1 mg/dL    Creatinine 0.63 0.55 - 1.02 mg/dL    Calcium Level Total 9.0 8.4 - 10.2 mg/dL    Protein Total 6.2 5.8 - 7.6 gm/dL    Albumin Level 3.6 3.4 - 4.8 g/dL    Globulin 2.6 2.4 - 3.5 gm/dL    Albumin/Globulin Ratio 1.4 1.1 - 2.0 ratio    Bilirubin Total 1.0 <=1.5 mg/dL    Alkaline Phosphatase 82 40 - 150 unit/L    Alanine Aminotransferase 57 (H) 0 - 55 unit/L    Aspartate Aminotransferase 47 (H) 5 - 34 unit/L    eGFR >60 mls/min/1.73/m2   C-reactive protein    Collection Time: 08/24/23  4:17 AM   Result Value Ref Range    C-Reactive Protein 1.80 <5.00 mg/L   Prealbumin    Collection Time: 08/24/23  4:17 AM   Result Value Ref Range    Prealbumin 23.6 14.0 - 37.0 mg/dL   CBC with Differential    Collection Time: 08/24/23  4:17 AM   Result Value Ref Range    WBC 9.05 4.50 - 11.50 x10(3)/mcL    RBC 4.14 (L) 4.20 - 5.40 x10(6)/mcL    Hgb 11.9 (L) 12.0 - 16.0 g/dL    Hct 35.7 (L) 37.0 - 47.0 %    MCV 86.2 80.0 - 94.0 fL    MCH 28.7 27.0 - 31.0 pg    MCHC 33.3 33.0 - 36.0 g/dL    RDW 14.1 11.5 - 17.0 %    Platelet 188 130 - 400 x10(3)/mcL    MPV 10.0 7.4 - 10.4 fL    Neut % 68.8 %    Lymph % 20.7 %    Mono % 8.5 %    Eos % 1.1 %    Basophil % 0.7 %    Lymph # 1.87 0.6 - 4.6 x10(3)/mcL    Neut # 6.23 2.1 - 9.2 x10(3)/mcL    Mono # 0.77 0.1 - 1.3 x10(3)/mcL    Eos # 0.10 0 - 0.9 x10(3)/mcL    Baso # 0.06 <=0.2 x10(3)/mcL    IG# 0.02 0 - 0.04 x10(3)/mcL    IG% 0.2 %     NRBC% 0.0 %   POCT glucose    Collection Time: 08/24/23  6:07 AM   Result Value Ref Range    POCT Glucose 168 (H) 70 - 110 mg/dL   Type & Screen    Collection Time: 08/24/23  8:03 AM   Result Value Ref Range    Group & Rh O POS     Indirect Andre GEL NEG     Specimen Outdate 08/27/2023 23:59    ABORH RETYPE    Collection Time: 08/24/23  9:11 AM   Result Value Ref Range    ABORH Retype O POS    Echo    Collection Time: 08/24/23 10:41 AM   Result Value Ref Range    BSA 2.22 m2    Balderas's Biplane MOD Ejection Fraction 44 %    LVIDd 3.70 3.5 - 6.0 cm    LV Systolic Volume 27.00 mL    LV Systolic Volume Index 12.8 mL/m2    LVIDs 2.70 2.1 - 4.0 cm    LV Diastolic Volume 58.10 mL    LV Diastolic Volume Index 27.54 mL/m2    IVS 1.40 (A) 0.6 - 1.1 cm    FS 27 (A) 28 - 44 %    Left Ventricle Relative Wall Thickness 0.65 cm    Posterior Wall 1.20 (A) 0.6 - 1.1 cm    LV mass 166.50 g    LV Mass Index 79 g/m2    MV Peak E Alex 1.01 m/s    TDI LATERAL 0.07 m/s    TDI SEPTAL 0.08 m/s    E/E' ratio 13.47 m/s    E wave deceleration time 103.00 msec    LV SEPTAL E/E' RATIO 12.63 m/s    LV LATERAL E/E' RATIO 14.43 m/s    LVOT peak alex 0.93 m/s    Left Ventricular Outflow Tract Mean Velocity 0.65 cm/s    Left Ventricular Outflow Tract Mean Gradient 2.00 mmHg    LA volume (mod) 40.60 cm3    LA Volume Index (Mod) 19.2 mL/m2    LA size 3.10 cm    RVDD 3.20 cm    AV mean gradient 4 mmHg    AV peak gradient 7 mmHg    Ao peak alex 1.34 m/s    Ao VTI 20.90 cm    LVOT peak VTI 12.10 cm    AV Velocity Ratio 0.69     AV index (prosthetic) 0.58     PV PEAK VELOCITY 0.96 m/s    PV peak gradient 4 mmHg    Mean e' 0.08 m/s    ZLVIDS -3.18     ZLVIDD -5.81    POCT glucose    Collection Time: 08/24/23  2:53 PM   Result Value Ref Range    POCT Glucose 142 (H) 70 - 110 mg/dL   POCT glucose    Collection Time: 08/24/23  8:45 PM   Result Value Ref Range    POCT Glucose 187 (H) 70 - 110 mg/dL   Comprehensive metabolic panel    Collection Time: 08/25/23   5:49 AM   Result Value Ref Range    Sodium Level 134 (L) 136 - 145 mmol/L    Potassium Level 4.3 3.5 - 5.1 mmol/L    Chloride 101 98 - 107 mmol/L    Carbon Dioxide 23 23 - 31 mmol/L    Glucose Level 170 (H) 82 - 115 mg/dL    Blood Urea Nitrogen 13.7 9.8 - 20.1 mg/dL    Creatinine 0.59 0.55 - 1.02 mg/dL    Calcium Level Total 8.2 (L) 8.4 - 10.2 mg/dL    Protein Total 5.7 (L) 5.8 - 7.6 gm/dL    Albumin Level 3.3 (L) 3.4 - 4.8 g/dL    Globulin 2.4 2.4 - 3.5 gm/dL    Albumin/Globulin Ratio 1.4 1.1 - 2.0 ratio    Bilirubin Total 1.4 <=1.5 mg/dL    Alkaline Phosphatase 61 40 - 150 unit/L    Alanine Aminotransferase 43 0 - 55 unit/L    Aspartate Aminotransferase 33 5 - 34 unit/L    eGFR >60 mls/min/1.73/m2   CBC with Differential    Collection Time: 08/25/23  5:49 AM   Result Value Ref Range    WBC 9.35 4.50 - 11.50 x10(3)/mcL    RBC 4.15 (L) 4.20 - 5.40 x10(6)/mcL    Hgb 12.1 12.0 - 16.0 g/dL    Hct 35.4 (L) 37.0 - 47.0 %    MCV 85.3 80.0 - 94.0 fL    MCH 29.2 27.0 - 31.0 pg    MCHC 34.2 33.0 - 36.0 g/dL    RDW 13.4 11.5 - 17.0 %    Platelet 204 130 - 400 x10(3)/mcL    MPV 9.8 7.4 - 10.4 fL    Neut % 72.0 %    Lymph % 19.0 %    Mono % 8.3 %    Eos % 0.1 %    Basophil % 0.1 %    Lymph # 1.78 0.6 - 4.6 x10(3)/mcL    Neut # 6.72 2.1 - 9.2 x10(3)/mcL    Mono # 0.78 0.1 - 1.3 x10(3)/mcL    Eos # 0.01 0 - 0.9 x10(3)/mcL    Baso # 0.01 <=0.2 x10(3)/mcL    IG# 0.05 (H) 0 - 0.04 x10(3)/mcL    IG% 0.5 %    NRBC% 0.0 %       Significant Imaging:  Imaging Results              X-Ray Pelvis Routine AP (Final result)  Result time 08/23/23 22:12:13      Final result by Gilberto Claudio MD (08/23/23 22:12:13)                   Impression:      Limited study, fracture is not demonstrated      Electronically signed by: Gilberto Claudio MD  Date:    08/23/2023  Time:    22:12               Narrative:    EXAMINATION:  XR PELVIS ROUTINE AP    CLINICAL HISTORY:  trauma;    TECHNIQUE:  AP view of the pelvis was  performed.    COMPARISON:  09/19/2017    FINDINGS:  There are no acute fractures seen.  The film is under penetrated.  There is no dislocation.                                       X-Ray Chest AP Portable (Final result)  Result time 08/23/23 22:12:46      Final result by Gilberto Claudio MD (08/23/23 22:12:46)                   Impression:      Cardiomegaly, no acute disease is seen      Electronically signed by: Gilberto Claudio MD  Date:    08/23/2023  Time:    22:12               Narrative:    EXAMINATION:  XR CHEST AP PORTABLE    CLINICAL HISTORY:  Injury, unspecified, initial encounter    TECHNIQUE:  Single frontal view of the chest was performed.    COMPARISON:  None    FINDINGS:  No infiltrates are seen.  Heart is enlarged.  Costophrenic angles are clear.  There is vascular calcification noted.                                       X-Ray Wrist 2 View Left (Final result)  Result time 08/23/23 21:40:40      Final result by David Samson MD (08/23/23 21:40:40)                   Impression:      Interval improved alignment of distal radius and ulnar fractures.      Electronically signed by: David Samson MD  Date:    08/23/2023  Time:    21:40               Narrative:    EXAMINATION:  Left wrist two views    CLINICAL HISTORY:  Postreduction    COMPARISON:  Same day    FINDINGS:  Overlying cast noted.  There has been interval reduction with improved alignment of comminuted distal radius and ulnar fractures.                                       CT 3D RECON WITH INDEPENDENT WS (Final result)  Result time 08/24/23 13:22:20      Final result by Maine Flores MD (08/24/23 13:22:20)                   Narrative:    EXAMINATION:  CT 3D RECON WITH INDEPENDENT WS    CLINICAL HISTORY:  FRACTURE;    TECHNIQUE:  3D volumetric rotating reconstructions of the bony wrist were created using the axial data set from CT wrist 08/23/2023.    COMPARISON:  None    FINDINGS:  See report associated with accession number  [id number] for findings.      Electronically signed by: Maine Flores  Date:    08/24/2023  Time:    13:22                                     X-Ray Wrist Complete Left (Final result)  Result time 08/23/23 21:36:34      Final result by David Samson MD (08/23/23 21:36:34)                   Impression:      Fracture of the distal radius and ulna      Electronically signed by: David Samson MD  Date:    08/23/2023  Time:    21:36               Narrative:    EXAMINATION:  Left wrist three views    CLINICAL HISTORY:  Trauma, injury    COMPARISON:  None    FINDINGS:  There is comminuted, displaced, impacted intra-articular fracture of the distal radius.  There is comminuted angulated and displaced fracture of the distal ulna.                                       CT Head Without Contrast (Final result)  Result time 08/24/23 10:18:24      Final result by Inez Arizmendi MD (08/24/23 10:18:24)                   Impression:      No acute intracranial abnormality.    [facility] significant [facility].      Electronically signed by: Inez Arizmendi  Date:    08/24/2023  Time:    10:18               Narrative:    EXAMINATION:  CT HEAD WITHOUT CONTRAST    TECHNIQUE:  Axial scans were obtained from skull base to the vertex.    Coronal and sagittal reconstructions obtained from the axial data.    Automatic exposure control was utilized to limit radiation dose.    Contrast: None    Radiation Dose:    Total DLP: 12 90 mGy*cm    COMPARISON:  CT head dated 10/03/2022    FINDINGS:  There is no acute intracranial hemorrhage or edema. The gray-white matter differentiation is preserved.    There is no mass effect or midline shift. The ventricles and sulci are normal in size. The basal cisterns are patent. There is no abnormal extra-axial fluid collection.    The calvarium and skull base are intact. The visualized paranasal sinuses and the mastoid air cells are clear.                        Preliminary  result by Inez Arizmendi MD (08/23/23 21:04:51)                   Narrative:    START OF REPORT:  TECHNIQUE: CT OF THE HEAD WAS PERFORMED WITHOUT INTRAVENOUS CONTRAST WITH AXIAL AS WELL AS CORONAL AND SAGITTAL IMAGES.    COMPARISON: NONE.    DOSAGE INFORMATION: AUTOMATED EXPOSURE CONTROL WAS UTILIZED.    CLINICAL HISTORY: TRAUMA LEVEL 2, FALL ON THINNERS, FACIAL TRAUMA.    Findings:  Hemorrhage: No acute intracranial hemorrhage is seen.  CSF spaces: The ventricles sulci and basal cisterns are within normal limits.  Brain parenchyma: Unremarkable with preservation of the grey white junction throughout.  Cerebellum: Unremarkable.  Vascular: Mild atheromatous calcification of the intracranial arteries is seen.  Sella and skull base: The sella appears to be within normal limits for age.  Intracranial calcifications: Incidental note is made of bilateral choroid plexus calcification. Incidental note is made of some pineal region calcification.  Calvarium: No acute linear or depressed skull fracture is seen.    Maxillofacial Structures: Maxillofacial findings discussed separately in the maxillofacial CT report.      Impression:  1. No acute intracranial traumatic injury identified. Details and other findings as noted above.                                         CT Wrist Without Contrast Left (Final result)  Result time 08/24/23 11:00:36      Final result by Thomas Vang MD (08/24/23 11:00:36)                   Impression:      A highly comminuted intra-articular fracture is present to the distal radius with 100% dorsal displacement of a dorsal fracture fragment, loss of volar tilt, and gross instability of the distal radioulnar joint.    A comminuted intra-articular fracture is present to the distal ulna and ulnar styloid.    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      Electronically signed by: Thomas Vang  Date:    08/24/2023  Time:    11:00                Narrative:    EXAMINATION:  CT WRIST WITHOUT CONTRAST LEFT    CLINICAL HISTORY:  Wrist fracture    TECHNIQUE:  Unenhanced axial images of the hand and wrist were obtained along with coronal and sagittal reconstructions.  Automatic exposure control was used to reduce radiation exposure to the patient.    COMPARISON:  Radiographs of the left wrist used for comparison dated 08/03/2023    FINDINGS:  A highly comminuted intra-articular fracture is present to the distal radius with 100% dorsal displacement of a dorsal fracture fragment, loss of volar tilt, and preservation of radial inclination.  A comminuted intra-articular fracture is present to the distal ulna and ulnar styloid.  The distal radioulnar joint is likely unstable.    The hook of the hamate is intact.  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  The midcarpal joint is preserved.  The carpal metacarpal joints are preserved.    The study is not optimized to evaluate the soft tissues however the tendons appear grossly intact at the level of the distal radioulnar joint.  Intrinsic muscles are unremarkable.  Marked subcutaneous edema is present about the wrist.                        Preliminary result by Thomas Vang MD (08/23/23 21:02:33)                   Narrative:    START OF REPORT:  TECHNIQUE: CT OF THE UPPER EXTREMITY WAS PERFORMED WITHOUT INTRAVENOUS CONTRAST.    COMPARISON: NONE.    CLINICAL HISTORY: TRAUMA LEVEL 2, FALL ON THINNERS, FACIAL TRAUMA, LEFT WRIST TRAUMA.    Findings:  Wrist: Comminuted fracture of the distal radius and ulna is seen. The distal radial fracture shows slight impaction with intra-articular extension. Chip fracture of the ulnar styloid is also demonstrated. An incomplete fracture is noted in the lunate with a horizontal fracture line noted ventrally. Subcortical cysts are also noted in the lunate bone. Soft tissue swelling is noted in the wrist. The rest of the osseous structures  are intact. Widened radiocarpal joint implies ligamentous injury.      Impression:  1. Comminuted fracture of the distal radius and ulna is seen. The distal radial fracture shows slight impaction with intra-articular extension. Chip fracture of the ulnar styloid is also demonstrated. An incomplete fracture is noted in the lunate with a horizontal fracture line noted ventrally. Widened radiocarpal joint implies ligamentous injury.  2. Other details and findings as discussed above.                                         CT Maxillofacial Without Contrast (Final result)  Result time 08/24/23 10:20:15      Final result by Inez Arizmendi MD (08/24/23 10:20:15)                   Impression:      No acute fracture identified.    No significant change from the [facility] interpretation.      Electronically signed by: Inez Arizmendi  Date:    08/24/2023  Time:    10:20               Narrative:    EXAMINATION:  CT MAXILLOFACIAL WITHOUT CONTRAST    CLINICAL HISTORY:  Facial trauma, blunt;    TECHNIQUE:  Volumetric CT acquisition of the facial bones without contrast. Axial, coronal and sagittal reconstructions.    Automatic exposure control was utilized to limit radiation dose.    DLP: 666 mGy-cm    COMPARISON:  None    FINDINGS:  There is no acute fracture identified.  The paranasal sinuses and mastoid air cells are clear.  The orbits and soft tissues are unremarkable.                        Preliminary result by Inez Arizmendi MD (08/23/23 21:01:29)                   Narrative:    START OF REPORT:  TECHNIQUE: MAXILLOFACIAL CT WAS PERFORMED WITH INTRAVENOUS CONTRAST WITH AXIAL AS WELL AS SAGITTAL AND CORONAL IMAGES.    COMPARISON: NONE.    CLINICAL HISTORY: TRAUMA LEVEL 2, FALL ON THINNERS, FACIAL TRAUMA.    Findings:  Facial soft tissues: Unremarkable.  Orbital soft tissues: The orbital soft tissues appear unremarkable.  Bones:  Orbital bony structures: The bilateral orbital bony structures are intact with no  orbital fracture identified.  Mandible: The mandible appears unremarkable.  Maxilla: The maxilla appears unremarkable.  Zygoma: The zygomatic arches are intact.  TMJ: The mandibular condyles appear normally placed with respect to the mandibular fossa.  Nasal Bones: No displaced nasal bone fracture is seen. Mild rightward deviation of the nasal septum is seen.  Skull: No acute linear or depressed fracture is identified in the visualized skull.  Paranasal sinuses: The visualized paranasal sinuses appear clear with no significant mucoperiosteal thickening or air fluid levels identified.  Mastoid air cells: The visualized mastoid air cells appear clear.  Brain: Intracranial findings discussed separately.      Impression:  1. No acute maxillofacial fracture identified. Details and findings as noted above.                                      Telemetry:  Aflutter 90s-- converted SR     Physical Exam  Constitutional:       Appearance: She is obese.   HENT:      Mouth/Throat:      Mouth: Mucous membranes are moist.   Cardiovascular:      Rate and Rhythm: Normal rate and regular rhythm.      Pulses: Normal pulses.      Heart sounds: Normal heart sounds.   Pulmonary:      Effort: Pulmonary effort is normal.      Breath sounds: Normal breath sounds.   Abdominal:      Palpations: Abdomen is soft.   Musculoskeletal:         General: Normal range of motion.      Comments: Brace to left arm   Neurological:      General: No focal deficit present.      Mental Status: She is alert.   Psychiatric:         Mood and Affect: Mood normal.       Home Medications:   No current facility-administered medications on file prior to encounter.     Current Outpatient Medications on File Prior to Encounter   Medication Sig Dispense Refill    apixaban (ELIQUIS) 5 mg Tab Take 5 mg by mouth 2 (two) times daily.      ascorbic acid, vitamin C, (VITAMIN C) 500 MG tablet Take 500 mg by mouth.      blood sugar diagnostic (ACCU-CHEK GUIDE TEST STRIPS) Strp  USE TO TEST SUGARS FOUR TIMES A  strip 11    celecoxib (CELEBREX) 200 MG capsule TAKE ONE CAPSULE BY MOUTH EVERY DAY 30 capsule 5    dulaglutide (TRULICITY) 1.5 mg/0.5 mL pen injector Inject 1.5 mg into the skin every 7 days. 4 pen 5    fexofenadine (ALLEGRA) 180 MG tablet Take 180 mg by mouth.      fluticasone propionate (FLONASE) 50 mcg/actuation nasal spray 1 spray by Nasal route.      glucosamine HCl (GLUCOSAMINE, BULK, MISC)       metFORMIN (GLUCOPHAGE-XR) 500 MG ER 24hr tablet TAKE ONE TABLET BY MOUTH TWICE A DAY 60 tablet 6    MULTIVITAMIN ORAL Take 1 tablet by mouth once daily.      omega 3-dha-epa-fish oil 1,200 (144-216) mg Cap Take 1 capsule by mouth once daily.      sotaloL (BETAPACE) 120 MG Tab Take 120 mg by mouth 2 (two) times daily.      vitamin D (VITAMIN D3) 1000 units Tab Take 1,000 Units by mouth.         Current Inpatient Medications:    Current Facility-Administered Medications:     acetaminophen tablet 650 mg, 650 mg, Oral, Q4H, Janak Jerez FNP, 650 mg at 08/24/23 1149    ascorbic acid (vitamin C) tablet 500 mg, 500 mg, Oral, Daily, Janak Jerez, BROOKP, 500 mg at 08/25/23 0838    cefazolin (ANCEF) 2 gram in dextrose 5% 50 mL IVPB (premix), 2 g, Intravenous, Q8H, Janak Jerez FNP, Stopped at 08/25/23 0645    celecoxib capsule 200 mg, 200 mg, Oral, Daily, Janak Jerez FNP, 200 mg at 08/25/23 0839    cetirizine tablet 10 mg, 10 mg, Oral, Daily, Janak Jerez, FNP, 10 mg at 08/25/23 0839    dextrose 10% bolus 125 mL 125 mL, 12.5 g, Intravenous, PRN, Janak Jerez, FNP    dextrose 10% bolus 250 mL 250 mL, 25 g, Intravenous, PRN, Janak Jerez, FNP    diltiaZEM 125 mg in dextrose 5 % 125 mL IVPB (ready to mix) (titrating), 0-15 mg/hr, Intravenous, Continuous, Asad Rangel Q., NP, Last Rate: 10 mL/hr at 08/25/23 0720, 10 mg/hr at 08/25/23 0720    docusate sodium capsule 100 mg, 100 mg, Oral, BID, Janak Jerez, FNP, 100 mg at 08/25/23  0839    enoxaparin injection 40 mg, 40 mg, Subcutaneous, Q12H, Janak Jerez, FNP, 40 mg at 08/25/23 0839    fluticasone propionate 50 mcg/actuation nasal spray 50 mcg, 1 spray, Each Nostril, Daily, Janak Jerez, CHASTITY    gabapentin capsule 300 mg, 300 mg, Oral, TID, Janak Jerez, FNP, 300 mg at 08/25/23 0839    glucagon (human recombinant) injection 1 mg, 1 mg, Intramuscular, PRN, Janak Jerez, CHASTITY    glucose chewable tablet 16 g, 16 g, Oral, PRN, Janak Jerez, CHASTITY    glucose chewable tablet 24 g, 24 g, Oral, PRN, Janak Jerez, CHASTITY    insulin aspart U-100 injection 0-5 Units, 0-5 Units, Subcutaneous, QID (AC + HS) PRN, Janka Jerez, FNPAUL    lactated ringers infusion, , Intravenous, Continuous, Janak Jerez FNP, Last Rate: 100 mL/hr at 08/24/23 2033, New Bag at 08/24/23 2033    magnesium hydroxide 400 mg/5 ml suspension 2,400 mg, 30 mL, Oral, Daily PRN, Janak Jerez FNP    melatonin tablet 6 mg, 6 mg, Oral, Nightly PRN, Janak Jerez, CHASTITY    methocarbamoL tablet 500 mg, 500 mg, Oral, Q8H, Janak Jerez, FNP, 500 mg at 08/24/23 1730    metoprolol injection 5 mg, 5 mg, Intravenous, Q5 Min PRN, Janak Jerez, FNP, 5 mg at 08/24/23 1455    morphine injection 4 mg, 4 mg, Intravenous, Q6H PRN, Yolanda Santamaria PA-C    multivitamin tablet, 1 tablet, Oral, Daily, Janak Jerez, BROOKP, 1 tablet at 08/25/23 0839    omega 3-dha-epa-fish oil 1,000 mg (120 mg-180 mg) Cap 1 capsule, 1 capsule, Oral, Daily, Janak Jerez, FNP    ondansetron injection 4 mg, 4 mg, Intravenous, Q6H PRN, Keith Goodwin DO, 4 mg at 08/24/23 0738    oxyCODONE immediate release tablet 5 mg, 5 mg, Oral, Q4H PRN, Janak Jerez, CHASTITY    oxyCODONE immediate release tablet Tab 10 mg, 10 mg, Oral, Q4H PRN, Janak Jerez, CHASTITY, 10 mg at 08/24/23 2667    polyethylene glycol packet 17 g, 17 g, Oral, BID, Janak Jerez, CHASTITY    sotaloL tablet 120 mg, 120  mg, Oral, BID, Janak Jerez, CHASTITY, 120 mg at 08/25/23 0839    vitamin D 1000 units tablet 1,000 Units, 1,000 Units, Oral, Daily, Janak Jerez FNP, 1,000 Units at 08/25/23 0839         VTE Risk Mitigation (From admission, onward)           Ordered     enoxaparin injection 40 mg  Every 12 hours         08/23/23 2118     IP VTE HIGH RISK PATIENT  Once         08/23/23 2118     Place sequential compression device  Until discontinued         08/23/23 2118                    Assessment:   GLF  Distal Ulna radius fracture with punctate wound on ulnar aspect  --s/p ORIF radius and ulnar fracture 8/25/23  pAflutter, RVR  --hx A flutter ablation 6/22 & 11/22  --Hx DCCV x 3- most recent 07.22  --CHADS VASC score 2 ( on eliquis outpatient)  --converted to SR 8/25/23  Morbid obesity- BMI 41  Hypomagnesemia (1.4)    Plan:   Discussed with Dr. Preciado. Resume sotalol 120 mg bid. DC Cardizem drip  Resume Eliquis 5 mg bid upon discharge  Recheck Mg level    CIS signing off. F/U with Dr. Price 7-10 days post discharge    CHASTITY Troncoso  Cardiology  [facility] - 8th Floor Med Surg  08/25/2023

## 2023-08-28 ENCOUNTER — PATIENT OUTREACH (OUTPATIENT)
Dept: ADMINISTRATIVE | Facility: CLINIC | Age: 63
End: 2023-08-28
Payer: COMMERCIAL

## 2023-08-28 NOTE — PROGRESS NOTES
C3 nurse spoke with Lauren Kaba for a TCC post hospital discharge follow up call. The patient does not have a scheduled HOSFU appointment with Roni Mckeon II, MD within 5-7 days post hospital discharge date 8/25/23. C3 nurse was unable to schedule HOSFU appointment in [facility].  Patient does have a follow up appointment with Keith Goodwin DO (Orthopedic Surgery )9/18/23 @ 10:00.    Message sent to PCP staff requesting they contact patient and schedule follow up appointment.

## 2023-08-29 LAB
LEFT EYE DM RETINOPATHY: NEGATIVE
RIGHT EYE DM RETINOPATHY: NEGATIVE

## 2023-08-29 NOTE — PHYSICIAN QUERY
"PT Name: Lauren Kaba  MR #: [id number]    DOCUMENTATION CLARIFICATION      CDI : Naye Narvaez RN, CDS              Contact information: [email or web address]   This form is a permanent document in the medical record.    Query Date: August 29, 2023  By submitting this query, we are merely seeking further clarification of documentation. Please utilize your independent clinical judgment when addressing the question(s) below.    The Medical Record contains the following:   Indicator Supporting Clinical Findings Location in Medical Record   x Documentation of "Debridement" -Left open distal radius fracture, Left distal ulna shaft fracture  - Excisional debridement open fracture  left distal radius    -Patient had a 3 mm poke hole on the volar wrist.  Extended this incision proximally and distally and then copiously irrigated the wound completed excisional debridement of open fracture 15 blade and a curette.  We removed the soft tissue and necrotic muscle. OP note 8/24    Documentation of "I&D"      Other       Excisional debridement is the surgical removal or cutting away of such tissue, necrosis, or slough and is classified to the root operation "Excision." Use of a sharp instrument does not always indicate that an excisional debridement was performed. Minor removal of loose fragments with scissors or using a sharp instrument to scrape away tissue is not an excisional debridement. Excisional debridement involves the use of a scalpel to remove devitalized tissue.  Nonexcisional debridement is the nonoperative brushing, irrigating, scrubbing, or washing of devitalized tissue, necrosis, slough, or foreign material. Most nonexcisional debridement procedures are classified to the root operation "Extraction" (pulling or stripping out or off all or a portion of a body part by the use of force).     Provider, please provide further clarification on the procedure performed on __the Left distal radius poke hole injury  "     :    [x   ] Excisional Debridement of muscle   [   ] Excisional Debridement of bone        [   ] Other Procedure (please specify): _____________     Reference:    ICD-10-CM/PCS Coding Clinic Third Quarter ICD-10, Effective with discharges: October 7, 2015 [facility] [facility] [employer] § Excisional and nonexcisional debridement (2015).    Form No. [id number]

## 2023-08-31 ENCOUNTER — TELEPHONE (OUTPATIENT)
Dept: INTERNAL MEDICINE | Facility: CLINIC | Age: 63
End: 2023-08-31
Payer: COMMERCIAL

## 2023-08-31 NOTE — TELEPHONE ENCOUNTER
----- Message from Gomez Rudolph LPN sent at 8/31/2023  7:16 AM CDT -----  Regarding: jonnathan shen 9/7 @10  Are there any outstanding tasks in patient chart?no    Is there documentation of outstanding tasks in patient chart?no    Has patient been to the ER, urgent care, or another physician since last visit?    Has patient done any blood work or x-rays since last visit?

## 2023-09-05 ENCOUNTER — TELEPHONE (OUTPATIENT)
Dept: INTERNAL MEDICINE | Facility: CLINIC | Age: 63
End: 2023-09-05
Payer: COMMERCIAL

## 2023-09-05 DIAGNOSIS — E11.9 TYPE 2 DIABETES MELLITUS WITHOUT COMPLICATION, UNSPECIFIED WHETHER LONG TERM INSULIN USE: ICD-10-CM

## 2023-09-05 RX ORDER — DULAGLUTIDE 1.5 MG/.5ML
1.5 INJECTION, SOLUTION SUBCUTANEOUS
Qty: 4 PEN | Refills: 5 | Status: SHIPPED | OUTPATIENT
Start: 2023-09-05 | End: 2024-02-09 | Stop reason: SDUPTHER

## 2023-09-07 ENCOUNTER — PATIENT MESSAGE (OUTPATIENT)
Dept: RESEARCH | Facility: HOSPITAL | Age: 63
End: 2023-09-07
Payer: COMMERCIAL

## 2023-09-07 ENCOUNTER — OFFICE VISIT (OUTPATIENT)
Dept: INTERNAL MEDICINE | Facility: CLINIC | Age: 63
End: 2023-09-07
Payer: OTHER MISCELLANEOUS

## 2023-09-07 VITALS
RESPIRATION RATE: 18 BRPM | DIASTOLIC BLOOD PRESSURE: 68 MMHG | WEIGHT: 243 LBS | SYSTOLIC BLOOD PRESSURE: 124 MMHG | HEIGHT: 64 IN | OXYGEN SATURATION: 97 % | HEART RATE: 78 BPM | BODY MASS INDEX: 41.48 KG/M2
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
## 2023-09-07 DIAGNOSIS — S52.502B TYPE I OR II OPEN FRACTURE OF DISTAL END OF LEFT RADIUS, UNSPECIFIED FRACTURE MORPHOLOGY, INITIAL ENCOUNTER: Primary | ICD-10-CM

## 2023-09-07 DIAGNOSIS — I48.20 CHRONIC A-FIB: ICD-10-CM
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
## 2023-09-07 PROCEDURE — 99213 OFFICE O/P EST LOW 20 MIN: CPT | Mod: ,,, | Performed by: INTERNAL MEDICINE

## 2023-09-07 PROCEDURE — 99213 PR OFFICE/OUTPT VISIT, EST, LEVL III, 20-29 MIN: ICD-10-PCS | Mod: ,,, | Performed by: INTERNAL MEDICINE

## 2023-09-07 RX ORDER — ACETAMINOPHEN AND CODEINE PHOSPHATE 300; 30 MG/1; MG/1
1 TABLET ORAL EVERY 6 HOURS PRN
Status: ON HOLD | COMMUNITY
Start: 2023-07-24 | End: 2023-12-20 | Stop reason: HOSPADM

## 2023-09-07 RX ORDER — METHOCARBAMOL 500 MG/1
500 TABLET, FILM COATED ORAL 3 TIMES DAILY
COMMUNITY
End: 2023-09-18 | Stop reason: SDUPTHER

## 2023-09-07 NOTE — PROGRESS NOTES
Patient ID: Lauren Kaba is a 63 y.o. female.    Chief Complaint: Transitional Care      HPI:   Patient presents here today for above reason.     Ms. Aguilar is a 63-year-old female presents today hospital follow-up after a accidental fall on outstretched arm with a open fracture compound fracture of the distal radial head and ulna.  Status post ORIF doing well in that regard.  She did hit her face CT scans were negative.  While in the hospital she went into atrial flutter was placed on digoxin Cardizem drip.  She is now rate controlled and on Eliquis b.i.d..  Does have a history of cardiac ablation.  Currently rate controlled with a ventricular rate of 78 beats per minute blood pressure is controlled.  This is an accidental slip and fall    The patient's Health Maintenance was reviewed and the following appears to be due at this time:   Health Maintenance Due   Topic Date Due    Hepatitis C Screening  Never done    Pneumococcal Vaccines (Age 65+) (1 - PCV) Never done    Foot Exam  Never done    HIV Screening  Never done    Low Dose Statin  Never done    Diabetes Urine Screening  09/26/2019    DEXA Scan  10/18/2020    Colorectal Cancer Screening  09/27/2021    COVID-19 Vaccine (4 - Mixed Product series) 10/25/2021    Eye Exam  07/26/2023    Influenza Vaccine (1) 09/01/2023    Hemoglobin A1c  10/03/2023        Past Medical History:  Past Medical History:   Diagnosis Date    Personal history of colonic polyps 09/27/2016     Past Surgical History:   Procedure Laterality Date    BREAST SURGERY  11/2013    Reduction    CARDIAC ELECTROPHYSIOLOGY STUDY AND ABLATION      CARDIOVERSION      x 3    COLONOSCOPY W/ POLYPECTOMY  09/27/2016    JOINT REPLACEMENT  5/2013    OPEN REDUCTION AND INTERNAL FIXATION (ORIF) OF FRACTURE OF RADIUS Left 8/24/2023    Procedure: ORIF, FRACTURE, RADIUS;  Surgeon: Keith Goodwin DO;  [facility];  Service: Orthopedics;  Laterality: Left;  Distal Radius. no block, supine hand table c  arm skeletal dyamics, wash stuff    TONSILLECTOMY  1989     Review of patient's allergies indicates:   Allergen Reactions    Cipro [ciprofloxacin hcl] Hives     Current Outpatient Medications on File Prior to Visit   Medication Sig Dispense Refill    acetaminophen-codeine 300-30mg (TYLENOL #3) 300-30 mg Tab Take 1 tablet by mouth every 6 (six) hours as needed.      apixaban (ELIQUIS) 5 mg Tab Take 5 mg by mouth 2 (two) times daily.      ascorbic acid, vitamin C, (VITAMIN C) 500 MG tablet Take 500 mg by mouth.      aspirin (ECOTRIN) 81 MG EC tablet Take 1 tablet (81 mg total) by mouth 2 (two) times a day. 60 tablet 0    blood sugar diagnostic (ACCU-CHEK GUIDE TEST STRIPS) Strp USE TO TEST SUGARS FOUR TIMES A  strip 11    celecoxib (CELEBREX) 200 MG capsule TAKE ONE CAPSULE BY MOUTH EVERY DAY 30 capsule 5    dulaglutide (TRULICITY) 1.5 mg/0.5 mL pen injector Inject 1.5 mg into the skin every 7 days. 4 pen 5    fexofenadine (ALLEGRA) 180 MG tablet Take 180 mg by mouth.      fluticasone propionate (FLONASE) 50 mcg/actuation nasal spray 1 spray by Nasal route.      gabapentin (NEURONTIN) 300 MG capsule Take 1 capsule (300 mg total) by mouth 3 (three) times daily. for 14 days 42 capsule 0    glucosamine HCl (GLUCOSAMINE, BULK, MISC)       metFORMIN (GLUCOPHAGE-XR) 500 MG ER 24hr tablet TAKE ONE TABLET BY MOUTH TWICE A DAY 60 tablet 6    methocarbamoL (ROBAXIN) 500 MG Tab Take 500 mg by mouth 3 (three) times daily.      MULTIVITAMIN ORAL Take 1 tablet by mouth once daily.      omega 3-dha-epa-fish oil 1,200 (144-216) mg Cap Take 1 capsule by mouth once daily.      sotaloL (BETAPACE) 120 MG Tab Take 120 mg by mouth 2 (two) times daily.      vitamin D (VITAMIN D3) 1000 units Tab Take 1,000 Units by mouth.       No current facility-administered medications on file prior to visit.     Social History     Socioeconomic History    Marital status:    Tobacco Use    Smoking status: Never    Smokeless tobacco: Never  "  Substance and Sexual Activity    Alcohol use: Never    Drug use: Never    Sexual activity: Not Currently     Social Determinants of Health     Financial Resource Strain: Low Risk  (8/25/2023)    Overall Financial Resource Strain (CARDIA)     Difficulty of Paying Living Expenses: Not hard at all   Food Insecurity: No Food Insecurity (8/25/2023)    Hunger Vital Sign     Worried About Running Out of Food in the Last Year: Never true     Ran Out of Food in the Last Year: Never true   Transportation Needs: No Transportation Needs (8/25/2023)    PRAPARE - Transportation     Lack of Transportation (Medical): No     Lack of Transportation (Non-Medical): No   Social Connections: Unknown (8/25/2023)    Social Connection and Isolation Panel [NHANES]     Frequency of Communication with Friends and Family: More than three times a week     Frequency of Social Gatherings with Friends and Family: Twice a week     Marital Status:    Housing Stability: Unknown (8/25/2023)    Housing Stability Vital Sign     Unable to Pay for Housing in the Last Year: No     Family History   Problem Relation Age of Onset    Cancer Mother     Diabetes Mother     Arthritis Father     Heart disease Father        ROS:   Comprehensive review of systems was performed and is negative except as noted above    Vitals/PE:   /68 (BP Location: Left arm, Patient Position: Sitting)   Pulse 78   Resp 18   Ht 5' 4" (1.626 m)   Wt 110.2 kg (243 lb)   SpO2 97%   BMI 41.71 kg/m²   Physical Exam    General: Alert and oriented, No acute distress.   Eye: Normal conjunctiva without exudate.  HENMT: Normocephalic/AT, Normal hearing, Oral mucosa is moist and pink   Neck: No goiter visualized.   Respiratory: Lungs CTAB, Respirations are non-labored, Breath sounds are equal, Symmetrical chest wall expansion.  Cardiovascular: Normal rate, Regular rhythm, No murmur, No edema.   Gastrointestinal: Non-distended.   Genitourinary: Deferred.  Musculoskeletal: " Normal ROM, Normal gait, No deformities or amputations.  Integumentary: Warm, Dry, Intact. No diaphoresis, or flushing.  Neurologic: No focal deficits, Cranial Nerves II-XII are grossly intact.   Psychiatric: Cooperative, Appropriate mood & affect, Normal judgment, Non-suicidal.    Assessment/Plan:       1. Type I or II open fracture of distal end of left radius, unspecified fracture morphology, initial encounter   Sp orif, doing great  2. Fall, sequela   Fall precaution  3. Chronic a-fib   Rate controlld on oac.            Education and counseling done face to face regarding medical conditions and plan. Contact office if new symptoms develop. Should any symptoms ever significantly worsen seek emergency medical attention/go to ER. Follow up at least yearly for wellness or sooner PRN. Nurse to call patient with any results. The patient is receptive, expresses understanding and is agreeable to plan. All questions have been answered.    No follow-ups on file.

## 2023-09-18 ENCOUNTER — HOSPITAL ENCOUNTER (OUTPATIENT)
Dept: RADIOLOGY | Facility: CLINIC | Age: 63
Discharge: HOME | End: 2023-09-18
Attending: ORTHOPAEDIC SURGERY
Payer: OTHER MISCELLANEOUS

## 2023-09-18 ENCOUNTER — OFFICE VISIT (OUTPATIENT)
Dept: ORTHOPEDICS | Facility: CLINIC | Age: 63
End: 2023-09-18
Payer: OTHER MISCELLANEOUS

## 2023-09-18 VITALS
HEIGHT: 64 IN | WEIGHT: 240 LBS | SYSTOLIC BLOOD PRESSURE: 114 MMHG | BODY MASS INDEX: 40.97 KG/M2 | DIASTOLIC BLOOD PRESSURE: 69 MMHG | HEART RATE: 71 BPM | TEMPERATURE: 98 F

## 2023-09-18 DIAGNOSIS — S52.502E TYPE I OR II OPEN FRACTURE OF DISTAL END OF LEFT RADIUS WITH ROUTINE HEALING, UNSPECIFIED FRACTURE MORPHOLOGY, SUBSEQUENT ENCOUNTER: ICD-10-CM

## 2023-09-18 DIAGNOSIS — S52.502E TYPE I OR II OPEN FRACTURE OF DISTAL END OF LEFT RADIUS WITH ROUTINE HEALING, UNSPECIFIED FRACTURE MORPHOLOGY, SUBSEQUENT ENCOUNTER: Primary | ICD-10-CM

## 2023-09-18 PROCEDURE — 73110 XR WRIST COMPLETE 3 VIEWS LEFT: ICD-10-PCS | Mod: LT,,, | Performed by: ORTHOPAEDIC SURGERY

## 2023-09-18 PROCEDURE — 99024 PR POST-OP FOLLOW-UP VISIT: ICD-10-PCS | Mod: ,,, | Performed by: PHYSICIAN ASSISTANT

## 2023-09-18 PROCEDURE — 73110 X-RAY EXAM OF WRIST: CPT | Mod: LT,,, | Performed by: ORTHOPAEDIC SURGERY

## 2023-09-18 PROCEDURE — 99024 POSTOP FOLLOW-UP VISIT: CPT | Mod: ,,, | Performed by: PHYSICIAN ASSISTANT

## 2023-09-18 RX ORDER — GABAPENTIN 300 MG/1
300 CAPSULE ORAL 3 TIMES DAILY
Qty: 42 CAPSULE | Refills: 0 | Status: ON HOLD | OUTPATIENT
Start: 2023-09-18 | End: 2023-12-20 | Stop reason: HOSPADM

## 2023-09-18 RX ORDER — OXYCODONE AND ACETAMINOPHEN 7.5; 325 MG/1; MG/1
1 TABLET ORAL EVERY 6 HOURS PRN
Qty: 28 TABLET | Refills: 0 | Status: SHIPPED | OUTPATIENT
Start: 2023-09-18 | End: 2023-09-25

## 2023-09-18 RX ORDER — METHOCARBAMOL 500 MG/1
500 TABLET, FILM COATED ORAL 3 TIMES DAILY
Qty: 42 TABLET | Refills: 0 | Status: SHIPPED | OUTPATIENT
Start: 2023-09-18 | End: 2023-10-02

## 2023-09-18 NOTE — PROGRESS NOTES
Subjective:       Patient ID: Lauren Kaba is a 63 y.o. female.  Chief Complaint   Patient presents with    Post-op Evaluation     3.5 wks out ORIF LT radius Sx 8/24/23-GL 10/26/23. patient states she is having some pain occa. she is doing finger movements for exercises. has some swelling.         HPI    Patient presents for 3.5 week post op visit ORIF left radius fracture and ulna fracture. Doing well overall. Has been working on ROM of fingers. Has dorsal bridge plate. In need of all medications refilled today. Would like a brace to help protect the wrist. She has no numbness or tingling distally in the affected extremity. Sutures remain in place.    ROS:  Constitutional: Denies fever chills  Eyes: No change in vision  ENT: No ringing or current infections  CV: No chest pain  Resp: No labored breathing  MSK: Pain evident at site of injury located in HPI,   Integ: No signs of abrasions or lacerations  Neuro: No numbness or tingling  Lymphatic: No swelling outside the area of injury     Current Outpatient Medications on File Prior to Visit   Medication Sig Dispense Refill    acetaminophen-codeine 300-30mg (TYLENOL #3) 300-30 mg Tab Take 1 tablet by mouth every 6 (six) hours as needed.      apixaban (ELIQUIS) 5 mg Tab Take 5 mg by mouth 2 (two) times daily.      ascorbic acid, vitamin C, (VITAMIN C) 500 MG tablet Take 500 mg by mouth.      blood sugar diagnostic (ACCU-CHEK GUIDE TEST STRIPS) Strp USE TO TEST SUGARS FOUR TIMES A  strip 11    celecoxib (CELEBREX) 200 MG capsule TAKE ONE CAPSULE BY MOUTH EVERY DAY 30 capsule 5    dulaglutide (TRULICITY) 1.5 mg/0.5 mL pen injector Inject 1.5 mg into the skin every 7 days. 4 pen 5    fexofenadine (ALLEGRA) 180 MG tablet Take 180 mg by mouth.      fluticasone propionate (FLONASE) 50 mcg/actuation nasal spray 1 spray by Nasal route.      glucosamine HCl (GLUCOSAMINE, BULK, MISC)       metFORMIN (GLUCOPHAGE-XR) 500 MG ER 24hr tablet TAKE ONE TABLET BY MOUTH  "TWICE A DAY 60 tablet 6    MULTIVITAMIN ORAL Take 1 tablet by mouth once daily.      omega 3-dha-epa-fish oil 1,200 (144-216) mg Cap Take 1 capsule by mouth once daily.      sotaloL (BETAPACE) 120 MG Tab Take 120 mg by mouth 2 (two) times daily.      vitamin D (VITAMIN D3) 1000 units Tab Take 1,000 Units by mouth.      aspirin (ECOTRIN) 81 MG EC tablet Take 1 tablet (81 mg total) by mouth 2 (two) times a day. 60 tablet 0    [DISCONTINUED] gabapentin (NEURONTIN) 300 MG capsule Take 1 capsule (300 mg total) by mouth 3 (three) times daily. for 14 days 42 capsule 0    [DISCONTINUED] methocarbamoL (ROBAXIN) 500 MG Tab Take 500 mg by mouth 3 (three) times daily.       No current facility-administered medications on file prior to visit.          Objective:      /69 (BP Location: Right arm, Patient Position: Sitting, BP Method: Large (Automatic))   Pulse 71   Temp 98.2 °F (36.8 °C)   Ht 5' 4" (1.626 m)   Wt 108.9 kg (240 lb)   BMI 41.20 kg/m²   Physical Exam  General the patient is alert and oriented x3 no acute distress nontoxic-appearing appropriate affect.    Constitutional: Vital signs are examined and stable.  Resp: No signs of labored breathing    Musculoskeletal:     Left upper extremity: incisions healing well without erythema, drainage, signs of dehiscence, Sutures removed today without complication; compartments are soft and compressible; no wrist ROM attempted, FROM at the elbow, some stiffness at the digits but able to flex and extend; moderate tenderness to palpation; AIN/PIN/ulna motor intact; SILT distally; BCR distally; Radial pulse palpable      Body mass index is 41.2 kg/m².  Ideal body weight: 54.7 kg (120 lb 9.5 oz)  Adjusted ideal body weight: 76.4 kg (168 lb 5.7 oz)  Hemoglobin A1c   Date Value Ref Range Status   04/03/2023 6.2 <=7.0 % Final   10/12/2022 6.2 <=7.0 % Final     Hgb   Date Value Ref Range Status   08/25/2023 12.1 12.0 - 16.0 g/dL Final   08/24/2023 11.9 (L) 12.0 - 16.0 g/dL " "Final     Hct   Date Value Ref Range Status   08/25/2023 35.4 (L) 37.0 - 47.0 % Final   08/24/2023 35.7 (L) 37.0 - 47.0 % Final     No results found for: "IRON"  No components found for: "FROLATE"  No results found for: "CPUUACXV10GI"  WBC   Date Value Ref Range Status   08/25/2023 9.35 4.50 - 11.50 x10(3)/mcL Final   08/24/2023 9.05 4.50 - 11.50 x10(3)/mcL Final       Radiology: 3 view x ray of the left distal radius: bridge plate in place to the left radius and ulna without signs of loosening or failure. Fracture alignment maintained as compared to previous images. No consolidation as expected        Assessment:         1. Type I or II open fracture of distal end of left radius with routine healing, unspecified fracture morphology, subsequent encounter  X-Ray Wrist Complete Left    methocarbamoL (ROBAXIN) 500 MG Tab    gabapentin (NEURONTIN) 300 MG capsule    oxyCODONE-acetaminophen (PERCOCET) 7.5-325 mg per tablet              Plan:         Follow up in about 6 weeks (around 10/30/2023), or if symptoms worsen or fail to improve.    Lauren was seen today for post-op evaluation.    Diagnoses and all orders for this visit:    Type I or II open fracture of distal end of left radius with routine healing, unspecified fracture morphology, subsequent encounter  -     X-Ray Wrist Complete Left; Future  -     methocarbamoL (ROBAXIN) 500 MG Tab; Take 1 tablet (500 mg total) by mouth 3 (three) times daily. for 14 days  -     gabapentin (NEURONTIN) 300 MG capsule; Take 1 capsule (300 mg total) by mouth 3 (three) times daily. for 14 days  -     oxyCODONE-acetaminophen (PERCOCET) 7.5-325 mg per tablet; Take 1 tablet by mouth every 6 (six) hours as needed for Pain.        -Refill pain medications today. NWB LUE, no wrist range of motion due to bridge plate   - continue pain control with taper per our office protocol.   - sutures removed today without complication  - Will need bridge plate removed at 3 mo following healing period. "   -Follow up in 6 weeks for repeat x rays and evaluation.  -ED precautions given    The above findings, diagnostics, and treatment plan were discussed with Dr. Goodwin who is in agreement with the plan of care except as stated in additional documentation.       Yolanda Santamaria PA-C          Future Appointments   Date Time Provider Department Center   10/17/2023  1:40 PM Roni Mckeon II, MD [id number]   10/30/2023 10:00 AM Keith Goodwin DO [facility]

## 2023-09-18 NOTE — LETTER
[facility]  [address] [place] [address]. [address], [address]  Phone: [phone]  Fax: [phone]    Name:Lauren Kaba  :1960   Date:2023     PATIENT IS UNABLE TO WORK AS OF:2023  [_] Pending treatment.  [x] For approximately [_] Days [_] Weeks [x] Months  [_] Pending diagnostic testing.  [_] Pending surgical treatment.  [_] For approximately _ months (Post Surgery)    PATIENT IS ABLE TO RETURN TO WORK AS OF:    [_] SEDENTARY WORK: Lifting 10 pounds maximum and occasionally lifting and/or carrying articles such as dockers, ledgers and small tools.  Although a sedentary job is defined as one which involved sitting, a certain amount of walking and standing are required only occasionally and other sedentary criteria are met.    [_] LIGHT WORK: Lifting 20 pounds with frequent lifting and/or carrying objects weighing up to 10 pounds.  Even though the weight lifted may be only a negotiable amount, a job is in the category when it involves sitting most of the time with a degree of pushing/pulling of arm and/or leg controls.    [_] MEDIUM WORK: Lifting of 50 pounds maximum with frequent lifting and/or carrying of objects up to 25 pounds.    [_] HEAVY WORK: Lifting of 100 pounds maximum with frequent lifting and/or carrying objects up to 50 pounds.    [_] VERY HEAVY WORK: Lifting objects in excess of 100 pounds with frequent lifting and/or carrying of objects weighing 50 pounds or more.    [_] REGULAR DUTY: [_] No Restrictions. [_] With Restrictions (See comments below0:    COMMENTS    Alexandra Blair Lemaire, PA-C [facility]   Orthopedic Trauma

## 2023-09-18 NOTE — LETTER
[facility]  [address] [place] [address]. [address], [address]  Phone: [phone]  Fax: [phone]    Name:Lauren Kaba  :1960   Date:2023     PATIENT IS UNABLE TO WORK AS OF:2023  [_] Pending treatment.  [_] For approximately [_] Days [_] Weeks [3] Months  [_] Pending diagnostic testing.  [_] Pending surgical treatment.  [_] For approximately _ months (Post Surgery)    PATIENT IS ABLE TO RETURN TO WORK AS OF:    [_] SEDENTARY WORK: Lifting 10 pounds maximum and occasionally lifting and/or carrying articles such as dockers, ledgers and small tools.  Although a sedentary job is defined as one which involved sitting, a certain amount of walking and standing are required only occasionally and other sedentary criteria are met.    [_] LIGHT WORK: Lifting 20 pounds with frequent lifting and/or carrying objects weighing up to 10 pounds.  Even though the weight lifted may be only a negotiable amount, a job is in the category when it involves sitting most of the time with a degree of pushing/pulling of arm and/or leg controls.    [_] MEDIUM WORK: Lifting of 50 pounds maximum with frequent lifting and/or carrying of objects up to 25 pounds.    [_] HEAVY WORK: Lifting of 100 pounds maximum with frequent lifting and/or carrying objects up to 50 pounds.    [_] VERY HEAVY WORK: Lifting objects in excess of 100 pounds with frequent lifting and/or carrying of objects weighing 50 pounds or more.    [_] REGULAR DUTY: [_] No Restrictions. [_] With Restrictions (See comments below0:    COMMENTS    Alexandra Blair Lemaire, PA-C [facility]   Orthopedic Trauma

## 2023-09-26 ENCOUNTER — DOCUMENTATION ONLY (OUTPATIENT)
Dept: INTERNAL MEDICINE | Facility: CLINIC | Age: 63
End: 2023-09-26
Payer: COMMERCIAL

## 2023-10-30 ENCOUNTER — OFFICE VISIT (OUTPATIENT)
Dept: ORTHOPEDICS | Facility: CLINIC | Age: 63
End: 2023-10-30
Payer: OTHER MISCELLANEOUS

## 2023-10-30 ENCOUNTER — HOSPITAL ENCOUNTER (OUTPATIENT)
Dept: RADIOLOGY | Facility: CLINIC | Age: 63
Discharge: HOME | End: 2023-10-30
Attending: ORTHOPAEDIC SURGERY
Payer: OTHER MISCELLANEOUS

## 2023-10-30 VITALS
HEART RATE: 75 BPM | SYSTOLIC BLOOD PRESSURE: 128 MMHG | DIASTOLIC BLOOD PRESSURE: 79 MMHG | BODY MASS INDEX: 40.98 KG/M2 | RESPIRATION RATE: 18 BRPM | HEIGHT: 64 IN | WEIGHT: 240.06 LBS

## 2023-10-30 DIAGNOSIS — S52.502E TYPE I OR II OPEN FRACTURE OF DISTAL END OF LEFT RADIUS WITH ROUTINE HEALING, UNSPECIFIED FRACTURE MORPHOLOGY, SUBSEQUENT ENCOUNTER: Primary | ICD-10-CM

## 2023-10-30 DIAGNOSIS — S52.502E TYPE I OR II OPEN FRACTURE OF DISTAL END OF LEFT RADIUS WITH ROUTINE HEALING, UNSPECIFIED FRACTURE MORPHOLOGY, SUBSEQUENT ENCOUNTER: ICD-10-CM

## 2023-10-30 PROCEDURE — 73110 XR WRIST COMPLETE 3 VIEWS LEFT: ICD-10-PCS | Mod: LT,,, | Performed by: ORTHOPAEDIC SURGERY

## 2023-10-30 PROCEDURE — 99024 POSTOP FOLLOW-UP VISIT: CPT | Mod: ,,, | Performed by: ORTHOPAEDIC SURGERY

## 2023-10-30 PROCEDURE — 73110 X-RAY EXAM OF WRIST: CPT | Mod: LT,,, | Performed by: ORTHOPAEDIC SURGERY

## 2023-10-30 PROCEDURE — 99024 PR POST-OP FOLLOW-UP VISIT: ICD-10-PCS | Mod: ,,, | Performed by: ORTHOPAEDIC SURGERY

## 2023-10-30 NOTE — PROGRESS NOTES
Subjective:       Patient ID: Lauren Kaba is a 63 y.o. female.  Chief Complaint   Patient presents with    Left Wrist - Follow-up     9.5 WEEK F/U ORIF LEFT DISTAL RADIUS FX, IN VELCRO WRIST BRACE.        Follow-up        Patient presents for 10 week post op visit ORIF left radius fracture and ulna fracture. Doing well overall. Has been working on ROM of fingers. Has dorsal bridge plate.  She is been in a brace.  She is overall very happy she is working on range of motion.  Some dull achy pain intermittently of the left wrist.  Denies numbness tingling.  She is a  [employer]  ROS:  Constitutional: Denies fever chills  Eyes: No change in vision  ENT: No ringing or current infections  CV: No chest pain  Resp: No labored breathing  MSK: Pain evident at site of injury located in HPI,   Integ: No signs of abrasions or lacerations  Neuro: No numbness or tingling  Lymphatic: No swelling outside the area of injury     Current Outpatient Medications on File Prior to Visit   Medication Sig Dispense Refill    acetaminophen-codeine 300-30mg (TYLENOL #3) 300-30 mg Tab Take 1 tablet by mouth every 6 (six) hours as needed.      apixaban (ELIQUIS) 5 mg Tab Take 5 mg by mouth 2 (two) times daily.      ascorbic acid, vitamin C, (VITAMIN C) 500 MG tablet Take 500 mg by mouth.      blood sugar diagnostic (ACCU-CHEK GUIDE TEST STRIPS) Strp USE TO TEST SUGARS FOUR TIMES A  strip 11    celecoxib (CELEBREX) 200 MG capsule TAKE ONE CAPSULE BY MOUTH EVERY DAY 30 capsule 5    dulaglutide (TRULICITY) 1.5 mg/0.5 mL pen injector Inject 1.5 mg into the skin every 7 days. 4 pen 5    fexofenadine (ALLEGRA) 180 MG tablet Take 180 mg by mouth.      fluticasone propionate (FLONASE) 50 mcg/actuation nasal spray 1 spray by Nasal route.      glucosamine HCl (GLUCOSAMINE, BULK, MISC)       metFORMIN (GLUCOPHAGE-XR) 500 MG ER 24hr tablet TAKE ONE TABLET BY MOUTH TWICE A DAY 60 tablet 6    MULTIVITAMIN ORAL Take 1 tablet by mouth  "once daily.      omega 3-dha-epa-fish oil 1,200 (144-216) mg Cap Take 1 capsule by mouth once daily.      sotaloL (BETAPACE) 120 MG Tab Take 120 mg by mouth 2 (two) times daily.      vitamin D (VITAMIN D3) 1000 units Tab Take 1,000 Units by mouth.      aspirin (ECOTRIN) 81 MG EC tablet Take 1 tablet (81 mg total) by mouth 2 (two) times a day. 60 tablet 0    gabapentin (NEURONTIN) 300 MG capsule Take 1 capsule (300 mg total) by mouth 3 (three) times daily. for 14 days 42 capsule 0     No current facility-administered medications on file prior to visit.          Objective:      /79   Pulse 75   Resp 18   Ht 5' 4" (1.626 m)   Wt 108.9 kg (240 lb 1.3 oz)   BMI 41.21 kg/m²   Physical Exam  General the patient is alert and oriented x3 no acute distress nontoxic-appearing appropriate affect.    Constitutional: Vital signs are examined and stable.  Resp: No signs of labored breathing    Musculoskeletal:     Left upper extremity: incisions healing well without erythema, drainage, signs of dehiscence, Sutures removed today without complication; compartments are soft and compressible; no wrist ROM attempted, FROM at the elbow, some stiffness at the digits but able to flex and extend; moderate tenderness to palpation; AIN/PIN/ulna motor intact; SILT distally; BCR distally; Radial pulse palpable near full range of motion of the fingers      Body mass index is 41.21 kg/m².  Ideal body weight: 54.7 kg (120 lb 9.5 oz)  Adjusted ideal body weight: 76.4 kg (168 lb 6.2 oz)  Hemoglobin A1c   Date Value Ref Range Status   04/03/2023 6.2 <=7.0 % Final   10/12/2022 6.2 <=7.0 % Final     Hgb   Date Value Ref Range Status   08/25/2023 12.1 12.0 - 16.0 g/dL Final   08/24/2023 11.9 (L) 12.0 - 16.0 g/dL Final     Hct   Date Value Ref Range Status   08/25/2023 35.4 (L) 37.0 - 47.0 % Final   08/24/2023 35.7 (L) 37.0 - 47.0 % Final     No results found for: "IRON"  No components found for: "FROLATE"  No results found for: " ""[id number]"  WBC   Date Value Ref Range Status   08/25/2023 9.35 4.50 - 11.50 x10(3)/mcL Final   08/24/2023 9.05 4.50 - 11.50 x10(3)/mcL Final       Radiology: 3 view x ray of the left distal radius: bridge plate in place to the left radius and ulna without signs of loosening or failure. Fracture alignment maintained as compared to previous images.  Signs of healing evident        Assessment:         1. Type I or II open fracture of distal end of left radius with routine healing, unspecified fracture morphology, subsequent encounter  X-Ray Wrist Complete Left              Plan:         No follow-ups on file.    Lauren was seen today for follow-up.    Diagnoses and all orders for this visit:    Type I or II open fracture of distal end of left radius with routine healing, unspecified fracture morphology, subsequent encounter  -     X-Ray Wrist Complete Left; Future        -we will see the patient come back in a proximally 6 weeks we will schedule her bridge plate removal at that time.  We did discuss possible volar plating.  Patient has gone onto stable malunion.  Overall she is very happy she has some intermittent dull achy pain but overall doing well.  She is working on hand therapy.  She will likely return to work in 2024.  Overall she is very happy with the care at this time very happy with her progress.  She is excited to get her plate out.    This note/OR report was created with the assistance of  voice recognition software or phone  dictation.  There may be transcription errors as a result of using this technology however minimal. Effort has been made to assure accuracy of transcription but any obvious errors or omissions should be clarified with the author of the document.       Keith Goodwin, DO  Orthopedic Trauma Surgery       No future appointments.              "

## 2023-11-25 DIAGNOSIS — E11.9 TYPE 2 DIABETES MELLITUS WITHOUT COMPLICATION, UNSPECIFIED WHETHER LONG TERM INSULIN USE: ICD-10-CM

## 2023-11-27 RX ORDER — BLOOD SUGAR DIAGNOSTIC
STRIP MISCELLANEOUS
Qty: 100 STRIP | Refills: 11 | Status: SHIPPED | OUTPATIENT
Start: 2023-11-27

## 2023-12-05 ENCOUNTER — OFFICE VISIT (OUTPATIENT)
Dept: ORTHOPEDICS | Facility: CLINIC | Age: 63
End: 2023-12-05
Payer: OTHER MISCELLANEOUS

## 2023-12-05 ENCOUNTER — HOSPITAL ENCOUNTER (OUTPATIENT)
Dept: RADIOLOGY | Facility: CLINIC | Age: 63
Discharge: HOME | End: 2023-12-05
Attending: ORTHOPAEDIC SURGERY
Payer: OTHER MISCELLANEOUS

## 2023-12-05 VITALS — WEIGHT: 240 LBS | BODY MASS INDEX: 40.97 KG/M2 | HEIGHT: 64 IN

## 2023-12-05 DIAGNOSIS — S52.502E TYPE I OR II OPEN FRACTURE OF DISTAL END OF LEFT RADIUS WITH ROUTINE HEALING, UNSPECIFIED FRACTURE MORPHOLOGY, SUBSEQUENT ENCOUNTER: ICD-10-CM

## 2023-12-05 DIAGNOSIS — S52.502E TYPE I OR II OPEN FRACTURE OF DISTAL END OF LEFT RADIUS WITH ROUTINE HEALING, UNSPECIFIED FRACTURE MORPHOLOGY, SUBSEQUENT ENCOUNTER: Primary | ICD-10-CM

## 2023-12-05 PROCEDURE — 73110 X-RAY EXAM OF WRIST: CPT | Mod: LT,,, | Performed by: ORTHOPAEDIC SURGERY

## 2023-12-05 PROCEDURE — 99214 OFFICE O/P EST MOD 30 MIN: CPT | Mod: ,,, | Performed by: ORTHOPAEDIC SURGERY

## 2023-12-05 PROCEDURE — 73110 XR WRIST COMPLETE 3 VIEWS LEFT: ICD-10-PCS | Mod: LT,,, | Performed by: ORTHOPAEDIC SURGERY

## 2023-12-05 PROCEDURE — 99214 PR OFFICE/OUTPT VISIT, EST, LEVL IV, 30-39 MIN: ICD-10-PCS | Mod: ,,, | Performed by: ORTHOPAEDIC SURGERY

## 2023-12-05 RX ORDER — DILTIAZEM HYDROCHLORIDE 120 MG/1
240 CAPSULE, COATED, EXTENDED RELEASE ORAL 2 TIMES DAILY
COMMUNITY
Start: 2023-11-21

## 2023-12-05 RX ORDER — SODIUM CHLORIDE 0.9 % (FLUSH) 0.9 %
10 SYRINGE (ML) INJECTION
Status: SHIPPED | OUTPATIENT
Start: 2023-12-05

## 2023-12-05 RX ORDER — MUPIROCIN 20 MG/G
OINTMENT TOPICAL
Status: CANCELLED | OUTPATIENT
Start: 2023-12-05

## 2023-12-05 NOTE — PROGRESS NOTES
Subjective:       Patient ID: Lauren Kaba is a 63 y.o. female.  Chief Complaint   Patient presents with    Left Wrist - Follow-up     3.5months ORIF L distal radius - 8/24/23 - aches every now and then. In brace today. No pain medication.         Follow-up        Patient presents for 3.5mo post op visit ORIF left radius fracture and ulna fracture. Doing well overall. Has been working on ROM of fingers. Has dorsal bridge plate.  She is been in a brace.  She is overall very happy she is working on range of motion.   by [employer]  ROS:  Constitutional: Denies fever chills  Eyes: No change in vision  ENT: No ringing or current infections  CV: No chest pain  Resp: No labored breathing  MSK: Pain evident at site of injury located in HPI,   Integ: No signs of abrasions or lacerations  Neuro: No numbness or tingling  Lymphatic: No swelling outside the area of injury     Current Outpatient Medications on File Prior to Visit   Medication Sig Dispense Refill    diltiaZEM (CARDIZEM CD) 120 MG Cp24 Take 120 mg by mouth.      ACCU-CHEK GUIDE TEST STRIPS Strp USE TO TEST BLOOD SUGAR TWO TIMES A  strip 11    acetaminophen-codeine 300-30mg (TYLENOL #3) 300-30 mg Tab Take 1 tablet by mouth every 6 (six) hours as needed.      apixaban (ELIQUIS) 5 mg Tab Take 5 mg by mouth 2 (two) times daily.      ascorbic acid, vitamin C, (VITAMIN C) 500 MG tablet Take 500 mg by mouth.      aspirin (ECOTRIN) 81 MG EC tablet Take 1 tablet (81 mg total) by mouth 2 (two) times a day. 60 tablet 0    celecoxib (CELEBREX) 200 MG capsule TAKE ONE CAPSULE BY MOUTH EVERY DAY 30 capsule 5    dulaglutide (TRULICITY) 1.5 mg/0.5 mL pen injector Inject 1.5 mg into the skin every 7 days. 4 pen 5    fexofenadine (ALLEGRA) 180 MG tablet Take 180 mg by mouth.      fluticasone propionate (FLONASE) 50 mcg/actuation nasal spray 1 spray by Nasal route.      gabapentin (NEURONTIN) 300 MG capsule Take 1 capsule (300 mg total) by mouth 3 (three) times  "daily. for 14 days 42 capsule 0    glucosamine HCl (GLUCOSAMINE, BULK, MISC)       metFORMIN (GLUCOPHAGE-XR) 500 MG ER 24hr tablet TAKE ONE TABLET BY MOUTH TWICE A DAY 60 tablet 6    MULTIVITAMIN ORAL Take 1 tablet by mouth once daily.      omega 3-dha-epa-fish oil 1,200 (144-216) mg Cap Take 1 capsule by mouth once daily.      sotaloL (BETAPACE) 120 MG Tab Take 120 mg by mouth 2 (two) times daily.      vitamin D (VITAMIN D3) 1000 units Tab Take 1,000 Units by mouth.       No current facility-administered medications on file prior to visit.          Objective:      Ht 5' 4" (1.626 m)   Wt 108.9 kg (240 lb)   BMI 41.20 kg/m²   Physical Exam  General the patient is alert and oriented x3 no acute distress nontoxic-appearing appropriate affect.    Constitutional: Vital signs are examined and stable.  Resp: No signs of labored breathing    Musculoskeletal:     Left upper extremity: incisions healing well without erythema, drainage, signs of dehiscence, Sutures removed today without complication; compartments are soft and compressible; no wrist ROM attempted, FROM at the elbow, some stiffness at the digits but able to flex and extend; moderate tenderness to palpation; AIN/PIN/ulna motor intact; SILT distally; BCR distally; Radial pulse palpable near full range of motion of the fingers      Body mass index is 41.2 kg/m².  Ideal body weight: 54.7 kg (120 lb 9.5 oz)  Adjusted ideal body weight: 76.4 kg (168 lb 5.7 oz)  Hemoglobin A1c   Date Value Ref Range Status   04/03/2023 6.2 <=7.0 % Final   10/12/2022 6.2 <=7.0 % Final     Hgb   Date Value Ref Range Status   08/25/2023 12.1 12.0 - 16.0 g/dL Final   08/24/2023 11.9 (L) 12.0 - 16.0 g/dL Final     Hct   Date Value Ref Range Status   08/25/2023 35.4 (L) 37.0 - 47.0 % Final   08/24/2023 35.7 (L) 37.0 - 47.0 % Final     No results found for: "IRON"  No components found for: "FROLATE"  No results found for: "AVEDFBHW23NO"  WBC   Date Value Ref Range Status   08/25/2023 9.35 " 4.50 - 11.50 x10(3)/mcL Final   08/24/2023 9.05 4.50 - 11.50 x10(3)/mcL Final       Radiology: 3 view x ray of the left distal radius: bridge plate in place to the left radius and ulna without signs of loosening or failure. Fracture alignment maintained as compared to previous images.  Some osteolysis dorsally        Assessment:         1. Type I or II open fracture of distal end of left radius with routine healing, unspecified fracture morphology, subsequent encounter  X-Ray Wrist Complete Left    CT Wrist Without Contrast Left              Plan:         No follow-ups on file.    Lauren was seen today for follow-up.    Diagnoses and all orders for this visit:    Type I or II open fracture of distal end of left radius with routine healing, unspecified fracture morphology, subsequent encounter  -     X-Ray Wrist Complete Left; Future  -     CT Wrist Without Contrast Left; Future        Patient is doing well.  Ready for bridge plate removal as it is past the 3 months lo.  We will get a CT scan preoperatively to sure we do not need to reinforce the fracture from the volar aspect.  Patient understands risks benefits with the procedure.  She also understands severity of the injury.    I explained that surgery and the nature of their condition are not without risks. These include, but are not limited to, bleeding, infection, neurovascular compromise, malunion, nonunion, hardware complications, wound complications, scarring, cosmetic defects, need for later and/or repeated surgeries, avascular necrosis, bone death due to initial trauma, pain, loss of ROM, loss of function, PTOA, deformity, stance/gait and/or functional abnormalities, thromboembolic complications, compartment syndrome, loss of limb, loss of life, anesthetic complications, and other imponderables. I explained that these can occur despite the adequacy of treatments rendered, and that their risks are heightened given the nature of their condition. They  verbalized understanding. They would like to continue with surgery at this time. If appropriate family was involved with surgical discussion.     This note/OR report was created with the assistance of  voice recognition software or phone  dictation.  There may be transcription errors as a result of using this technology however minimal. Effort has been made to assure accuracy of transcription but any obvious errors or omissions should be clarified with the author of the document.       Keith Goodwin, DO  Orthopedic Trauma Surgery       No future appointments.

## 2023-12-11 ENCOUNTER — TELEPHONE (OUTPATIENT)
Dept: ORTHOPEDICS | Facility: HOSPITAL | Age: 63
End: 2023-12-11
Payer: COMMERCIAL

## 2023-12-11 NOTE — TELEPHONE ENCOUNTER
Spoke to patient today to informed her that the 1010 request for sx as well as ct celestin was sent to worker comp. We are awaiting 1010 approval. Patient voiced a clear understanding and states workers comp informed her that the request was received. Patient will call with an other information workers comp may need.

## 2023-12-12 ENCOUNTER — PATIENT MESSAGE (OUTPATIENT)
Dept: ADMINISTRATIVE | Facility: HOSPITAL | Age: 63
End: 2023-12-12
Payer: COMMERCIAL

## 2023-12-12 RX ORDER — SOTALOL HYDROCHLORIDE 160 MG/1
160 TABLET ORAL 2 TIMES DAILY
COMMUNITY
Start: 2023-11-19

## 2023-12-12 RX ORDER — ACETAMINOPHEN 500 MG
1000 TABLET ORAL EVERY 6 HOURS PRN
COMMUNITY

## 2023-12-14 ENCOUNTER — TELEPHONE (OUTPATIENT)
Dept: ORTHOPEDICS | Facility: CLINIC | Age: 63
End: 2023-12-14
Payer: COMMERCIAL

## 2023-12-14 NOTE — TELEPHONE ENCOUNTER
Received cardiac kathy from patients cardiologist. Informed patient she is to stop eliquis 2 day pre and post operatively. Patient voiced a clear understanding.     I also spoke to patient workers comp , Essence Landin, regarding 1010 approval for Ct scan and sx. She informed me that they did received updated 1010 and are waiting for approval. I relayed information to patient. Patient voiced a clear understanding and will call with any other questions or concerns.

## 2023-12-15 ENCOUNTER — ANESTHESIA EVENT (OUTPATIENT)
Dept: SURGERY | Facility: HOSPITAL | Age: 63
End: 2023-12-15
Payer: OTHER MISCELLANEOUS

## 2023-12-18 ENCOUNTER — PATIENT MESSAGE (OUTPATIENT)
Dept: ADMINISTRATIVE | Facility: OTHER | Age: 63
End: 2023-12-18
Payer: COMMERCIAL

## 2023-12-19 NOTE — PRE-PROCEDURE INSTRUCTIONS
[facility]: Outpatient Surgery   Preprocedure Check-In Instructions       Your arrival time for your surgery or procedure is 5:00am.     We ask patients to arrive about 2 hours before surgery to allow for enough time to review your health history & medications, start your IV, complete any outstanding labwork or tests, and meet your Anesthesiologist.     You will arrive at [facility], [address] [place] [address]. Enter through the [place] entrance next to the Emergency Room, and come to the 6th floor to the Outpatient Surgery Department. If you need a wheelchair, please call [phone] for an attendant to meet you at the [place] entrance with a wheelchair.    Wait Times:  Due to inconsistent procedure completion times, an unexpected wait may occur.  The physicians would like you to be here in the event they run ahead of time.  We will keep you comfortable and informed while you are waiting.  We apologize in advance if this happens.    Visitory Policy:   You are allowed 2 adult visitors to be with you in the hospital. All hospital visitors should be in good current health. No small children.     What to Bring:   Please have your ID, insurance cards, and all home medication bottles with you at check in. Bring your CPAP machine if one is used at home.     Fasting:   Nothing to eat or drink after midnight the night before your procedure. This includes no ice, gum, hard candies, and/or tobacco products.   Follow your doctor's instructions for taking any medications on the morning of your procedure. If no instructions for taking medications were given, do not take any medications but bring your medications in their bottles to your procedure check in.     Follow your doctor's preoperative instructions regarding skin prep, bowel prep, bathing, or showering prior to your procedure. If any special soaps were provided to you, please use according to your doctor's instructions.  If no instructions were given from your doctor, take a good bath or shower with antibacterial soap the night before and the morning of your procedure. On the morning of procedure, wear loose, comfortable clothing. No lotions, makeup, perfumes, colognes, deodorant, or jewelry to your procedure. Removable items (glasses, contact lenses, dentures, retainers, hearing aids) need to be removed for your procedure. Bring your storage containers for these items if you wear them.     Artificial nails, body jewelry, eyelash extensions, and/or hair extensions with metal clips are not allowed during your surgery. If you currently wear any of these items, please arrange for them to be removed prior to your arrival to the hospital.     Outpatient or Same Day Surgeries:   Any patients receiving sedation/anesthesia are advised not to drive for 24 hours after their procedure. We do not allow patients to drive themselves home after discharge. If you are going home after your procedure, please have someone available to drive you home from the hospital.     You may call the Outpatient Surgery Department at [phone] with any questions or concerns. We are looking forward to meeting you and taking great care of you for your procedure. Thank you for choosing [facility] for your surgical needs.       Status: complete  Spoke with: patient  Call Time: [id number]

## 2023-12-20 ENCOUNTER — HOSPITAL ENCOUNTER (OUTPATIENT)
Facility: HOSPITAL | Age: 63
Discharge: HOME | End: 2023-12-20
Attending: ORTHOPAEDIC SURGERY | Admitting: ORTHOPAEDIC SURGERY
Payer: OTHER MISCELLANEOUS

## 2023-12-20 ENCOUNTER — ANESTHESIA (OUTPATIENT)
Dept: SURGERY | Facility: HOSPITAL | Age: 63
End: 2023-12-20
Payer: OTHER MISCELLANEOUS

## 2023-12-20 DIAGNOSIS — S52.552E OTHER TYPE I OR II OPEN EXTRA-ARTICULAR FRACTURE OF DISTAL END OF LEFT RADIUS WITH ROUTINE HEALING, SUBSEQUENT ENCOUNTER: Primary | ICD-10-CM

## 2023-12-20 DIAGNOSIS — Z01.818 PREOP EXAMINATION: ICD-10-CM

## 2023-12-20 DIAGNOSIS — S52.502E TYPE I OR II OPEN FRACTURE OF DISTAL END OF LEFT RADIUS WITH ROUTINE HEALING, UNSPECIFIED FRACTURE MORPHOLOGY, SUBSEQUENT ENCOUNTER: ICD-10-CM

## 2023-12-20 LAB
ANION GAP SERPL CALC-SCNC: 8 MEQ/L
BASOPHILS # BLD AUTO: 0.05 X10(3)/MCL
BASOPHILS NFR BLD AUTO: 0.7 %
BUN SERPL-MCNC: 17.5 MG/DL (ref 9.8–20.1)
CALCIUM SERPL-MCNC: 8.9 MG/DL (ref 8.4–10.2)
CHLORIDE SERPL-SCNC: 106 MMOL/L (ref 98–107)
CO2 SERPL-SCNC: 24 MMOL/L (ref 23–31)
CREAT SERPL-MCNC: 0.64 MG/DL (ref 0.55–1.02)
CREAT/UREA NIT SERPL: 27
EOSINOPHIL # BLD AUTO: 0.23 X10(3)/MCL (ref 0–0.9)
EOSINOPHIL NFR BLD AUTO: 3.3 %
ERYTHROCYTE [DISTWIDTH] IN BLOOD BY AUTOMATED COUNT: 13.1 % (ref 11.5–17)
GFR SERPLBLD CREATININE-BSD FMLA CKD-EPI: >60 MLS/MIN/1.73/M2
GLUCOSE SERPL-MCNC: 145 MG/DL (ref 82–115)
GROUP & RH: NORMAL
HCT VFR BLD AUTO: 37.1 % (ref 37–47)
HGB BLD-MCNC: 12.2 G/DL (ref 12–16)
IMM GRANULOCYTES # BLD AUTO: 0.06 X10(3)/MCL (ref 0–0.04)
IMM GRANULOCYTES NFR BLD AUTO: 0.9 %
INDIRECT COOMBS GEL: NORMAL
LYMPHOCYTES # BLD AUTO: 2.2 X10(3)/MCL (ref 0.6–4.6)
LYMPHOCYTES NFR BLD AUTO: 31.7 %
MCH RBC QN AUTO: 28.9 PG (ref 27–31)
MCHC RBC AUTO-ENTMCNC: 32.9 G/DL (ref 33–36)
MCV RBC AUTO: 87.9 FL (ref 80–94)
MONOCYTES # BLD AUTO: 0.64 X10(3)/MCL (ref 0.1–1.3)
MONOCYTES NFR BLD AUTO: 9.2 %
NEUTROPHILS # BLD AUTO: 3.77 X10(3)/MCL (ref 2.1–9.2)
NEUTROPHILS NFR BLD AUTO: 54.2 %
NRBC BLD AUTO-RTO: 0 %
PLATELET # BLD AUTO: 200 X10(3)/MCL (ref 130–400)
PMV BLD AUTO: 8.7 FL (ref 7.4–10.4)
POCT GLUCOSE: 134 MG/DL (ref 70–110)
POTASSIUM SERPL-SCNC: 3.5 MMOL/L (ref 3.5–5.1)
RBC # BLD AUTO: 4.22 X10(6)/MCL (ref 4.2–5.4)
SODIUM SERPL-SCNC: 138 MMOL/L (ref 136–145)
SPECIMEN OUTDATE: NORMAL
WBC # SPEC AUTO: 6.95 X10(3)/MCL (ref 4.5–11.5)

## 2023-12-20 PROCEDURE — 86901 BLOOD TYPING SEROLOGIC RH(D): CPT | Performed by: ORTHOPAEDIC SURGERY

## 2023-12-20 PROCEDURE — 85025 COMPLETE CBC W/AUTO DIFF WBC: CPT | Performed by: ANESTHESIOLOGY

## 2023-12-20 PROCEDURE — 25400 PR REPAIR NONUNION RADIUS OR ULNA: ICD-10-PCS | Mod: LT,,, | Performed by: ORTHOPAEDIC SURGERY

## 2023-12-20 PROCEDURE — 71000033 HC RECOVERY, INTIAL HOUR: Performed by: ORTHOPAEDIC SURGERY

## 2023-12-20 PROCEDURE — 63600175 PHARM REV CODE 636 W HCPCS: Performed by: PHYSICIAN ASSISTANT

## 2023-12-20 PROCEDURE — 80048 BASIC METABOLIC PNL TOTAL CA: CPT | Performed by: ANESTHESIOLOGY

## 2023-12-20 PROCEDURE — D9220A PRA ANESTHESIA: Mod: CRNA,,, | Performed by: NURSE ANESTHETIST, CERTIFIED REGISTERED

## 2023-12-20 PROCEDURE — 25400 PR REPAIR NONUNION RADIUS OR ULNA: ICD-10-PCS | Mod: AS,LT,, | Performed by: PHYSICIAN ASSISTANT

## 2023-12-20 PROCEDURE — 71000016 HC POSTOP RECOV ADDL HR: Performed by: ORTHOPAEDIC SURGERY

## 2023-12-20 PROCEDURE — 25000003 PHARM REV CODE 250: Performed by: ORTHOPAEDIC SURGERY

## 2023-12-20 PROCEDURE — 36000708 HC OR TIME LEV III 1ST 15 MIN: Performed by: ORTHOPAEDIC SURGERY

## 2023-12-20 PROCEDURE — 27201423 OPTIME MED/SURG SUP & DEVICES STERILE SUPPLY: Performed by: ORTHOPAEDIC SURGERY

## 2023-12-20 PROCEDURE — 25295 PR RELEASE WRIST/FOREARM TENDON: ICD-10-PCS | Mod: 59,LT,, | Performed by: ORTHOPAEDIC SURGERY

## 2023-12-20 PROCEDURE — 37000008 HC ANESTHESIA 1ST 15 MINUTES: Performed by: ORTHOPAEDIC SURGERY

## 2023-12-20 PROCEDURE — 25295 RELEASE WRIST/FOREARM TENDON: CPT | Mod: 59,LT,, | Performed by: ORTHOPAEDIC SURGERY

## 2023-12-20 PROCEDURE — 82962 GLUCOSE BLOOD TEST: CPT | Performed by: ORTHOPAEDIC SURGERY

## 2023-12-20 PROCEDURE — 71000015 HC POSTOP RECOV 1ST HR: Performed by: ORTHOPAEDIC SURGERY

## 2023-12-20 PROCEDURE — 25400 REPAIR RADIUS OR ULNA: CPT | Mod: LT,,, | Performed by: ORTHOPAEDIC SURGERY

## 2023-12-20 PROCEDURE — D9220A PRA ANESTHESIA: ICD-10-PCS | Mod: CRNA,,, | Performed by: NURSE ANESTHETIST, CERTIFIED REGISTERED

## 2023-12-20 PROCEDURE — D9220A PRA ANESTHESIA: ICD-10-PCS | Mod: ANES,,, | Performed by: ANESTHESIOLOGY

## 2023-12-20 PROCEDURE — 25000003 PHARM REV CODE 250: Performed by: NURSE ANESTHETIST, CERTIFIED REGISTERED

## 2023-12-20 PROCEDURE — 25000003 PHARM REV CODE 250: Performed by: PHYSICIAN ASSISTANT

## 2023-12-20 PROCEDURE — C1776 JOINT DEVICE (IMPLANTABLE): HCPCS | Performed by: ORTHOPAEDIC SURGERY

## 2023-12-20 PROCEDURE — 37000009 HC ANESTHESIA EA ADD 15 MINS: Performed by: ORTHOPAEDIC SURGERY

## 2023-12-20 PROCEDURE — 93005 ELECTROCARDIOGRAM TRACING: CPT

## 2023-12-20 PROCEDURE — 63600175 PHARM REV CODE 636 W HCPCS: Performed by: NURSE ANESTHETIST, CERTIFIED REGISTERED

## 2023-12-20 PROCEDURE — 63600175 PHARM REV CODE 636 W HCPCS: Performed by: ORTHOPAEDIC SURGERY

## 2023-12-20 PROCEDURE — 36000709 HC OR TIME LEV III EA ADD 15 MIN: Performed by: ORTHOPAEDIC SURGERY

## 2023-12-20 PROCEDURE — 25400 REPAIR RADIUS OR ULNA: CPT | Mod: AS,LT,, | Performed by: PHYSICIAN ASSISTANT

## 2023-12-20 PROCEDURE — C1713 ANCHOR/SCREW BN/BN,TIS/BN: HCPCS | Performed by: ORTHOPAEDIC SURGERY

## 2023-12-20 PROCEDURE — D9220A PRA ANESTHESIA: Mod: ANES,,, | Performed by: ANESTHESIOLOGY

## 2023-12-20 DEVICE — SCREW GEMINUS NLOK 3.5X12MM: Type: IMPLANTABLE DEVICE | Site: RADIUS | Status: FUNCTIONAL

## 2023-12-20 DEVICE — PEG FIX GEMINUS N LOK 18X2.3MM: Type: IMPLANTABLE DEVICE | Site: RADIUS | Status: FUNCTIONAL

## 2023-12-20 DEVICE — PEG FIX GEMINUS THRED 22X2.3MM: Type: IMPLANTABLE DEVICE | Site: RADIUS | Status: FUNCTIONAL

## 2023-12-20 DEVICE — PEG FIX GEMINUS DIST 14X2.3MM: Type: IMPLANTABLE DEVICE | Site: RADIUS | Status: FUNCTIONAL

## 2023-12-20 DEVICE — IMPLANTABLE DEVICE: Type: IMPLANTABLE DEVICE | Site: RADIUS | Status: FUNCTIONAL

## 2023-12-20 RX ORDER — HYDROMORPHONE HYDROCHLORIDE 2 MG/ML
0.2 INJECTION, SOLUTION INTRAMUSCULAR; INTRAVENOUS; SUBCUTANEOUS EVERY 5 MIN PRN
Status: DISCONTINUED | OUTPATIENT
Start: 2023-12-20 | End: 2023-12-20 | Stop reason: HOSPADM

## 2023-12-20 RX ORDER — MIDAZOLAM HYDROCHLORIDE 1 MG/ML
INJECTION INTRAMUSCULAR; INTRAVENOUS
Status: DISCONTINUED | OUTPATIENT
Start: 2023-12-20 | End: 2023-12-20

## 2023-12-20 RX ORDER — MEPERIDINE HYDROCHLORIDE 25 MG/ML
12.5 INJECTION INTRAMUSCULAR; INTRAVENOUS; SUBCUTANEOUS ONCE
Status: DISCONTINUED | OUTPATIENT
Start: 2023-12-20 | End: 2023-12-20 | Stop reason: HOSPADM

## 2023-12-20 RX ORDER — HYDROMORPHONE HYDROCHLORIDE 2 MG/ML
0.5 INJECTION, SOLUTION INTRAMUSCULAR; INTRAVENOUS; SUBCUTANEOUS EVERY 5 MIN PRN
Status: DISCONTINUED | OUTPATIENT
Start: 2023-12-20 | End: 2023-12-20 | Stop reason: HOSPADM

## 2023-12-20 RX ORDER — HYDROCODONE BITARTRATE AND ACETAMINOPHEN 10; 325 MG/1; MG/1
1 TABLET ORAL EVERY 4 HOURS PRN
Qty: 42 TABLET | Refills: 0 | Status: SHIPPED | OUTPATIENT
Start: 2023-12-20 | End: 2023-12-27

## 2023-12-20 RX ORDER — ONDANSETRON 2 MG/ML
4 INJECTION INTRAMUSCULAR; INTRAVENOUS ONCE
Status: DISCONTINUED | OUTPATIENT
Start: 2023-12-20 | End: 2023-12-20 | Stop reason: HOSPADM

## 2023-12-20 RX ORDER — ROCURONIUM BROMIDE 10 MG/ML
INJECTION, SOLUTION INTRAVENOUS
Status: DISCONTINUED | OUTPATIENT
Start: 2023-12-20 | End: 2023-12-20

## 2023-12-20 RX ORDER — DEXAMETHASONE SODIUM PHOSPHATE 4 MG/ML
INJECTION, SOLUTION INTRA-ARTICULAR; INTRALESIONAL; INTRAMUSCULAR; INTRAVENOUS; SOFT TISSUE
Status: DISCONTINUED | OUTPATIENT
Start: 2023-12-20 | End: 2023-12-20

## 2023-12-20 RX ORDER — METHOCARBAMOL 750 MG/1
750 TABLET, FILM COATED ORAL 3 TIMES DAILY
Status: DISCONTINUED | OUTPATIENT
Start: 2023-12-20 | End: 2023-12-20 | Stop reason: HOSPADM

## 2023-12-20 RX ORDER — KETOROLAC TROMETHAMINE 10 MG/1
10 TABLET, FILM COATED ORAL EVERY 6 HOURS PRN
Qty: 20 TABLET | Refills: 0 | Status: SHIPPED | OUTPATIENT
Start: 2023-12-20 | End: 2023-12-25

## 2023-12-20 RX ORDER — HYDROCODONE BITARTRATE AND ACETAMINOPHEN 5; 325 MG/1; MG/1
1 TABLET ORAL EVERY 4 HOURS PRN
Status: DISCONTINUED | OUTPATIENT
Start: 2023-12-20 | End: 2023-12-20 | Stop reason: HOSPADM

## 2023-12-20 RX ORDER — ONDANSETRON 2 MG/ML
4 INJECTION INTRAMUSCULAR; INTRAVENOUS EVERY 6 HOURS PRN
Status: DISCONTINUED | OUTPATIENT
Start: 2023-12-20 | End: 2023-12-20 | Stop reason: HOSPADM

## 2023-12-20 RX ORDER — DIPHENHYDRAMINE HYDROCHLORIDE 50 MG/ML
25 INJECTION INTRAMUSCULAR; INTRAVENOUS EVERY 6 HOURS PRN
Status: DISCONTINUED | OUTPATIENT
Start: 2023-12-20 | End: 2023-12-20 | Stop reason: HOSPADM

## 2023-12-20 RX ORDER — HYDROCODONE BITARTRATE AND ACETAMINOPHEN 10; 325 MG/1; MG/1
1 TABLET ORAL EVERY 4 HOURS PRN
Status: DISCONTINUED | OUTPATIENT
Start: 2023-12-20 | End: 2023-12-20 | Stop reason: HOSPADM

## 2023-12-20 RX ORDER — HYDROMORPHONE HYDROCHLORIDE 2 MG/ML
INJECTION, SOLUTION INTRAMUSCULAR; INTRAVENOUS; SUBCUTANEOUS
Status: DISCONTINUED | OUTPATIENT
Start: 2023-12-20 | End: 2023-12-20

## 2023-12-20 RX ORDER — METHOCARBAMOL 750 MG/1
750 TABLET, FILM COATED ORAL 3 TIMES DAILY
Qty: 30 TABLET | Refills: 0 | Status: SHIPPED | OUTPATIENT
Start: 2023-12-20 | End: 2023-12-30

## 2023-12-20 RX ORDER — KETOROLAC TROMETHAMINE 30 MG/ML
INJECTION, SOLUTION INTRAMUSCULAR; INTRAVENOUS
Status: DISCONTINUED | OUTPATIENT
Start: 2023-12-20 | End: 2023-12-20

## 2023-12-20 RX ORDER — CEFAZOLIN SODIUM 2 G/50ML
2 SOLUTION INTRAVENOUS
Status: COMPLETED | OUTPATIENT
Start: 2023-12-20 | End: 2023-12-20

## 2023-12-20 RX ORDER — FENTANYL CITRATE 50 UG/ML
INJECTION, SOLUTION INTRAMUSCULAR; INTRAVENOUS
Status: DISCONTINUED | OUTPATIENT
Start: 2023-12-20 | End: 2023-12-20

## 2023-12-20 RX ORDER — MORPHINE SULFATE 10 MG/ML
4 INJECTION INTRAMUSCULAR; INTRAVENOUS; SUBCUTANEOUS EVERY 6 HOURS PRN
Status: DISCONTINUED | OUTPATIENT
Start: 2023-12-20 | End: 2023-12-20 | Stop reason: HOSPADM

## 2023-12-20 RX ORDER — MUPIROCIN 20 MG/G
OINTMENT TOPICAL
Status: DISCONTINUED | OUTPATIENT
Start: 2023-12-20 | End: 2023-12-20 | Stop reason: HOSPADM

## 2023-12-20 RX ORDER — KETOROLAC TROMETHAMINE 30 MG/ML
15 INJECTION, SOLUTION INTRAMUSCULAR; INTRAVENOUS ONCE
Status: DISCONTINUED | OUTPATIENT
Start: 2023-12-20 | End: 2023-12-20 | Stop reason: HOSPADM

## 2023-12-20 RX ORDER — ONDANSETRON 2 MG/ML
INJECTION INTRAMUSCULAR; INTRAVENOUS
Status: DISCONTINUED | OUTPATIENT
Start: 2023-12-20 | End: 2023-12-20

## 2023-12-20 RX ORDER — PROPOFOL 10 MG/ML
VIAL (ML) INTRAVENOUS
Status: DISCONTINUED | OUTPATIENT
Start: 2023-12-20 | End: 2023-12-20

## 2023-12-20 RX ORDER — ACETAMINOPHEN 10 MG/ML
INJECTION, SOLUTION INTRAVENOUS
Status: DISCONTINUED | OUTPATIENT
Start: 2023-12-20 | End: 2023-12-20

## 2023-12-20 RX ADMIN — HYDROMORPHONE HYDROCHLORIDE 0.5 MG: 2 INJECTION, SOLUTION INTRAMUSCULAR; INTRAVENOUS; SUBCUTANEOUS at 08:12

## 2023-12-20 RX ADMIN — DEXAMETHASONE SODIUM PHOSPHATE 8 MG: 4 INJECTION, SOLUTION INTRA-ARTICULAR; INTRALESIONAL; INTRAMUSCULAR; INTRAVENOUS; SOFT TISSUE at 07:12

## 2023-12-20 RX ADMIN — MUPIROCIN: 20 OINTMENT TOPICAL at 05:12

## 2023-12-20 RX ADMIN — MIDAZOLAM HYDROCHLORIDE 2 MG: 1 INJECTION, SOLUTION INTRAMUSCULAR; INTRAVENOUS at 07:12

## 2023-12-20 RX ADMIN — ROCURONIUM BROMIDE 40 MG: 10 SOLUTION INTRAVENOUS at 07:12

## 2023-12-20 RX ADMIN — ACETAMINOPHEN 1000 MG: 10 INJECTION, SOLUTION INTRAVENOUS at 07:12

## 2023-12-20 RX ADMIN — SUGAMMADEX 200 MG: 100 INJECTION, SOLUTION INTRAVENOUS at 08:12

## 2023-12-20 RX ADMIN — SODIUM CHLORIDE, SODIUM GLUCONATE, SODIUM ACETATE, POTASSIUM CHLORIDE AND MAGNESIUM CHLORIDE: 526; 502; 368; 37; 30 INJECTION, SOLUTION INTRAVENOUS at 07:12

## 2023-12-20 RX ADMIN — KETOROLAC TROMETHAMINE 15 MG: 30 INJECTION, SOLUTION INTRAMUSCULAR; INTRAVENOUS at 07:12

## 2023-12-20 RX ADMIN — ONDANSETRON 4 MG: 2 INJECTION INTRAMUSCULAR; INTRAVENOUS at 07:12

## 2023-12-20 RX ADMIN — FENTANYL CITRATE 50 MCG: 50 INJECTION, SOLUTION INTRAMUSCULAR; INTRAVENOUS at 07:12

## 2023-12-20 RX ADMIN — CEFAZOLIN SODIUM 2 G: 2 SOLUTION INTRAVENOUS at 07:12

## 2023-12-20 RX ADMIN — PROPOFOL 200 MG: 10 INJECTION, EMULSION INTRAVENOUS at 07:12

## 2023-12-20 NOTE — LETTER
VINCENT Price,         The above named patient is scheduled to have a     On ***.       *Type of Anesthesia: ***     This patient needs surgical clearance from your office for this procedure.  Please perform necessary tests in order for this patient to be medically cleared for surgery. Please send or fax the clearance letter with most recent ECHO, EKG or stress test, to our office as soon as possible.     Request to Hold  *** for  *** days prior to procedure.    Our fax number is [phone].            We appreciate your help in getting our patient cleared for surgery.  Please feel free to call our office should you have any questions, [phone]

## 2023-12-20 NOTE — DISCHARGE SUMMARY
[facility] - Periop Services  Discharge Note  Short Stay    Procedure(s) (LRB):  REMOVAL,ORTHOPEDIC HARDWARE,UPPER EXTREMITY (Left)      OUTCOME: Patient tolerated treatment/procedure well without complication and is now ready for discharge.    DISPOSITION: Home or Self Care    FINAL DIAGNOSIS:  <principal problem not specified>    FOLLOWUP: In clinic    DISCHARGE INSTRUCTIONS:    Discharge Procedure Orders   Notify your health care provider if you experience any of the following:  temperature >100.4     Notify your health care provider if you experience any of the following:  persistent nausea and vomiting or diarrhea     Notify your health care provider if you experience any of the following:  severe uncontrolled pain     Notify your health care provider if you experience any of the following:  redness, tenderness, or signs of infection (pain, swelling, redness, odor or green/yellow discharge around incision site)     Remove dressing in 48 hours   Order Comments: Begin daily dry dressing changes POD2. May cover with soft dressing or large band aid. Keep clean and dry. No showers to POD 7. Do not submerge. Do not apply ointments or creams.     Weight bearing restrictions (specify):   Order Comments: JUSTIN ALVARADO        TIME SPENT ON DISCHARGE: 15 minutes    The above findings, diagnostics, and treatment plan were discussed with Dr. Goodwin who is in agreement with the plan of care except as stated in additional documentation.     Yolanda Santamaria PA-C  [facility]   Orthopedic Trauma

## 2023-12-20 NOTE — LETTER
VINCENT Sandoval  ***,         The above named patient is scheduled to have a ***    On ***.       *Type of Anesthesia: ***     This patient needs surgical clearance from your office for this procedure.  Please perform necessary tests in order for this patient to be medically cleared for surgery. Please send or fax the clearance letter with most recent ECHO, EKG or stress test, to our office as soon as possible.     Request to Hold  *** for  *** days prior to procedure.    Our fax number is [phone].            We appreciate your help in getting our patient cleared for surgery.  Please feel free to call our office should you have any questions, [phone]

## 2023-12-20 NOTE — INTERVAL H&P NOTE
The patient has been examined and the H&P has been reviewed:    I concur with the findings and no changes have occurred since H&P was written.    Surgery risks, benefits and alternative options discussed and understood by patient/family.    Patient understands the severity of the injury.  CT scan reviewed.  Possible nonunion.  This may need volar fixation which she is understandable.  We will test her intraoperatively for this procedure.  Continue with removal of hardware of the bridge plate at 3 months to avoid catastrophic failure.    I explained that surgery and the nature of their condition are not without risks. These include, but are not limited to, bleeding, infection, neurovascular compromise, malunion, nonunion, hardware complications, wound complications, scarring, cosmetic defects, need for later and/or repeated surgeries, avascular necrosis, bone death due to initial trauma, pain, loss of ROM, loss of function, PTOA, deformity, stance/gait and/or functional abnormalities, thromboembolic complications, compartment syndrome, loss of limb, loss of life, anesthetic complications, and other imponderables. I explained that these can occur despite the adequacy of treatments rendered, and that their risks are heightened given the nature of their condition. They verbalized understanding. They would like to continue with surgery at this time. If appropriate family was involved with surgical discussion.     \This note/OR report was created with the assistance of  voice recognition software or phone  dictation.  There may be transcription errors as a result of using this technology however minimal. Effort has been made to assure accuracy of transcription but any obvious errors or omissions should be clarified with the author of the document.       Keith Goodwin, DO  Orthopedic Trauma Surgery       There are no hospital problems to display for this patient.

## 2023-12-20 NOTE — ANESTHESIA PROCEDURE NOTES
Intubation    Date/Time: 12/20/2023 7:20 AM    Performed by: Rinku Johnson CRNA  Authorized by: Brenda Reyes MD    Intubation:     Induction:  Intravenous    Intubated:  Postinduction    Mask Ventilation:  Easy with oral airway    Attempts:  1    Attempted By:  CRNA    Method of Intubation:  Direct    Blade:  Arriaza 2    Laryngeal View Grade: Grade IIA - cords partially seen      Difficult Airway Encountered?: No      Complications:  None    Airway Device:  Oral endotracheal tube    Airway Device Size:  7.5    Style/Cuff Inflation:  Cuffed    Inflation Amount (mL):  5    Tube secured:  22    Secured at:  The lips    Placement Verified By:  Capnometry    Complicating Factors:  None    Findings Post-Intubation:  BS equal bilateral and atraumatic/condition of teeth unchanged

## 2023-12-20 NOTE — OP NOTE
OPERATIVE REPORT    Patient: Lauren Kaba   : 1960    MRN: [id number]  Date: 2023      Surgeon:Keith Goodwin DO  Assistant: Matt Santamaria was essential, part of the procedure including deep hardware placement and deep closure.  No senior assistant or resident was availible  Preoperative Diagnosis:  Left wrist symptomatic implant, deep, left distal radius nonunion, Left index and middle finger extensor tenosynovitis  Postoperative Diagnosis: Same    Procedure:    Repair of nonunion left distal radius 56520  Removal Left wrist deep implant under anesthesia -   Tenolysis Leftindex finger CPT 12240  Tenolysis Left middle finger CPT 93265    Anesthesiologist: Brenda Reyes MD  OR Staff: Circulator: Tracey Montalvo RN  Radiology Technologist: Danny Fraser RT  Scrub Person: Rebeca Bhatia ST  Implants:   Implant Name Type Inv. Item Serial No.  Lot No. LRB No. Used Action   PLATE GEMINUS DORSAL SPANNING - [id number]  PLATE GEMINUS DORSAL SPANNING  [employer]  Left 1 Explanted   SCREW BONE COMP TI 8X3MM - [id number]  SCREW BONE COMP TI 8X3MM  [employer]  Left 1 Explanted   SCREW BONE COMPR 14X3MM - [id number]  SCREW BONE COMPR 14X3MM  [employer]  Left 1 Explanted   SCREW BONE LOCK TI 10X3MM - [id number]  SCREW BONE LOCK TI 10X3MM  [employer]  Left 2 Explanted   SCREW, MULTI-THREAD, LOCKING, 3.0MM X 8MM, TI    [employer]  Left 1 Explanted   SCREW BONE TI LOCK 12X3MM - [id number]  SCREW BONE TI LOCK 12X3MM  [employer]  Left 2 Explanted   PLATE GEMINUS STD RADIAL LEFT - [id number]  PLATE GEMINUS STD RADIAL LEFT  [employer]  Left 1 Implanted   SCREW GEMINUS NLOK 3.5X12MM - [id number]  SCREW GEMINUS NLOK 3.5X12MM  [employer]  Left 1 Implanted     EBL: 50cc  Complications: None  Disposition: To PACU, stable    Indications: Lauren Kaba is a 63 y.o. female presenting  with the aforementioned injuries/findings. The procedure is indicated to best alleviate symptoms of the implant remaining in place and nonunion of the distal radius.  The patient is awake and alert. After thorough discussion of the risks, benefits, expected outcomes, and alternatives to surgical intervention, the patient agreed to proceed with surgical treatment.  Specific risks discussed included, but were not limited to: superficial or deep infection, wound healing complications, DVT/PE, significant bleeding requiring transfusion, damage to named anatomic structures in the immediate area including named neurovascular structures, failure to alleviate symptoms, refracture, and general risks of anesthesia.  The patient voiced understanding and written as well as verbal consent was obtained by myself prior to the procedure.      Patient has severe intra-articular distal radius fracture of the left.  She was originally treated with a bridge plate which has gone on to do well currently CT scan shows a nonunion at the distal radius articular segment does appear to be healed.  We will plan for today for removal of hardware deep free up the extensor tendons and repair of the nonunion.    Procedure Note:  The patient was brought back to the OR and placed supine on the OR table. After successful induction of anesthesia by anesthesia staff, the patient was positioned in the supine position and all bony prominences were padded appropriately.  The surgical field was then provisionally cleansed and then prepped and draped in the usual sterile fashion.    At this time a time-out was performed, with the correct patient, site, and procedure identified.  The universal time out as well as sign your site protocols were followed.  Preoperative antibiotics were verified as administered.     The previous surgical sites were utilized.  Attention to hemostasis was paid using electrocautery.  We were able to dissect down to the implant on  the Left radius and metacarpal without difficulty.  The appropriate manual screwdrivers were utilized to remove the implant(s) without difficulty.  Final C-arm images were obtained and saved to the [employer] system after the implant(s) were removed.    Attention is now drawn volarly.  I completed a standard FCR approach to the distal radius.  Bluntly dissected down to the quadratus muscle which was then incised on the radial aspect in the retracted ulnarly.  I then appreciate the nonunion site we then freed up the nonunion site and placed a volar plate appropriate position on intraoperative fluoroscopy completing the open reduction internal fixation.    We then manipulated the carpus and use the dull end of a [place] to free the extensor tendons to the index and middle fingers.  We then manipulated the fingers to improve range of motion    The incisions were then irrigated using copious sterile saline. Vancomycin was in bed placed in the wound then closed in layered fashion.  The surgical sites were sterilely cleansed and dressed.    The patient was then subsequently extubated and transferred to to PACU in a stable condition.    All sponge and needle counts were correct at the end of the case.  I was present and participated in all aspects of the procedure.    Prognosis:  The patient will be kept NWB ROMAT 4 weeks.  Patient understands that he may have residual stiffness following the procedure.        This note/OR report was created with the assistance of  voice recognition software or phone  dictation.  There may be transcription errors as a result of using this technology however minimal. Effort has been made to assure accuracy of transcription but any obvious errors or omissions should be clarified with the author of the document.       Keith Goodwin, DO  Orthopedic Trauma Surgery

## 2023-12-20 NOTE — TRANSFER OF CARE
"Anesthesia Transfer of Care Note    Patient: Laruen Kaba    Procedure(s) Performed: Procedure(s) (LRB):  REMOVAL,ORTHOPEDIC HARDWARE,UPPER EXTREMITY (Left)    Patient location: PACU    Anesthesia Type: general    Transport from OR: Transported from OR on room air with adequate spontaneous ventilation    Post pain: adequate analgesia    Post assessment: no apparent anesthetic complications    Post vital signs: stable    Level of consciousness: awake and responds to stimulation    Nausea/Vomiting: no nausea/vomiting    Complications: none    Transfer of care protocol was followed      Last vitals: Visit Vitals  /61   Pulse 67   Temp 36.9 °C (98.4 °F) (Oral)   Resp 20   Ht 5' 4" (1.626 m)   Wt 108.7 kg (239 lb 10.2 oz)   SpO2 97%   Breastfeeding No   BMI 41.13 kg/m²     "

## 2023-12-20 NOTE — DISCHARGE INSTRUCTIONS
-NO driving and NO alcohol consumption for 24 hours and while taking narcotic pain medication.    -Follow up appointment scheduled for: January 8, 2024 @ 1:00 pm    -Wound care:Begin daily dry dressing changes Post op day 2. May cover with soft dressing or large band aid. Keep clean and dry. No showers to Post op day 7. Do not submerge. Do not apply ointments or creams.     -Monitor for signs of INFECTION: redness, swelling, drainage/pus/foul odor, fever, chills. Also, monitor extremity for good circulation (pink, warm, etc)    - Weight bearing status: Non weight bearing. Do not use.     -Apply ICE and ELEVATE extremity as needed to aid in pain and inflammation.    -Report to your nearest ER/Notify your doctor if you experience any SUDDEN/SEVERE chest pain/abdominal pain, weakness, trouble breathing, uncontrolled pain.

## 2023-12-20 NOTE — LETTER
Lauren Kaba  1960    Dr. Sukhdev Price,         The above named patient is scheduled to have a left wrist bridge plate removal     On 12/20/23.       *Type of Anesthesia: general      This patient needs surgical clearance from your office for this procedure.  Please perform necessary tests in order for this patient to be medically cleared for surgery. Please send or fax the clearance letter with most recent ECHO, EKG or stress test, to our office as soon as possible.     Request to Hold  _ for  _ days prior to procedure.    Our fax number is [phone].            We appreciate your help in getting our patient cleared for surgery.  Please feel free to call our office should you have any questions, [phone].      Keith Goodwin DO

## 2023-12-20 NOTE — ANESTHESIA PREPROCEDURE EVALUATION
12/20/2023  Lauren Kaba is a 63 y.o., female.  3.5mo post op visit ORIF left radius fracture and ulna fracture. Doing well overall. Has been working on ROM of fingers. Has dorsal bridge plate.   A-fib, on Cardizem  DCCV x2  Echo- EF 45-50%  No sig valve dz  Pre-op Assessment    I have reviewed the Patient Summary Reports.     I have reviewed the Nursing Notes. I have reviewed the NPO Status.   I have reviewed the Medications.     Review of Systems  Anesthesia Hx:  No problems with previous Anesthesia                Social:  Non-Smoker       Cardiovascular:         Dysrhythmias                                Atrial Fibrillation     Pulmonary:        Sleep Apnea     Obstructive Sleep Apnea (EARNEST).           Endocrine:  Diabetes    Diabetes                          Physical Exam  General: Well nourished, Cooperative, Alert and Oriented    Airway:  Mallampati: II   Mouth Opening: Normal  TM Distance: Normal  Tongue: Normal  Neck ROM: Normal ROM    Dental:  Intact        Anesthesia Plan  Type of Anesthesia, risks & benefits discussed:    Anesthesia Type: Gen ETT  Intra-op Monitoring Plan: Standard ASA Monitors  Post Op Pain Control Plan: multimodal analgesia  Induction:  IV  Airway Plan: Direct, Post-Induction  Informed Consent: Informed consent signed with the Patient and all parties understand the risks and agree with anesthesia plan.  All questions answered. Patient consented to blood products? Yes  ASA Score: 3  Day of Surgery Review of History & Physical: H&P Update referred to the surgeon/provider.    Ready For Surgery From Anesthesia Perspective.     .

## 2023-12-20 NOTE — LETTER
[facility] FORM 1010 - REQUEST OF AUTHORIZATION/CARRIER OR SELF INSURED EMPLOYER RESPONSE  PLEASE PRINT OR TYPE  SECTION 1. IDENTIFYING INFORMATION - To Be Filled Out By Health Care Provider    P  A Last Name:   Sendy First:   Lauren Middle:   [address] Address, [facility], Zip:   [address] [facility] [address]   T  I Last 4 Digits of Social Security Number:   [id number] YOB: 1960 Phone Number:    [phone] (home) [phone] (work) Date of Injury:    08/23/23   E  N  T Employers Name:     [employer]     Street Address, City, State, Zip:  [address] [place] [address] Phone Number:    [phone]     C  A  R  R Name:   [employer]. RISK MANAGEMENT SERV. :   GABRIELA TREVINO  Claim Number (if known):    [id number]     I  [address]  R [address] Address, City, State, Zip:   [address] [address] [facility] [address] Email Address:    Phone Number:   [phone] Fax Number:   [phone]   SECTION 2. REQUEST FOR AUTHORIZATION - To Be Filled Out By Health Care Provider      P Requesting Health Care Provider:   MICHEAL VEGA DO  Phone Number:  [phone] Fax Number:   [phone]   R  [address] Address, City, State, Zip:   [address] [facility] [address] [facility] [address] Email:    [email or web address]   V  I Diagnosis:   OPEN FRACTURE LEFT DISTAL RADIUS  CPT/DRG Code:   *** ICD/DSM Code:   ***   D  E Requested Treatment or Testing (Attach Supplement If Needed): ***   R Reason for Treatment or Testing (Attach Supplement If Needed): ***   INFORMATION REQUIRED BY RULE TO BE INCLUDED WITH REQUEST FOR AUTHORIZATION - To Be Filled Out By Health Care Provider  (Following is the required minimum information for Request of Authorization (LAC 40:2715 (C))                    []  History provided to the level of condition and as provided by Medical Treatment Schedule   P                  []  Physical Findings/Clinical Tests   R                 []  Documented functional improvements from prior  treatment   O                 []  Test/imaging results   V                 []  Treatment Plan including services being requested along with the frequency and duration   I  D  E                                                                                                                                            []     Faxed          to the Carrier/Self Insured Employer on this the      I hereby certify that this completed form and above required information was                                                           ***     day of   ***,               ***                                                                                                                             []      Emailed                  (day)                 (month)         (year)   R Signature of Health Care Provider:    *** - *** - per attached records Printed Name:    *** - *** - per attached records   SECTION 3. RESPONSE OF CARRIER/SELF INSURED EMPLOYER FOR AUTHORIZATION  (Check appropriate box below and return to requesting Health Care Provider, Claimant and Claimant  as provided by rule)       []  The requested Treatment or Testing is approved       []  The requested Treatment or Testing is approved with modifications (Attach summary of reasons and explanation of any modifications)       []  The requested Treatment or Testing is denied because                                       []          Not in accordance with Medical Treatment Schedule or R.S.23:1203.1(D) (Attach summary of reasons)                                       []          The request, or a portion thereof, is not related to the on-the-job injury                                       []          The claim is being denied as non-compensable                                       []          Other (Attach brief explanation)   C  A  R  R  I                                                                                                                                             []     Faxed          to the Health Care Provider (and to the  of                                                                                                                                                                             Claimant if one exists, if denied or approved with   I hereby certify that this response of  [employer]/Self Insured Employer for Authorization was                                                 modification) on this the                                                                                                                                                                                     ______  day of   _______ ,   _______                                                                                                                             []      Emailed                  (day)                 (month)         (year)   E  R Signature of Carrier/Self Insured Employer or Utilization Review Company: Printed Name:           []   The prior denied or approved with modification request is now  approved                                                                                                                                               []     Faxed          to the Health Care Provider and  of Claimant                                                                                                                                                                                                    if one exists on this the   I hereby certify that this response of  Carrier/Self Insured Employer for Authorization was                                      ______  day of   _______ ,   _______                                                                                                                                             []      Emailed                      (day)                 (month)         (year)    Signature of Carrier/Self Insured  Employer or Utilization Review Company:  Printed Name:       SECTION 4. FIRST REQUEST   (Form 1010A is required to be filled out by Carrier/Self Insured Employer and Health Care Provider)   C           []  The requested Treatment or Testing is delayed because minimum information required by rule was not provided   A  R  R  I                                                                                                                                            []     Faxed                 to the Health Care Provider on this the      I hereby certify that this First Request and accompanying Form 1010A was                                                       ______  day of   _______ ,   _______                                                                                                                             []      Emailed                  (day)                 (month)         (year)   E  R Signature of Carrier/Self Insured Employer or Utilization Review Company:     P  R  O  V  I  D                                                                                                                                            []     Faxed          to the Carrier/Self Insured Employer on this the                                I hereby certify that a response to the First Request and                                               accompanying Form 1010A was                                                                                 ______  day of   _______ ,   _______                                                                                                                             []      Emailed                  (day)                 (month)         (year)   E  R Signature of Health Care Provider: Printed Name:   SECTION 5. SUSPENSION OF PRIOR AUTHORIZATION DUE TO LACK OF INFORMATION       C   Suspension of Prior Authorization Process due to Lack of Information     A  R           []  The requested  Treatment or Testing is delayed due to a Suspension of Prior Authorization Due to Lack of Information   R  I  E                                                                                                                           []     Faxed                 to the Health Care Provider on this the      I hereby certify that this Suspension of Prior Authorization was                                                       ______  day of   _______ ,   _______                                                                                                                            []      Emailed                  (day)                 (month)         (year)   R Signature of Carrier/Self Insured Employer or Utilization Review Company:   Printed Name:       P    Appeal of Suspension to Medical Services Section by Health Care Provider     R  O  V  I hereby certify that this form and all information previously submitted to [employer]/Self Insured Employer   was faxed to [employer]  (Fax Number: [phone] this ______  day of   _______ ,   _______   I  D  E                                                                                                                           []     Faxed       to the Carrier/Self Insured Employer on this the      I hereby certify that this Appeal of Suspension of Prior Authorization was                                        ______  day of   _______ ,   _______                                                                                                                            []      Emailed                  (day)                 (month)         (year)   R Signature of Health Care Provider:   Printed Name:     SECTION 6. DETERMINATION OF MEDICAL SERVICES SECTION              []  The required information of LAC40:2715(C) was not provided              []  The required information of LAC40:2715(C) was provided   O  DUANE ENCARNACION                                                                                                                            []     Faxed           to the Health Care Provider  & [employer]/Self                                                                                                                                                                       Insured Employer on this the                      I hereby certify that a written determination was                                                                ______  day of   _______ ,   _______                                                                                                                            []      Emailed                  (day)                 (month)         (year)    Signature: Printed Name:     SECTION 7. HEALTH CARE PROVIDER RESPONSE TO MEDICAL SERVICES DETERMINATION   P  R  O  V  I  D                                                                                                                             []     Faxed       to the Car[employer]/Self Insured Employer on this the   I hereby certify that additional information, pursuant to the determination of                                       Medical Services Section, was                                    []      Emailed              ______  day of   _______ ,   _______                                                                                                                                                                     (day)                 (month)         (year)   E  R Signature of Health Care Provider: Printed Name:

## 2023-12-21 VITALS
HEIGHT: 64 IN | DIASTOLIC BLOOD PRESSURE: 76 MMHG | OXYGEN SATURATION: 93 % | HEART RATE: 61 BPM | RESPIRATION RATE: 13 BRPM | WEIGHT: 239.63 LBS | SYSTOLIC BLOOD PRESSURE: 131 MMHG | BODY MASS INDEX: 40.91 KG/M2 | TEMPERATURE: 97 F

## 2023-12-22 RX ORDER — ROSUVASTATIN CALCIUM 20 MG/1
20 TABLET, COATED ORAL NIGHTLY
COMMUNITY
Start: 2023-12-19

## 2023-12-26 DIAGNOSIS — M77.9 INFLAMMATION AROUND JOINT: ICD-10-CM

## 2023-12-26 RX ORDER — CELECOXIB 200 MG/1
CAPSULE ORAL
Qty: 30 CAPSULE | Refills: 5 | Status: SHIPPED | OUTPATIENT
Start: 2023-12-26

## 2023-12-26 NOTE — ANESTHESIA POSTPROCEDURE EVALUATION
Anesthesia Post Evaluation    Patient: Lauren Kaba    Procedure(s) Performed: Procedure(s) (LRB):  REMOVAL,ORTHOPEDIC HARDWARE,UPPER EXTREMITY (Left)  ORIF, WRIST (Left)    Final Anesthesia Type: general      Patient location during evaluation: PACU  Patient participation: Yes- Able to Participate  Level of consciousness: awake and alert  Post-procedure vital signs: reviewed and stable  Pain management: adequate  Airway patency: patent      Anesthetic complications: no      Cardiovascular status: hemodynamically stable  Respiratory status: unassisted  Hydration status: euvolemic  Follow-up not needed.              Vitals Value Taken Time   /76 12/20/23 1027   Temp 36.3 °C (97.4 °F) 12/20/23 0850   Pulse 61 12/20/23 1027   Resp 13 12/20/23 0935   SpO2 93 % 12/20/23 1027   Vitals shown include unvalidated device data.      Event Time   Out of Recovery 09:36:00         Pain/Margy Score: No data recorded

## 2024-01-08 ENCOUNTER — HOSPITAL ENCOUNTER (OUTPATIENT)
Dept: RADIOLOGY | Facility: CLINIC | Age: 64
Discharge: HOME | End: 2024-01-08
Attending: PHYSICIAN ASSISTANT
Payer: COMMERCIAL

## 2024-01-08 ENCOUNTER — OFFICE VISIT (OUTPATIENT)
Dept: ORTHOPEDICS | Facility: CLINIC | Age: 64
End: 2024-01-08
Payer: OTHER MISCELLANEOUS

## 2024-01-08 VITALS
HEIGHT: 64 IN | BODY MASS INDEX: 40.8 KG/M2 | WEIGHT: 239 LBS | DIASTOLIC BLOOD PRESSURE: 78 MMHG | SYSTOLIC BLOOD PRESSURE: 130 MMHG | HEART RATE: 75 BPM

## 2024-01-08 DIAGNOSIS — S52.502E TYPE I OR II OPEN FRACTURE OF DISTAL END OF LEFT RADIUS WITH ROUTINE HEALING, UNSPECIFIED FRACTURE MORPHOLOGY, SUBSEQUENT ENCOUNTER: Primary | ICD-10-CM

## 2024-01-08 DIAGNOSIS — S52.502E TYPE I OR II OPEN FRACTURE OF DISTAL END OF LEFT RADIUS WITH ROUTINE HEALING, UNSPECIFIED FRACTURE MORPHOLOGY, SUBSEQUENT ENCOUNTER: ICD-10-CM

## 2024-01-08 PROCEDURE — 73110 X-RAY EXAM OF WRIST: CPT | Mod: LT,,, | Performed by: PHYSICIAN ASSISTANT

## 2024-01-08 PROCEDURE — 99024 POSTOP FOLLOW-UP VISIT: CPT | Mod: ,,, | Performed by: ORTHOPAEDIC SURGERY

## 2024-01-08 NOTE — LETTER
[facility]  [address] [place] [address]. [address], [address]  Phone: [phone]  Fax: [phone]    Name:Lauren Kaba  :1960   Date:2024       PATIENT IS ABLE TO RETURN TO WORK AS OF: 01/15/2024    [_] SEDENTARY WORK: Lifting 10 pounds maximum and occasionally lifting and/or carrying articles such as dockers, ledgers and small tools.  Although a sedentary job is defined as one which involved sitting, a certain amount of walking and standing are required only occasionally and other sedentary criteria are met.    [_] LIGHT WORK: Lifting 20 pounds with frequent lifting and/or carrying objects weighing up to 10 pounds.  Even though the weight lifted may be only a negotiable amount, a job is in the category when it involves sitting most of the time with a degree of pushing/pulling of arm and/or leg controls.    [_] MEDIUM WORK: Lifting of 50 pounds maximum with frequent lifting and/or carrying of objects up to 25 pounds.    [_] HEAVY WORK: Lifting of 100 pounds maximum with frequent lifting and/or carrying objects up to 50 pounds.    [_] VERY HEAVY WORK: Lifting objects in excess of 100 pounds with frequent lifting and/or carrying of objects weighing 50 pounds or more.    [x] REGULAR DUTY: [x] No Restrictions. [_] With Restrictions (See comments below0:    COMMENTS    Alexandra Blair Lemaire, PA-C [facility]   Orthopedic Trauma

## 2024-01-08 NOTE — PROGRESS NOTES
Subjective:       Patient ID: Lauren Kaba is a 63 y.o. female.  Chief Complaint   Patient presents with    Left Wrist - Post-op Evaluation     2.5 week f/u from hw removal left wrist. Denies increase in pain or discomfort.         Follow-up        Patient presents for 2.5week post op visit ORIF left radius fracture and ulna fracture. Doing well overall.  She would like to return to work.  She will return work on Monday.  No dull achy pain.  No fevers or chills.  Some expected stiffness.  No numbness or tingling.  ROS:  Constitutional: Denies fever chills  Eyes: No change in vision  ENT: No ringing or current infections  CV: No chest pain  Resp: No labored breathing  MSK: Pain evident at site of injury located in HPI,   Integ: No signs of abrasions or lacerations  Neuro: No numbness or tingling  Lymphatic: No swelling outside the area of injury     Current Outpatient Medications on File Prior to Visit   Medication Sig Dispense Refill    ACCU-CHEK GUIDE TEST STRIPS Strp USE TO TEST BLOOD SUGAR TWO TIMES A  strip 11    acetaminophen (TYLENOL) 500 MG tablet Take 1,000 mg by mouth every 6 (six) hours as needed for Pain.      apixaban (ELIQUIS) 5 mg Tab Take 5 mg by mouth 2 (two) times daily.      ascorbic acid, vitamin C, (VITAMIN C) 500 MG tablet Take 500 mg by mouth once daily.      Bifidobacterium infantis (ALIGN ORAL) Take 1 capsule by mouth once daily.      celecoxib (CELEBREX) 200 MG capsule TAKE ONE CAPSULE BY MOUTH EVERY DAY 30 capsule 5    diltiaZEM (CARDIZEM CD) 120 MG Cp24 Take 240 mg by mouth 2 (two) times a day.      dulaglutide (TRULICITY) 1.5 mg/0.5 mL pen injector Inject 1.5 mg into the skin every 7 days. 4 pen 5    fexofenadine (ALLEGRA) 180 MG tablet Take 180 mg by mouth every evening.      fluticasone propionate (FLONASE) 50 mcg/actuation nasal spray 2 sprays by Each Nostril route nightly as needed for Allergies or Rhinitis.      folic acid/multivit-min/lutein (CENTRUM SILVER ORAL)  "Take 1 tablet by mouth once daily.      glucosamine HCl (GLUCOSAMINE, BULK, MISC) Take by mouth 2 (two) times a day.      metFORMIN (GLUCOPHAGE-XR) 500 MG ER 24hr tablet TAKE ONE TABLET BY MOUTH TWICE A DAY 60 tablet 6    omega 3-dha-epa-fish oil 1,200 (144-216) mg Cap Take 1 capsule by mouth 2 (two) times a day.      rosuvastatin (CRESTOR) 20 MG tablet Take 20 mg by mouth every evening.      sotaloL (BETAPACE) 160 MG Tab Take 160 mg by mouth 2 (two) times daily.      vitamin D (VITAMIN D3) 1000 units Tab Take 1,000 Units by mouth once daily.       Current Facility-Administered Medications on File Prior to Visit   Medication Dose Route Frequency Provider Last Rate Last Admin    sodium chloride 0.9% flush 10 mL  10 mL Intravenous PRN Yolanda Santamaria PA-C              Objective:      /78   Pulse 75   Ht 5' 4" (1.626 m)   Wt 108.4 kg (239 lb)   BMI 41.02 kg/m²   Physical Exam  General the patient is alert and oriented x3 no acute distress nontoxic-appearing appropriate affect.    Constitutional: Vital signs are examined and stable.  Resp: No signs of labored breathing    Musculoskeletal:     Left upper extremity: incisions healing well without erythema, drainage, signs of dehiscence, Sutures removed today without complication; compartments are soft and compressible; no wrist ROM attempted, FROM at the elbow, some stiffness at the digits but able to flex and extend; moderate tenderness to palpation; AIN/PIN/ulna motor intact; SILT distally; BCR distally; Radial pulse palpable near full range of motion of the fingers      Body mass index is 41.02 kg/m².  Ideal body weight: 54.7 kg (120 lb 9.5 oz)  Adjusted ideal body weight: 76.2 kg (167 lb 15.3 oz)  Hemoglobin A1c   Date Value Ref Range Status   04/03/2023 6.2 <=7.0 % Final   10/12/2022 6.2 <=7.0 % Final     Hgb   Date Value Ref Range Status   12/20/2023 12.2 12.0 - 16.0 g/dL Final   08/25/2023 12.1 12.0 - 16.0 g/dL Final     Hct   Date Value Ref Range " "Status   12/20/2023 37.1 37.0 - 47.0 % Final   08/25/2023 35.4 (L) 37.0 - 47.0 % Final     No results found for: "IRON"  No components found for: "FROLATE"  No results found for: "[id number]"  WBC   Date Value Ref Range Status   12/20/2023 6.95 4.50 - 11.50 x10(3)/mcL Final   08/25/2023 9.35 4.50 - 11.50 x10(3)/mcL Final       Radiology: 3 view x ray of the left distal radius: bridge plate in place to the left radius and ulna without signs of loosening or failure. Fracture alignment maintained as compared to previous images.  Some osteolysis dorsally        Assessment:         1. Type I or II open fracture of distal end of left radius with routine healing, unspecified fracture morphology, subsequent encounter  X-Ray Wrist Complete Left    Ambulatory referral/consult to Physical/Occupational Therapy    CANCELED: X-Ray Wrist 2 View Left              Plan:         No follow-ups on file.    Lauren was seen today for post-op evaluation.    Diagnoses and all orders for this visit:    Type I or II open fracture of distal end of left radius with routine healing, unspecified fracture morphology, subsequent encounter  -     X-Ray Wrist Complete Left; Future  -     Cancel: X-Ray Wrist 2 View Left; Future  -     Ambulatory referral/consult to Physical/Occupational Therapy; Future        Patient doing very well.  We ended up placing a volar distal radius plate to support the fracture malunion.  Patient is doing very well.  We have discussed the care and her x-rays in great detail greater than 30 minutes.  Patient has sutures removed without complication.  We will have her start physical therapy immediately.  Have her follow up in 6 weeks to ensure that she is gaining more function of her wrist.  She is very happy with the care at this time.  We discussed posttraumatic arthritis we discussed osteonecrosis of the bone we discussed possible nonunion.    This note/OR report was created with the assistance of  voice recognition " software or phone  dictation.  There may be transcription errors as a result of using this technology however minimal. Effort has been made to assure accuracy of transcription but any obvious errors or omissions should be clarified with the author of the document.       Keith Goodwin DO  Orthopedic Trauma Surgery       Future Appointments   Date Time Provider Department Center   1/8/2024  1:00 PM Yolanda Santamaria PA-C [facility] [place]   2/21/2024 10:45 AM Keith Goodwin DO [facility] [place]

## 2024-01-24 DIAGNOSIS — E11.9 TYPE 2 DIABETES MELLITUS WITHOUT COMPLICATION, WITHOUT LONG-TERM CURRENT USE OF INSULIN: ICD-10-CM

## 2024-01-24 RX ORDER — METFORMIN HYDROCHLORIDE 500 MG/1
TABLET, EXTENDED RELEASE ORAL
Qty: 60 TABLET | Refills: 6 | Status: SHIPPED | OUTPATIENT
Start: 2024-01-24

## 2024-02-06 ENCOUNTER — TELEPHONE (OUTPATIENT)
Dept: INTERNAL MEDICINE | Facility: CLINIC | Age: 64
End: 2024-02-06
Payer: COMMERCIAL

## 2024-02-06 NOTE — TELEPHONE ENCOUNTER
----- Message from Analy Arriaza sent at 2/6/2024  9:39 AM CST -----  .Type:  RX Refill Request    Who Called: Lauren  Refill or New Rx:New RX  RX Name and Strength:dulaglutide (TRULICITY) 1.5 mg/0.5 mL pen injector  How is the patient currently taking it? (ex. 1XDay):1/WEEK  Is this a 30 day or 90 day RX:30  Preferred Pharmacy with phone number:[facility] [address]  Local or Mail Order:Local  Ordering Provider:Linda  Would the patient rather a call back or a response via [facility]?   Best Call Back Number:[phone]  Additional Information: dulaglutide (TRULICITY) 1.5 mg/0.5 mL pen injector    Please send prior authorization and please call patient back

## 2024-02-07 ENCOUNTER — TELEPHONE (OUTPATIENT)
Dept: INTERNAL MEDICINE | Facility: CLINIC | Age: 64
End: 2024-02-07
Payer: COMMERCIAL

## 2024-02-09 ENCOUNTER — TELEPHONE (OUTPATIENT)
Dept: INTERNAL MEDICINE | Facility: CLINIC | Age: 64
End: 2024-02-09
Payer: COMMERCIAL

## 2024-02-09 DIAGNOSIS — E11.9 TYPE 2 DIABETES MELLITUS WITHOUT COMPLICATION, UNSPECIFIED WHETHER LONG TERM INSULIN USE: ICD-10-CM

## 2024-02-09 RX ORDER — DULAGLUTIDE 1.5 MG/.5ML
1.5 INJECTION, SOLUTION SUBCUTANEOUS
Qty: 4 PEN | Refills: 5 | Status: SHIPPED | OUTPATIENT
Start: 2024-02-09 | End: 2024-03-01

## 2024-02-09 NOTE — TELEPHONE ENCOUNTER
----- Message from Analy Arriaza sent at 2/9/2024 10:56 AM CST -----  .Type:  RX Refill Request    Who Called: Lauren  Refill or New Rx:Refill  RX Name and Strength:dulaglutide (TRULICITY) 1.5 mg/0.5 mL pen injector  How is the patient currently taking it? (ex. 1XDay):1/week  Is this a 30 day or 90 day RX:30  Preferred Pharmacy with phone number:[facility] on [address]  Local or Mail Order:Local  Ordering Provider:Linda  Would the patient rather a call back or a response via [facility]?   Best Call Back Number:[phone]  Additional Information: dulaglutide (TRULICITY) 1.5 mg/0.5 mL pen injector    Patient stats she has been out a month and has called several times about this.     Please call back about this!

## 2024-02-21 ENCOUNTER — OFFICE VISIT (OUTPATIENT)
Dept: ORTHOPEDICS | Facility: CLINIC | Age: 64
End: 2024-02-21
Payer: OTHER MISCELLANEOUS

## 2024-02-21 ENCOUNTER — HOSPITAL ENCOUNTER (OUTPATIENT)
Dept: RADIOLOGY | Facility: CLINIC | Age: 64
Discharge: HOME | End: 2024-02-21
Attending: ORTHOPAEDIC SURGERY
Payer: COMMERCIAL

## 2024-02-21 VITALS
BODY MASS INDEX: 40.8 KG/M2 | SYSTOLIC BLOOD PRESSURE: 128 MMHG | HEIGHT: 64 IN | WEIGHT: 239 LBS | DIASTOLIC BLOOD PRESSURE: 66 MMHG | HEART RATE: 59 BPM

## 2024-02-21 DIAGNOSIS — S52.502E TYPE I OR II OPEN FRACTURE OF DISTAL END OF LEFT RADIUS WITH ROUTINE HEALING, UNSPECIFIED FRACTURE MORPHOLOGY, SUBSEQUENT ENCOUNTER: ICD-10-CM

## 2024-02-21 DIAGNOSIS — S52.502E TYPE I OR II OPEN FRACTURE OF DISTAL END OF LEFT RADIUS WITH ROUTINE HEALING, UNSPECIFIED FRACTURE MORPHOLOGY, SUBSEQUENT ENCOUNTER: Primary | ICD-10-CM

## 2024-02-21 PROCEDURE — 73110 X-RAY EXAM OF WRIST: CPT | Mod: LT,,, | Performed by: ORTHOPAEDIC SURGERY

## 2024-02-21 PROCEDURE — 99024 POSTOP FOLLOW-UP VISIT: CPT | Mod: ,,, | Performed by: ORTHOPAEDIC SURGERY

## 2024-02-21 NOTE — PROGRESS NOTES
Subjective:       Patient ID: Lauren Kaba is a 64 y.o. female.  Chief Complaint   Patient presents with    Post-op Evaluation     9 wks out, HWR LEFT WRIST, sx 12/20/23, doing good, doing therapy, been going for a month, has some pain in the afternoon but bearable, has no other complaints,         Follow-up        Patient presents for 9 week post op visit KAZ ORIF left radius fracture and ulna fracture. Doing well overall.  He is back to work.  She is using her compression stocking which she is in joint.  She has some problems pulling her credit card out of her purse but overall does really well.  Very happy.  ROS:  Constitutional: Denies fever chills  Eyes: No change in vision  ENT: No ringing or current infections  CV: No chest pain  Resp: No labored breathing  MSK: Pain evident at site of injury located in HPI,   Integ: No signs of abrasions or lacerations  Neuro: No numbness or tingling  Lymphatic: No swelling outside the area of injury     Current Outpatient Medications on File Prior to Visit   Medication Sig Dispense Refill    ACCU-CHEK GUIDE TEST STRIPS Strp USE TO TEST BLOOD SUGAR TWO TIMES A  strip 11    acetaminophen (TYLENOL) 500 MG tablet Take 1,000 mg by mouth every 6 (six) hours as needed for Pain.      apixaban (ELIQUIS) 5 mg Tab Take 5 mg by mouth 2 (two) times daily.      ascorbic acid, vitamin C, (VITAMIN C) 500 MG tablet Take 500 mg by mouth once daily.      Bifidobacterium infantis (ALIGN ORAL) Take 1 capsule by mouth once daily.      celecoxib (CELEBREX) 200 MG capsule TAKE ONE CAPSULE BY MOUTH EVERY DAY 30 capsule 5    diltiaZEM (CARDIZEM CD) 120 MG Cp24 Take 240 mg by mouth 2 (two) times a day.      dulaglutide (TRULICITY) 1.5 mg/0.5 mL pen injector Inject 1.5 mg into the skin every 7 days. 4 pen 5    fexofenadine (ALLEGRA) 180 MG tablet Take 180 mg by mouth every evening.      fluticasone propionate (FLONASE) 50 mcg/actuation nasal spray 2 sprays by Each Nostril route nightly  "as needed for Allergies or Rhinitis.      folic acid/multivit-min/lutein (CENTRUM SILVER ORAL) Take 1 tablet by mouth once daily.      glucosamine HCl (GLUCOSAMINE, BULK, MISC) Take by mouth 2 (two) times a day.      metFORMIN (GLUCOPHAGE-XR) 500 MG ER 24hr tablet TAKE ONE TABLET BY MOUTH TWICE A DAY 60 tablet 6    omega 3-dha-epa-fish oil 1,200 (144-216) mg Cap Take 1 capsule by mouth 2 (two) times a day.      rosuvastatin (CRESTOR) 20 MG tablet Take 20 mg by mouth every evening.      sotaloL (BETAPACE) 160 MG Tab Take 160 mg by mouth 2 (two) times daily.      vitamin D (VITAMIN D3) 1000 units Tab Take 1,000 Units by mouth once daily.       Current Facility-Administered Medications on File Prior to Visit   Medication Dose Route Frequency Provider Last Rate Last Admin    sodium chloride 0.9% flush 10 mL  10 mL Intravenous PRN Yolanda Santamaria PA-C              Objective:      /66 (BP Location: Right arm, Patient Position: Sitting, BP Method: Large (Automatic))   Pulse (!) 59   Ht 5' 4" (1.626 m)   Wt 108.4 kg (239 lb)   BMI 41.02 kg/m²   Physical Exam  General the patient is alert and oriented x3 no acute distress nontoxic-appearing appropriate affect.    Constitutional: Vital signs are examined and stable.  Resp: No signs of labored breathing    Musculoskeletal:     Left upper extremity:  Incisions well healed without erythema, drainage, signs of dehiscence,; compartments are soft and compressible; no wrist ROM attempted, FROM at the elbow, some stiffness at the digits but able to flex and extend; moderate tenderness to palpation; AIN/PIN/ulna motor intact; SILT distally; BCR distally; Radial pulse palpable full range of motion of the fingers      Body mass index is 41.02 kg/m².  Ideal body weight: 54.7 kg (120 lb 9.5 oz)  Adjusted ideal body weight: 76.2 kg (167 lb 15.3 oz)  Hemoglobin A1c   Date Value Ref Range Status   04/03/2023 6.2 <=7.0 % Final   10/12/2022 6.2 <=7.0 % Final     Hgb   Date " "Value Ref Range Status   12/20/2023 12.2 12.0 - 16.0 g/dL Final   08/25/2023 12.1 12.0 - 16.0 g/dL Final     Hct   Date Value Ref Range Status   12/20/2023 37.1 37.0 - 47.0 % Final   08/25/2023 35.4 (L) 37.0 - 47.0 % Final     No results found for: "IRON"  No components found for: "FROLATE"  No results found for: "[id number]"  WBC   Date Value Ref Range Status   12/20/2023 6.95 4.50 - 11.50 x10(3)/mcL Final   08/25/2023 9.35 4.50 - 11.50 x10(3)/mcL Final       Radiology: 3 view x ray of the left distal radius:  Distal radius plate intact.  Ulnar plate intact.  No DRUJ widening        Assessment:         1. Type I or II open fracture of distal end of left radius with routine healing, unspecified fracture morphology, subsequent encounter  X-Ray Wrist Complete Left              Plan:         No follow-ups on file.    Lauren was seen today for post-op evaluation.    Diagnoses and all orders for this visit:    Type I or II open fracture of distal end of left radius with routine healing, unspecified fracture morphology, subsequent encounter  -     X-Ray Wrist Complete Left; Future        Patient has been doing very well since hardware removal.  We did completed open reduction internal fixation of the distal radius.  She is working with physical therapy she has 30 more days of this.  She is back to work full duty.  She will follow up as needed.  Overall she is doing quite well.    This note/OR report was created with the assistance of  voice recognition software or phone  dictation.  There may be transcription errors as a result of using this technology however minimal. Effort has been made to assure accuracy of transcription but any obvious errors or omissions should be clarified with the author of the document.       Keith Goodwin, DO  Orthopedic Trauma Surgery       No future appointments.                "

## 2024-02-23 NOTE — TELEPHONE ENCOUNTER
Kathleen Gao Staff  Caller: Unspecified (Today,  8:37 AM)  .Type:  Needs Medical Advice    Who Called: pt  Symptoms (please be specific):    How long has patient had these symptoms:    Pharmacy name and phone #:  [facility]  Would the patient rather a call back or a response via [facility]? Call back  Best Call Back Number: [phone]  Additional Information: pt states [facility] is faxing prior auth today

## 2024-02-29 ENCOUNTER — TELEPHONE (OUTPATIENT)
Dept: INTERNAL MEDICINE | Facility: CLINIC | Age: 64
End: 2024-02-29
Payer: COMMERCIAL

## 2024-02-29 DIAGNOSIS — E11.9 TYPE 2 DIABETES MELLITUS WITHOUT COMPLICATION, UNSPECIFIED WHETHER LONG TERM INSULIN USE: Primary | ICD-10-CM

## 2024-02-29 NOTE — TELEPHONE ENCOUNTER
----- Message from Kathleen Gao sent at 2/29/2024 11:48 AM CST -----  .Type:  Needs Medical Advice    Who Called: pt  Symptoms (please be specific):    How long has patient had these symptoms:    Pharmacy name and phone #:  [id number] AT [place] [address] [address]  Would the patient rather a call back or a response via [facility]? Call back   Best Call Back Number: [phone]  Additional Information: pt needs a call back about her dulaglutide (TRULICITY) 1.5 mg/0.5 mL pen injector,

## 2024-03-01 RX ORDER — TIRZEPATIDE 5 MG/.5ML
5 INJECTION, SOLUTION SUBCUTANEOUS
Qty: 4 PEN | Refills: 0 | Status: SHIPPED | OUTPATIENT
Start: 2024-03-01 | End: 2024-04-08

## 2024-03-01 NOTE — TELEPHONE ENCOUNTER
Per MD, change to Mounjaro 5mg weekly x 1 month then titrate up. Pt notified and voiced understanding. Rx sent.

## 2024-03-05 ENCOUNTER — TELEPHONE (OUTPATIENT)
Dept: INTERNAL MEDICINE | Facility: CLINIC | Age: 64
End: 2024-03-05
Payer: COMMERCIAL

## 2024-03-05 DIAGNOSIS — E66.01 MORBID (SEVERE) OBESITY DUE TO EXCESS CALORIES: ICD-10-CM

## 2024-03-05 DIAGNOSIS — I48.92 ATRIAL FLUTTER, UNSPECIFIED TYPE: ICD-10-CM

## 2024-03-05 DIAGNOSIS — R53.83 FATIGUE, UNSPECIFIED TYPE: ICD-10-CM

## 2024-03-05 DIAGNOSIS — E11.9 TYPE 2 DIABETES MELLITUS WITHOUT COMPLICATION, UNSPECIFIED WHETHER LONG TERM INSULIN USE: ICD-10-CM

## 2024-03-05 DIAGNOSIS — I48.20 CHRONIC A-FIB: ICD-10-CM

## 2024-03-05 DIAGNOSIS — Z00.00 WELLNESS EXAMINATION: Primary | ICD-10-CM

## 2024-03-05 NOTE — TELEPHONE ENCOUNTER
PA for Mounjaro denied. Per MD ok to cont with just metformin and time for OV with labs, please transfer to the front to have schedule appt.

## 2024-03-05 NOTE — TELEPHONE ENCOUNTER
Pt notified of PA being denied and recommendations, voiced understanding. Transferred to ext [phone] to schedule appt

## 2024-03-05 NOTE — TELEPHONE ENCOUNTER
----- Message from Janette Rm sent at 3/5/2024  8:48 AM CST -----  .Type:  Needs Medical Advice    Who Called: PT  Symptoms (please be specific):    How long has patient had these symptoms:    Pharmacy name and phone #:    Would the patient rather a call back or a response via [facility]? cb  Best Call Back Number: [phone]  Additional Information: tirzepatide (MOUNJARO) 5 mg/0.5 mL JadaIj needs PA

## 2024-03-21 ENCOUNTER — PATIENT OUTREACH (OUTPATIENT)
Dept: ADMINISTRATIVE | Facility: HOSPITAL | Age: 64
End: 2024-03-21
Payer: COMMERCIAL

## 2024-03-21 DIAGNOSIS — Z12.39 ENCOUNTER FOR SCREENING FOR MALIGNANT NEOPLASM OF BREAST, UNSPECIFIED SCREENING MODALITY: ICD-10-CM

## 2024-03-21 DIAGNOSIS — Z78.0 POSTMENOPAUSAL STATUS (AGE-RELATED) (NATURAL): Primary | ICD-10-CM

## 2024-03-21 DIAGNOSIS — Z12.31 SCREENING MAMMOGRAM FOR BREAST CANCER: ICD-10-CM

## 2024-03-21 PROBLEM — E11.9 TYPE 2 DIABETES MELLITUS WITHOUT COMPLICATION: Status: ACTIVE | Noted: 2024-03-21

## 2024-03-21 PROBLEM — E11.9 TYPE 2 DIABETES MELLITUS WITHOUT COMPLICATION: Status: ACTIVE | Noted: 2018-10-01

## 2024-03-21 NOTE — Clinical Note
Yomi Mayo,   Can you all please send  referral to Dr. Franklin Kaba pt due for colorectal screening was due in 5 years, last screening 9/27/2016. Next visit 4/8/2024 Thanks for all that you all do!

## 2024-03-21 NOTE — PROGRESS NOTES
Population Health. Out Reach. Reviewing patient's chart for quality metrics.Records request sent to [facility]  office for dexa scan. Spoke to pt re:health maintenance. Pt reports she is scheduled for mmg and dexa scan on 3/28/24 at [facility]. Stressed importance of health maintenance. Pt voices understanding to instructions given and appreciation.     Health Maintenance Topic(s) Outreach Outcomes & Actions Taken:    Breast Cancer Screening - Outreach Outcomes & Actions Taken  : Mammogram Order Placed and patient report scheduled for mammogram and bone density on 3/28/24 at [facility], called [facility] to check on last bone density report, [facility] reports no bone density done there, but scheduled for 3/28/24 and has no orders on file, orders sent for mmg and dexa to [facility].    Osteoporosis Screening - Outreach Outcomes & Actions Taken  : Dexa Order Placed and tristan dexa scan to [facility]. Call and spoke to pt reports scheduled for bone density 3/28/24 called [facility] to check on last bone density report, [facility] reports no bone density done there, but scheduled for 3/28/24 and has no orders on file, orders sent for mmg and dexa to [facility].    Colorectal Cancer Screening - Outreach Outcomes & Actions Taken  : sent message to office staff to send referral to Dr. Franklin Kaba pt due for colorectal screening due was due in 5 years, last screening 9/27/2016    Lab(s) - Outreach Outcomes & Actions Taken  : pt due for lipid panel, A1c, dm urine and orders already placed and remind pt to get labs done prior to appt with pcp on 4/8/24.    Diabetic Foot Exam - Outreach Outcomes & Actions Taken  : discuss with pt dm foot exam due and to discuss with pcp at appt on 4/8/24 at 3:20 pm                         Additional Notes:           Patient denies sdoh barriers at this time.

## 2024-03-21 NOTE — LETTER
AUTHORIZATION FOR RELEASE OF   CONFIDENTIAL INFORMATION    Dear [facility],    We are seeing Lauren Kaba, date of birth 1960, in the clinic at [place] [address] [facility]. Roni Mckeon II, MD is the patient's PCP. Lauren Kaba has an outstanding lab/procedure at the time we reviewed her chart. In order to help keep her health information updated, she has authorized us to request the following medical record(s):        (  )  MAMMOGRAM                                      (  )  COLONOSCOPY      (  )  PAP SMEAR                                          (  )  OUTSIDE LAB RESULTS     ( * )  DEXA SCAN                                          (  )  EYE EXAM            (  )  FOOT EXAM                                          (  )  ENTIRE RECORD     (  )  OUTSIDE IMMUNIZATIONS                 (  )  _______________         Please fax records to [facility], Chastant, Bradley J II, MD, [phone].     If you have any questions, please contact Becca Arango, [facility] at [phone]..           Patient Name: Lauren Kaba  : 1960  Patient Phone #: [phone]

## 2024-03-25 DIAGNOSIS — Z12.11 SCREENING FOR COLON CANCER: Primary | ICD-10-CM

## 2024-03-28 LAB
BCS RECOMMENDATION EXT: NORMAL
BMD RECOMMENDATION EXT: NORMAL
BMD RECOMMENDATION EXT: NORMAL

## 2024-04-01 ENCOUNTER — TELEPHONE (OUTPATIENT)
Dept: INTERNAL MEDICINE | Facility: CLINIC | Age: 64
End: 2024-04-01
Payer: COMMERCIAL

## 2024-04-01 ENCOUNTER — LAB VISIT (OUTPATIENT)
Dept: LAB | Facility: HOSPITAL | Age: 64
End: 2024-04-01
Attending: INTERNAL MEDICINE
Payer: COMMERCIAL

## 2024-04-01 ENCOUNTER — DOCUMENTATION ONLY (OUTPATIENT)
Dept: INTERNAL MEDICINE | Facility: CLINIC | Age: 64
End: 2024-04-01
Payer: COMMERCIAL

## 2024-04-01 DIAGNOSIS — E66.01 MORBID (SEVERE) OBESITY DUE TO EXCESS CALORIES: ICD-10-CM

## 2024-04-01 DIAGNOSIS — I48.92 ATRIAL FLUTTER, UNSPECIFIED TYPE: ICD-10-CM

## 2024-04-01 DIAGNOSIS — E11.9 TYPE 2 DIABETES MELLITUS WITHOUT COMPLICATION, UNSPECIFIED WHETHER LONG TERM INSULIN USE: ICD-10-CM

## 2024-04-01 DIAGNOSIS — I48.20 CHRONIC A-FIB: ICD-10-CM

## 2024-04-01 DIAGNOSIS — Z00.00 WELLNESS EXAMINATION: ICD-10-CM

## 2024-04-01 DIAGNOSIS — R53.83 FATIGUE, UNSPECIFIED TYPE: ICD-10-CM

## 2024-04-01 LAB
ALBUMIN SERPL-MCNC: 4 G/DL (ref 3.4–4.8)
ALBUMIN/GLOB SERPL: 1.4 RATIO (ref 1.1–2)
ALP SERPL-CCNC: 92 UNIT/L (ref 40–150)
ALT SERPL-CCNC: 70 UNIT/L (ref 0–55)
AST SERPL-CCNC: 46 UNIT/L (ref 5–34)
BASOPHILS # BLD AUTO: 0.04 X10(3)/MCL
BASOPHILS NFR BLD AUTO: 0.7 %
BILIRUB SERPL-MCNC: 1 MG/DL
BUN SERPL-MCNC: 17.8 MG/DL (ref 9.8–20.1)
CALCIUM SERPL-MCNC: 9.2 MG/DL (ref 8.4–10.2)
CHLORIDE SERPL-SCNC: 105 MMOL/L (ref 98–107)
CHOLEST SERPL-MCNC: 156 MG/DL
CHOLEST/HDLC SERPL: 3 {RATIO} (ref 0–5)
CO2 SERPL-SCNC: 25 MMOL/L (ref 23–31)
CREAT SERPL-MCNC: 0.78 MG/DL (ref 0.55–1.02)
CREAT UR-MCNC: 184.6 MG/DL (ref 45–106)
EOSINOPHIL # BLD AUTO: 0.21 X10(3)/MCL (ref 0–0.9)
EOSINOPHIL NFR BLD AUTO: 3.5 %
ERYTHROCYTE [DISTWIDTH] IN BLOOD BY AUTOMATED COUNT: 13 % (ref 11.5–17)
EST. AVERAGE GLUCOSE BLD GHB EST-MCNC: 168.6 MG/DL
GFR SERPLBLD CREATININE-BSD FMLA CKD-EPI: >60 MLS/MIN/1.73/M2
GLOBULIN SER-MCNC: 2.9 GM/DL (ref 2.4–3.5)
GLUCOSE SERPL-MCNC: 229 MG/DL (ref 82–115)
HBA1C MFR BLD: 7.5 %
HCT VFR BLD AUTO: 40.4 % (ref 37–47)
HDLC SERPL-MCNC: 53 MG/DL (ref 35–60)
HGB BLD-MCNC: 13.5 G/DL (ref 12–16)
IMM GRANULOCYTES # BLD AUTO: 0.03 X10(3)/MCL (ref 0–0.04)
IMM GRANULOCYTES NFR BLD AUTO: 0.5 %
LDLC SERPL CALC-MCNC: 73 MG/DL (ref 50–140)
LYMPHOCYTES # BLD AUTO: 2.03 X10(3)/MCL (ref 0.6–4.6)
LYMPHOCYTES NFR BLD AUTO: 33.4 %
MCH RBC QN AUTO: 29.2 PG (ref 27–31)
MCHC RBC AUTO-ENTMCNC: 33.4 G/DL (ref 33–36)
MCV RBC AUTO: 87.4 FL (ref 80–94)
MICROALBUMIN UR-MCNC: 15.2 UG/ML
MICROALBUMIN/CREAT RATIO PNL UR: 8.2 MG/GM CR (ref 0–30)
MONOCYTES # BLD AUTO: 0.54 X10(3)/MCL (ref 0.1–1.3)
MONOCYTES NFR BLD AUTO: 8.9 %
NEUTROPHILS # BLD AUTO: 3.23 X10(3)/MCL (ref 2.1–9.2)
NEUTROPHILS NFR BLD AUTO: 53 %
NRBC BLD AUTO-RTO: 0 %
PLATELET # BLD AUTO: 182 X10(3)/MCL (ref 130–400)
PMV BLD AUTO: 9 FL (ref 7.4–10.4)
POTASSIUM SERPL-SCNC: 4.2 MMOL/L (ref 3.5–5.1)
PROT SERPL-MCNC: 6.9 GM/DL (ref 5.8–7.6)
RBC # BLD AUTO: 4.62 X10(6)/MCL (ref 4.2–5.4)
SODIUM SERPL-SCNC: 141 MMOL/L (ref 136–145)
TRIGL SERPL-MCNC: 149 MG/DL (ref 37–140)
TSH SERPL-ACNC: 1.35 UIU/ML (ref 0.35–4.94)
VLDLC SERPL CALC-MCNC: 30 MG/DL
WBC # SPEC AUTO: 6.08 X10(3)/MCL (ref 4.5–11.5)

## 2024-04-01 PROCEDURE — 84443 ASSAY THYROID STIM HORMONE: CPT

## 2024-04-01 PROCEDURE — 36415 COLL VENOUS BLD VENIPUNCTURE: CPT

## 2024-04-01 PROCEDURE — 83036 HEMOGLOBIN GLYCOSYLATED A1C: CPT

## 2024-04-01 PROCEDURE — 85025 COMPLETE CBC W/AUTO DIFF WBC: CPT

## 2024-04-01 PROCEDURE — 80053 COMPREHEN METABOLIC PANEL: CPT

## 2024-04-01 PROCEDURE — 82043 UR ALBUMIN QUANTITATIVE: CPT

## 2024-04-01 PROCEDURE — 80061 LIPID PANEL: CPT

## 2024-04-01 NOTE — TELEPHONE ENCOUNTER
----- Message from Gomez Rudolph LPN sent at 3/27/2024 10:04 AM CDT -----  Regarding: jonnathan shen 4/8 @3:20  Are there any outstanding tasks in patient chart? Needs fasting labs    Is there documentation of outstanding tasks in patient chart? no    Has patient been to the ER, urgent care, or another physician since last visit?    Has patient done any blood work or x-rays since last visit?    5. PLEASE HAVE PATIENT BRING MEDICATION LIST OR BOTTLES TO EVERY OFFICE VISIT

## 2024-04-08 ENCOUNTER — OFFICE VISIT (OUTPATIENT)
Dept: INTERNAL MEDICINE | Facility: CLINIC | Age: 64
End: 2024-04-08
Payer: COMMERCIAL

## 2024-04-08 VITALS
HEIGHT: 64 IN | WEIGHT: 246 LBS | HEART RATE: 72 BPM | DIASTOLIC BLOOD PRESSURE: 72 MMHG | BODY MASS INDEX: 42 KG/M2 | OXYGEN SATURATION: 98 % | SYSTOLIC BLOOD PRESSURE: 110 MMHG

## 2024-04-08 DIAGNOSIS — E11.9 TYPE 2 DIABETES MELLITUS WITHOUT COMPLICATION, WITHOUT LONG-TERM CURRENT USE OF INSULIN: ICD-10-CM

## 2024-04-08 DIAGNOSIS — I48.20 CHRONIC A-FIB: Primary | ICD-10-CM

## 2024-04-08 PROCEDURE — 3078F DIAST BP <80 MM HG: CPT | Mod: CPTII,,, | Performed by: INTERNAL MEDICINE

## 2024-04-08 PROCEDURE — 3008F BODY MASS INDEX DOCD: CPT | Mod: CPTII,,, | Performed by: INTERNAL MEDICINE

## 2024-04-08 PROCEDURE — 99214 OFFICE O/P EST MOD 30 MIN: CPT | Mod: ,,, | Performed by: INTERNAL MEDICINE

## 2024-04-08 PROCEDURE — 1159F MED LIST DOCD IN RCRD: CPT | Mod: CPTII,,, | Performed by: INTERNAL MEDICINE

## 2024-04-08 PROCEDURE — 3066F NEPHROPATHY DOC TX: CPT | Mod: CPTII,,, | Performed by: INTERNAL MEDICINE

## 2024-04-08 PROCEDURE — 3288F FALL RISK ASSESSMENT DOCD: CPT | Mod: CPTII,,, | Performed by: INTERNAL MEDICINE

## 2024-04-08 PROCEDURE — 3074F SYST BP LT 130 MM HG: CPT | Mod: CPTII,,, | Performed by: INTERNAL MEDICINE

## 2024-04-08 PROCEDURE — 1101F PT FALLS ASSESS-DOCD LE1/YR: CPT | Mod: CPTII,,, | Performed by: INTERNAL MEDICINE

## 2024-04-08 PROCEDURE — 3051F HG A1C>EQUAL 7.0%<8.0%: CPT | Mod: CPTII,,, | Performed by: INTERNAL MEDICINE

## 2024-04-08 PROCEDURE — 3061F NEG MICROALBUMINURIA REV: CPT | Mod: CPTII,,, | Performed by: INTERNAL MEDICINE

## 2024-04-08 RX ORDER — TIRZEPATIDE 2.5 MG/.5ML
2.5 INJECTION, SOLUTION SUBCUTANEOUS
Qty: 4 PEN | Refills: 0 | Status: SHIPPED | OUTPATIENT
Start: 2024-04-08 | End: 2024-04-29 | Stop reason: SDUPTHER

## 2024-04-08 NOTE — PROGRESS NOTES
Patient ID: Lauren Kaba is a 64 y.o. female.    Chief Complaint: Annual Exam (Labs 4/1)      HPI:   Patient presents here today for above reason.     Lauren is a 64-year-old female presenting today in follow-up.  History AFib flutters underwent another ablation.  She is anticoagulated rate controlled.  Otherwise A1c did go up to 7.5 she was on Trulicity but was having some issues with the insurance.  Otherwise doing well age-appropriate screening up-to-date here for follow-up    The patient's Health Maintenance was reviewed and the following appears to be due at this time:   Health Maintenance Due   Topic Date Due    Hepatitis C Screening  Never done    Pneumococcal Vaccines (Age 65+) (1 of 2 - PCV) Never done    Foot Exam  Never done    RSV Vaccine (Age 60+ and Pregnant patients) (1 - 1-dose 60+ series) Never done    DEXA Scan  10/18/2020    Colorectal Cancer Screening  09/27/2021    COVID-19 Vaccine (7 - 2023-24 season) 09/01/2023        Past Medical History:  Past Medical History:   Diagnosis Date    Arthritis     Atrial fibrillation     Diabetes mellitus     Fatigue     History of kidney stones     Obesity     Personal history of colonic polyps 09/27/2016    Sleep apnea     uses CPAP    Type I or II open fracture of distal end of left radius with routine healing, unspecified fracture morphology, subsequent encounter      Past Surgical History:   Procedure Laterality Date    BREAST SURGERY  11/2013    Reduction    CARDIAC ELECTROPHYSIOLOGY STUDY AND ABLATION      CARDIOVERSION      x 3    COLONOSCOPY W/ POLYPECTOMY  09/27/2016    LITHOTRIPSY, WITH CYSTOSCOPY  2018    OPEN REDUCTION AND INTERNAL FIXATION (ORIF) OF FRACTURE OF RADIUS Left 08/24/2023    Procedure: ORIF, FRACTURE, RADIUS;  Surgeon: Keith Goodwin DO;  Location: [facility];  Service: Orthopedics;  Laterality: Left;  Distal Radius. no block, supine hand table c arm skeletal dyamics, wash stuff    OPEN REDUCTION AND INTERNAL FIXATION (ORIF) OF  INJURY OF WRIST Left 12/20/2023    Procedure: ORIF, WRIST;  Surgeon: Keith Goodwin DO;  Location: [facility] OR;  Service: Orthopedics;  Laterality: Left;    REMOVAL,ORTHOPEDIC HARDWARE,UPPER EXTREMITY Left 12/20/2023    Procedure: REMOVAL,ORTHOPEDIC HARDWARE,UPPER EXTREMITY;  Surgeon: Keith Goodwin DO;  Location: [facility] OR;  Service: Orthopedics;  Laterality: Left;  Left wrist hardware removal, supine. Vascular, hand table, C-arm    REVISION OF KNEE ARTHROPLASTY Right     TONSILLECTOMY  1989    TOTAL KNEE ARTHROPLASTY Right 05/2013     Review of patient's allergies indicates:   Allergen Reactions    Cipro [ciprofloxacin hcl] Hives     Current Outpatient Medications on File Prior to Visit   Medication Sig Dispense Refill    acetaminophen (TYLENOL) 500 MG tablet Take 1,000 mg by mouth every 6 (six) hours as needed for Pain.      apixaban (ELIQUIS) 5 mg Tab Take 5 mg by mouth 2 (two) times daily.      ascorbic acid, vitamin C, (VITAMIN C) 500 MG tablet Take 500 mg by mouth once daily.      Bifidobacterium infantis (ALIGN ORAL) Take 1 capsule by mouth once daily.      celecoxib (CELEBREX) 200 MG capsule TAKE ONE CAPSULE BY MOUTH EVERY DAY 30 capsule 5    diltiaZEM (CARDIZEM CD) 120 MG Cp24 Take 240 mg by mouth 2 (two) times a day.      fexofenadine (ALLEGRA) 180 MG tablet Take 180 mg by mouth every evening.      fluticasone propionate (FLONASE) 50 mcg/actuation nasal spray 2 sprays by Each Nostril route nightly as needed for Allergies or Rhinitis.      folic acid/multivit-min/lutein (CENTRUM SILVER ORAL) Take 1 tablet by mouth once daily.      glucosamine HCl (GLUCOSAMINE, BULK, MISC) Take by mouth 2 (two) times a day.      metFORMIN (GLUCOPHAGE-XR) 500 MG ER 24hr tablet TAKE ONE TABLET BY MOUTH TWICE A DAY 60 tablet 6    omega 3-dha-epa-fish oil 1,200 (144-216) mg Cap Take 1 capsule by mouth 2 (two) times a day.      rosuvastatin (CRESTOR) 20 MG tablet Take 20 mg by mouth every evening.      sotaloL (BETAPACE) 160 MG Tab  Take 160 mg by mouth 2 (two) times daily.      vitamin D (VITAMIN D3) 1000 units Tab Take 1,000 Units by mouth once daily.      ACCU-CHEK GUIDE TEST STRIPS Strp USE TO TEST BLOOD SUGAR TWO TIMES A  strip 11    tirzepatide (MOUNJARO) 5 mg/0.5 mL PnIj Inject 5 mg into the skin every 7 days. (Patient not taking: Reported on 4/8/2024) 4 Pen 0     Current Facility-Administered Medications on File Prior to Visit   Medication Dose Route Frequency Provider Last Rate Last Admin    sodium chloride 0.9% flush 10 mL  10 mL Intravenous PRN Yolanda Santamaria PA-C         Social History     Socioeconomic History    Marital status:    Tobacco Use    Smoking status: Never    Smokeless tobacco: Never   Substance and Sexual Activity    Alcohol use: Never    Drug use: Never    Sexual activity: Not Currently     Social Determinants of Health     Financial Resource Strain: Low Risk  (8/25/2023)    Overall Financial Resource Strain (CARDIA)     Difficulty of Paying Living Expenses: Not hard at all   Food Insecurity: No Food Insecurity (8/25/2023)    Hunger Vital Sign     Worried About Running Out of Food in the Last Year: Never true     Ran Out of Food in the Last Year: Never true   Transportation Needs: No Transportation Needs (8/25/2023)    PRAPARE - Transportation     Lack of Transportation (Medical): No     Lack of Transportation (Non-Medical): No   Social Connections: Unknown (8/25/2023)    Social Connection and Isolation Panel [NHANES]     Frequency of Communication with Friends and Family: More than three times a week     Frequency of Social Gatherings with Friends and Family: Twice a week     Marital Status:    Housing Stability: Unknown (8/25/2023)    Housing Stability Vital Sign     Unable to Pay for Housing in the Last Year: No     Family History   Problem Relation Age of Onset    Cancer Mother     Diabetes Mother     Arthritis Father     Heart disease Father     PONV Sister        ROS:   Comprehensive  "review of systems was performed and is negative except as noted above    Vitals/PE:   /72 (BP Location: Left arm, Patient Position: Sitting)   Pulse 72   Ht 5' 4" (1.626 m)   Wt 111.6 kg (246 lb)   SpO2 98%   BMI 42.23 kg/m²   Physical Exam    General: Alert and oriented, No acute distress.   Eye: Normal conjunctiva without exudate.  HENMT: Normocephalic/AT, Normal hearing, Oral mucosa is moist and pink   Neck: No goiter visualized.   Respiratory: Lungs CTAB, Respirations are non-labored, Breath sounds are equal, Symmetrical chest wall expansion.  Cardiovascular: Normal rate, Regular rhythm, No murmur, No edema.   Gastrointestinal: Non-distended.   Genitourinary: Deferred.  Musculoskeletal: Normal ROM, Normal gait, No deformities or amputations.  Integumentary: Warm, Dry, Intact. No diaphoresis, or flushing.  Neurologic: No focal deficits, Cranial Nerves II-XII are grossly intact.   Psychiatric: Cooperative, Appropriate mood & affect, Normal judgment, Non-suicidal.    Assessment/Plan:       1. Chronic a-fib    2. Type 2 diabetes mellitus without complication, without long-term current use of insulin         Plan:  Mounjaro start  (was on trulicity)  Cont metformin  S/p ablation, now rate controlled on oac.  Rtc 6mo  Education and counseling done face to face regarding medical conditions and plan. Contact office if new symptoms develop. Should any symptoms ever significantly worsen seek emergency medical attention/go to ER. Follow up at least yearly for wellness or sooner PRN. Nurse to call patient with any results. The patient is receptive, expresses understanding and is agreeable to plan. All questions have been answered.    No follow-ups on file.        "

## 2024-04-22 NOTE — PROGRESS NOTES
Population Health. Out Reach. Reviewing patient's chart for quality metrics.  I called [facility] to ck on dexa scan that was scheduled for 3/28/24 with mmg,  3/28/24 mmg in chart, no dexa scan, [facility] staff reports will send over dexa scan from 3/28/24.    I contact pt for pt outreach to f/u on colorectal cancer screening, pt reports that she seen Dr. Franklin Kaba [facility] is waiting on Dr. Sukhdev Price (Cardiology) to see when she needs to stop her blood thinner. Pt ok with a f/u call a few weeks.

## 2024-04-23 NOTE — PROGRESS NOTES
Records Received, hyper-linked into chart at this time. The following record(s)  below were uploaded for Health Maintenance .             3/28/2024 DEXA SCREENING

## 2024-04-29 ENCOUNTER — TELEPHONE (OUTPATIENT)
Dept: INTERNAL MEDICINE | Facility: CLINIC | Age: 64
End: 2024-04-29
Payer: COMMERCIAL

## 2024-04-29 DIAGNOSIS — E11.9 TYPE 2 DIABETES MELLITUS WITHOUT COMPLICATION, WITHOUT LONG-TERM CURRENT USE OF INSULIN: Primary | ICD-10-CM

## 2024-04-29 RX ORDER — TIRZEPATIDE 2.5 MG/.5ML
2.5 INJECTION, SOLUTION SUBCUTANEOUS
Qty: 4 PEN | Refills: 0 | Status: SHIPPED | OUTPATIENT
Start: 2024-04-29 | End: 2024-06-03

## 2024-04-29 NOTE — TELEPHONE ENCOUNTER
Bone Density Results Per Dr. Mckeon:  Normal Back  R Hip--Osteopenia  L Hip--Osteoporosis     Recommend: Vit D/ Ca, Prolia Injections and Repeat in 2 years.    Spoke to patient advised of results/recommendations. Patient verbalized understanding

## 2024-05-20 ENCOUNTER — HOSPITAL ENCOUNTER (EMERGENCY)
Facility: HOSPITAL | Age: 64
Discharge: HOME | End: 2024-05-20
Attending: STUDENT IN AN ORGANIZED HEALTH CARE EDUCATION/TRAINING PROGRAM
Payer: COMMERCIAL

## 2024-05-20 VITALS
BODY MASS INDEX: 40.8 KG/M2 | TEMPERATURE: 98 F | RESPIRATION RATE: 18 BRPM | HEIGHT: 64 IN | DIASTOLIC BLOOD PRESSURE: 82 MMHG | SYSTOLIC BLOOD PRESSURE: 135 MMHG | HEART RATE: 89 BPM | OXYGEN SATURATION: 97 % | WEIGHT: 239 LBS

## 2024-05-20 DIAGNOSIS — K80.20 GALLSTONES: ICD-10-CM

## 2024-05-20 DIAGNOSIS — R10.9 RIGHT FLANK PAIN: Primary | ICD-10-CM

## 2024-05-20 DIAGNOSIS — N30.00 ACUTE CYSTITIS WITHOUT HEMATURIA: ICD-10-CM

## 2024-05-20 DIAGNOSIS — N28.1 RENAL CYST: ICD-10-CM

## 2024-05-20 LAB
ALBUMIN SERPL-MCNC: 4.1 G/DL (ref 3.4–4.8)
ALBUMIN/GLOB SERPL: 1.3 RATIO (ref 1.1–2)
ALP SERPL-CCNC: 87 UNIT/L (ref 40–150)
ALT SERPL-CCNC: 81 UNIT/L (ref 0–55)
ANION GAP SERPL CALC-SCNC: 10 MEQ/L
AST SERPL-CCNC: 63 UNIT/L (ref 5–34)
BACTERIA #/AREA URNS AUTO: ABNORMAL /HPF
BASOPHILS # BLD AUTO: 0.03 X10(3)/MCL
BASOPHILS NFR BLD AUTO: 0.3 %
BILIRUB SERPL-MCNC: 1.5 MG/DL
BILIRUB UR QL STRIP.AUTO: NEGATIVE
BUN SERPL-MCNC: 19.4 MG/DL (ref 9.8–20.1)
CALCIUM SERPL-MCNC: 9 MG/DL (ref 8.4–10.2)
CHLORIDE SERPL-SCNC: 108 MMOL/L (ref 98–107)
CLARITY UR: CLEAR
CO2 SERPL-SCNC: 25 MMOL/L (ref 23–31)
COLOR UR AUTO: YELLOW
CREAT SERPL-MCNC: 0.59 MG/DL (ref 0.55–1.02)
CREAT/UREA NIT SERPL: 33
EOSINOPHIL # BLD AUTO: 0.05 X10(3)/MCL (ref 0–0.9)
EOSINOPHIL NFR BLD AUTO: 0.6 %
ERYTHROCYTE [DISTWIDTH] IN BLOOD BY AUTOMATED COUNT: 13.5 % (ref 11.5–17)
GFR SERPLBLD CREATININE-BSD FMLA CKD-EPI: >60 ML/MIN/1.73/M2
GLOBULIN SER-MCNC: 3.1 GM/DL (ref 2.4–3.5)
GLUCOSE SERPL-MCNC: 140 MG/DL (ref 82–115)
GLUCOSE UR QL STRIP: NORMAL
HCT VFR BLD AUTO: 42.5 % (ref 37–47)
HGB BLD-MCNC: 13.9 G/DL (ref 12–16)
HGB UR QL STRIP: NEGATIVE
IMM GRANULOCYTES # BLD AUTO: 0.03 X10(3)/MCL (ref 0–0.04)
IMM GRANULOCYTES NFR BLD AUTO: 0.3 %
KETONES UR QL STRIP: ABNORMAL
LEUKOCYTE ESTERASE UR QL STRIP: 25
LIPASE SERPL-CCNC: 25 U/L
LYMPHOCYTES # BLD AUTO: 1.07 X10(3)/MCL (ref 0.6–4.6)
LYMPHOCYTES NFR BLD AUTO: 12.4 %
MCH RBC QN AUTO: 28.9 PG (ref 27–31)
MCHC RBC AUTO-ENTMCNC: 32.7 G/DL (ref 33–36)
MCV RBC AUTO: 88.4 FL (ref 80–94)
MONOCYTES # BLD AUTO: 0.44 X10(3)/MCL (ref 0.1–1.3)
MONOCYTES NFR BLD AUTO: 5.1 %
MUCOUS THREADS URNS QL MICRO: ABNORMAL /LPF
NEUTROPHILS # BLD AUTO: 7.04 X10(3)/MCL (ref 2.1–9.2)
NEUTROPHILS NFR BLD AUTO: 81.3 %
NITRITE UR QL STRIP: ABNORMAL
NRBC BLD AUTO-RTO: 0 %
PH UR STRIP: 5.5 [PH]
PLATELET # BLD AUTO: 136 X10(3)/MCL (ref 130–400)
PMV BLD AUTO: 9.2 FL (ref 7.4–10.4)
POTASSIUM SERPL-SCNC: 4 MMOL/L (ref 3.5–5.1)
PROT SERPL-MCNC: 7.2 GM/DL (ref 5.8–7.6)
PROT UR QL STRIP: ABNORMAL
RBC # BLD AUTO: 4.81 X10(6)/MCL (ref 4.2–5.4)
RBC #/AREA URNS AUTO: ABNORMAL /HPF
SODIUM SERPL-SCNC: 143 MMOL/L (ref 136–145)
SP GR UR STRIP.AUTO: 1.03 (ref 1–1.03)
SQUAMOUS #/AREA URNS LPF: ABNORMAL /HPF
UROBILINOGEN UR STRIP-ACNC: NORMAL
WBC # SPEC AUTO: 8.66 X10(3)/MCL (ref 4.5–11.5)
WBC #/AREA URNS AUTO: ABNORMAL /HPF

## 2024-05-20 PROCEDURE — 96375 TX/PRO/DX INJ NEW DRUG ADDON: CPT

## 2024-05-20 PROCEDURE — 81001 URINALYSIS AUTO W/SCOPE: CPT | Performed by: NURSE PRACTITIONER

## 2024-05-20 PROCEDURE — 25500020 PHARM REV CODE 255

## 2024-05-20 PROCEDURE — 83690 ASSAY OF LIPASE: CPT | Performed by: NURSE PRACTITIONER

## 2024-05-20 PROCEDURE — 99285 EMERGENCY DEPT VISIT HI MDM: CPT | Mod: 25

## 2024-05-20 PROCEDURE — 63600175 PHARM REV CODE 636 W HCPCS

## 2024-05-20 PROCEDURE — 25000003 PHARM REV CODE 250

## 2024-05-20 PROCEDURE — 96374 THER/PROPH/DIAG INJ IV PUSH: CPT | Mod: 59

## 2024-05-20 PROCEDURE — 80053 COMPREHEN METABOLIC PANEL: CPT | Performed by: NURSE PRACTITIONER

## 2024-05-20 PROCEDURE — 85025 COMPLETE CBC W/AUTO DIFF WBC: CPT | Performed by: NURSE PRACTITIONER

## 2024-05-20 RX ORDER — CEFTRIAXONE 1 G/1
1 INJECTION, POWDER, FOR SOLUTION INTRAMUSCULAR; INTRAVENOUS
Status: COMPLETED | OUTPATIENT
Start: 2024-05-20 | End: 2024-05-20

## 2024-05-20 RX ORDER — ONDANSETRON HYDROCHLORIDE 2 MG/ML
4 INJECTION, SOLUTION INTRAVENOUS
Status: COMPLETED | OUTPATIENT
Start: 2024-05-20 | End: 2024-05-20

## 2024-05-20 RX ORDER — CEFDINIR 300 MG/1
300 CAPSULE ORAL EVERY 12 HOURS
Qty: 14 CAPSULE | Refills: 0 | Status: SHIPPED | OUTPATIENT
Start: 2024-05-20 | End: 2024-05-27

## 2024-05-20 RX ORDER — SODIUM CHLORIDE 9 MG/ML
1000 INJECTION, SOLUTION INTRAVENOUS
Status: COMPLETED | OUTPATIENT
Start: 2024-05-20 | End: 2024-05-20

## 2024-05-20 RX ORDER — ONDANSETRON 4 MG/1
4 TABLET, FILM COATED ORAL EVERY 6 HOURS PRN
Qty: 12 TABLET | Refills: 0 | Status: SHIPPED | OUTPATIENT
Start: 2024-05-20

## 2024-05-20 RX ADMIN — CEFTRIAXONE SODIUM 1 G: 1 INJECTION, POWDER, FOR SOLUTION INTRAMUSCULAR; INTRAVENOUS at 01:05

## 2024-05-20 RX ADMIN — ONDANSETRON 4 MG: 2 INJECTION INTRAMUSCULAR; INTRAVENOUS at 01:05

## 2024-05-20 RX ADMIN — IOHEXOL 100 ML: 350 INJECTION, SOLUTION INTRAVENOUS at 01:05

## 2024-05-20 RX ADMIN — SODIUM CHLORIDE 1000 ML: 9 INJECTION, SOLUTION INTRAVENOUS at 01:05

## 2024-05-20 NOTE — FIRST PROVIDER EVALUATION
Medical screening examination initiated.  I have conducted a focused provider triage encounter, findings are as follows:    Brief history of present illness:  Patient states right sided flank pain.     There were no vitals filed for this visit.    Pertinent physical exam:  Awake, alert, ambulatory      Brief workup plan:  Labs    Preliminary workup initiated; this workup will be continued and followed by the physician or advanced practice provider that is assigned to the patient when roomed.

## 2024-05-20 NOTE — ED PROVIDER NOTES
Encounter Date: 5/20/2024       History     Chief Complaint   Patient presents with    Flank Pain     Patient reports right sided flank pain and an episode of vomiting that started this morning. Denies fever, dysuria, burning with urination. Hx of kidney stones.      64 y.o. White female with a history of DM, kidney stones, and OA presents to Emergency Department with a chief complaint of R flank pain. Symptoms began today and have been waxing and waning since onset. Associated symptoms include vomiting. Symptoms are aggravated with palpation and there are no alleviating factors. The patient denies CP, SOB, wheezing, fever, chills, dizziness, or diarrhea. Reports she took a Norco 5 prior to arrival with moderate relief of symptoms. No other reported symptoms at this time.    The history is provided by the patient. No  was used.   Flank Pain  This is a new problem. The current episode started 3 to 5 hours ago. The problem occurs constantly. The problem has been gradually improving. Pertinent negatives include no chest pain, no abdominal pain, no headaches and no shortness of breath. The treatment provided moderate relief.     Review of patient's allergies indicates:   Allergen Reactions    Cipro [ciprofloxacin hcl] Hives     Past Medical History:   Diagnosis Date    Arthritis     Atrial fibrillation     Diabetes mellitus     Fatigue     History of kidney stones     Obesity     Personal history of colonic polyps 09/27/2016    Sleep apnea     uses CPAP    Type I or II open fracture of distal end of left radius with routine healing, unspecified fracture morphology, subsequent encounter      Past Surgical History:   Procedure Laterality Date    BREAST SURGERY  11/2013    Reduction    CARDIAC ELECTROPHYSIOLOGY STUDY AND ABLATION      CARDIOVERSION      x 3    COLONOSCOPY W/ POLYPECTOMY  09/27/2016    LITHOTRIPSY, WITH CYSTOSCOPY  2018    OPEN REDUCTION AND INTERNAL FIXATION (ORIF) OF FRACTURE OF RADIUS  Left 08/24/2023    Procedure: ORIF, FRACTURE, RADIUS;  Surgeon: Keith Goodwin DO;  Location: [facility] OR;  Service: Orthopedics;  Laterality: Left;  Distal Radius. no block, supine hand table c arm skeletal dyamics, wash stuff    OPEN REDUCTION AND INTERNAL FIXATION (ORIF) OF INJURY OF WRIST Left 12/20/2023    Procedure: ORIF, WRIST;  Surgeon: Keith Goodwin DO;  Location: [facility] OR;  Service: Orthopedics;  Laterality: Left;    REMOVAL,ORTHOPEDIC HARDWARE,UPPER EXTREMITY Left 12/20/2023    Procedure: REMOVAL,ORTHOPEDIC HARDWARE,UPPER EXTREMITY;  Surgeon: Keith Goodwin DO;  Location: [facility] OR;  Service: Orthopedics;  Laterality: Left;  Left wrist hardware removal, supine. Vascular, hand table, C-arm    REVISION OF KNEE ARTHROPLASTY Right     TONSILLECTOMY  1989    TOTAL KNEE ARTHROPLASTY Right 05/2013     Family History   Problem Relation Name Age of Onset    Cancer Mother Mother, father, and sister     Diabetes Mother Mother, father, and sister     Arthritis Father Both parents     Heart disease Father Both parents     PONV Sister       Social History     Tobacco Use    Smoking status: Never    Smokeless tobacco: Never   Substance Use Topics    Alcohol use: Never    Drug use: Never     Review of Systems   Constitutional:  Negative for chills, fatigue and fever.   Eyes:  Negative for photophobia and visual disturbance.   Respiratory:  Negative for cough, shortness of breath and stridor.    Cardiovascular:  Negative for chest pain and palpitations.   Gastrointestinal:  Positive for vomiting. Negative for abdominal pain and diarrhea.   Genitourinary:  Positive for flank pain. Negative for dysuria.   Neurological:  Negative for dizziness, syncope, weakness and headaches.   All other systems reviewed and are negative.      Physical Exam     Initial Vitals [05/20/24 1041]   BP Pulse Resp Temp SpO2   135/82 89 18 97.7 °F (36.5 °C) 97 %      MAP       --         Physical Exam    Nursing note and vitals reviewed.  Constitutional:  She appears well-developed and well-nourished. She is not diaphoretic. She is cooperative.  Non-toxic appearance. No distress.   HENT:   Head: Normocephalic and atraumatic.   Right Ear: External ear normal.   Left Ear: External ear normal.   Nose: Nose normal.   Eyes: Conjunctivae and EOM are normal. Pupils are equal, round, and reactive to light.   Neck: Neck supple.   Normal range of motion.  Cardiovascular:  Normal rate, regular rhythm, S1 normal, S2 normal, normal heart sounds, intact distal pulses and normal pulses.           Pulmonary/Chest: Effort normal and breath sounds normal. No tachypnea and no bradypnea. No respiratory distress. She has no decreased breath sounds. She has no wheezes. She has no rhonchi. She has no rales. She exhibits no tenderness.   Abdominal: Abdomen is soft. Bowel sounds are normal. She exhibits no distension. There is no abdominal tenderness. There is no rebound.   Musculoskeletal:         General: Tenderness present. Normal range of motion.      Cervical back: Normal range of motion and neck supple.        Back:      Neurological: She is alert and oriented to person, place, and time. She has normal strength. No sensory deficit. GCS score is 15. GCS eye subscore is 4. GCS verbal subscore is 5. GCS motor subscore is 6.   Skin: Skin is warm and dry. Capillary refill takes less than 2 seconds.   Psychiatric: She has a normal mood and affect. Thought content normal.         ED Course   Procedures  Labs Reviewed   COMPREHENSIVE METABOLIC PANEL - Abnormal; Notable for the following components:       Result Value    Chloride 108 (*)     Glucose 140 (*)     ALT 81 (*)     AST 63 (*)     All other components within normal limits   URINALYSIS, REFLEX TO URINE CULTURE - Abnormal; Notable for the following components:    Protein, UA Trace (*)     Ketones, UA 1+ (*)     Nitrites, UA 1+ (*)     Leukocyte Esterase, UA 25 (*)     WBC, UA 6-10 (*)     Bacteria, UA Many (*)     Squamous Epithelial  Cells, UA Occasional (*)     Mucous, UA Moderate (*)     All other components within normal limits   CBC WITH DIFFERENTIAL - Abnormal; Notable for the following components:    MCHC 32.7 (*)     All other components within normal limits   LIPASE - Normal   CBC W/ AUTO DIFFERENTIAL    Narrative:     The following orders were created for panel order CBC Auto Differential.  Procedure                               Abnormality         Status                     ---------                               -----------         ------                     CBC with Differential[[id number]]       Abnormal            Final result                 Please view results for these tests on the individual orders.          Imaging Results              CT Abdomen Pelvis With IV Contrast NO Oral Contrast (Final result)  Result time 05/20/24 13:58:56      Final result by Vesna Garrison MD (05/20/24 13:58:56)                   Impression:      Bilateral renal cysts which appear to be simple cysts    Cholelithiasis with no convincing evidence cholecystitis      Electronically signed by: Lazaro Garrison  Date:    05/20/2024  Time:    13:58               Narrative:    EXAMINATION:  CT ABDOMEN PELVIS WITH IV CONTRAST    CLINICAL HISTORY:  UTI, recurrent/complicated (Female);    TECHNIQUE:  Low dose axial images, sagittal and coronal reformations were obtained from the lung bases to the pubic symphysis following the IV administration of contrast. Automatic exposure control (AEC) is utilized to reduce patient radiation exposure.    COMPARISON:  None.    FINDINGS:  There is bibasilar atelectasis.    The liver appears normal.  No liver mass or lesion is seen.  Portal and hepatic veins appear normal.    Multiple gallstones seen in the gallbladder.  No pericholecystic fluid is seen.  No gallbladder wall thickening seen.    The pancreas appears normal.  No pancreatic mass or lesion is seen.    The spleen shows no acute abnormality.    The adrenal  glands appear normal.  No adrenal nodule is seen.    There is some cysts seen in both kidneys..  No hydronephrosis is seen.  No hydroureter is seen.  No nephrolithiasis is seen.  No obvious ureteral stones are seen.    Urinary bladder appears grossly unremarkable.    No colitis is seen.  No diverticulitis is seen.  No obvious colonic mass or lesion is seen.    No free air is seen.  No free fluid is seen.                                       Medications   cefTRIAXone injection 1 g (1 g Intravenous Given 5/20/24 1316)   0.9%  NaCl infusion (1,000 mLs Intravenous New Bag 5/20/24 1314)   ondansetron injection 4 mg (4 mg Intravenous Given 5/20/24 1313)   iohexoL (OMNIPAQUE 350) injection 100 mL (100 mLs Intravenous Given 5/20/24 1346)     Medical Decision Making  Patient awake, alert, has non-labored breathing, and follows commands appropriately. Arrived to ED due to R flank pain and vomiting that began today. Denies injury/trauma. Patient concerned about kidney stones due to hx. NAD Noted. Afebrile.         Differential Diagnosis: Kidney Stone, Flank Pain, UTI, Pyelonephritis     Amount and/or Complexity of Data Reviewed  Labs: ordered. Decision-making details documented in ED Course.     Details: No leukocytosis noted. UA- Nitrate, leukocytes, bacteria, and WBCs noted. Informed patient of results.   Radiology: ordered.     Details: CT- Bilateral renal cysts which appear to be simple cysts Cholelithiasis with no convincing evidence cholecystitis. Informed patient of results.   Discussion of management or test interpretation with external provider(s): Due to patient's complaint of R flank pain and UA results, will treat with abx. Patient reports she is aware of renal cysts. Discussed plan of care and interventions with patient. Agreed to and aware of plan of care. Comfortable being discharged home. Patient discharged home. Patient denies new or additional complaints; no further tests indicated at this time. Verbalized  understanding of instructions. No emergent or apparent distress noted prior to discharge. To follow up with PCP in 1 week as needed. Strict ER return precautions given.       Risk  OTC drugs.  Prescription drug management.               ED Course as of 05/20/24 1412   Mon May 20, 2024   1303 NITRITE UA(!): 1+ [JA]   1303 Leukocyte Esterase, UA(!): 25 [JA]   1303 WBC, UA(!): 6-10 [JA]   1303 Bacteria, UA(!): Many [JA]   1303 WBC: 8.66 [JA]      ED Course User Index  [JA] Bette Johnson NP                           Clinical Impression:  Final diagnoses:  [R10.9] Right flank pain (Primary)  [N30.00] Acute cystitis without hematuria  [K80.20] Gallstones  [N28.1] Renal cyst          ED Disposition Condition    Discharge Stable          ED Prescriptions       Medication Sig Dispense Start Date End Date Auth. Provider    cefdinir (OMNICEF) 300 MG capsule Take 1 capsule (300 mg total) by mouth every 12 (twelve) hours. for 7 days 14 capsule 5/20/2024 5/27/2024 Bette Johnson NP    ondansetron (ZOFRAN) 4 MG tablet Take 1 tablet (4 mg total) by mouth every 6 (six) hours as needed for Nausea. 12 tablet 5/20/2024 -- Bette Johnson NP          Follow-up Information       Follow up With Specialties Details Why Contact Info    Roni Mckeon II, MD Internal Medicine Call in 1 week If symptoms worsen, As needed [address] [facility] [address]  [phone]      [facility] - Emergency Dept Emergency Medicine Go to  If symptoms worsen, As needed [address] [facility] [id number] [phone]             Bette Johnson NP  05/20/24 141

## 2024-05-21 ENCOUNTER — PATIENT OUTREACH (OUTPATIENT)
Dept: ADMINISTRATIVE | Facility: HOSPITAL | Age: 64
End: 2024-05-21
Payer: COMMERCIAL

## 2024-05-21 NOTE — PROGRESS NOTES
Population Health. Out Reach. Reviewing patient's chart for quality metrics. I contact pt for f/u outreach and post ed visit. Pt reports relief from ED visit and that she is at work today. Discussed medication compliance and need for ED f/u appt with pcp. Pt reports she did  rxs and taking as directed, pt wish for assistance with obtaining f/u appt with pcp office. Pt also reports she is scheduled for Colonoscopy on 5/31/24. Informed pt will reach out to her once able to scheduled ED f/u appt with pcp office. Pt denies any concerns or issues at this time and voices understanding to instructions given and appreciation.   Called pcp office and obtain ED f/u appt for 6/3/24 at 9 am with NP.  Called pt and notified of scheduled ED f/u appt for 6/3/24 at 9 am with NP.  Pt voices understanding to instructions given and appreciation.     Health Maintenance Topic(s) Outreach Outcomes & Actions Taken:    Osteoporosis Screening - Outreach Outcomes & Actions Taken  : 3/28/24 Dexa scan noted and gap closure.

## 2024-05-28 ENCOUNTER — TELEPHONE (OUTPATIENT)
Dept: INTERNAL MEDICINE | Facility: CLINIC | Age: 64
End: 2024-05-28
Payer: COMMERCIAL

## 2024-05-28 NOTE — TELEPHONE ENCOUNTER
----- Message from Gomez Rudolph LPN sent at 5/28/2024  8:05 AM CDT -----  Regarding: jonnathan shen 6/5 @9:40  Are there any outstanding tasks in chart? No    Is there any documentation of tasks? No    Has the pt seen another physician, been to ER, UCC, or admitted to hospital since last visit?    Has the pt done blood work or imaging since last visit?     5. PLEASE HAVE PATIENT BRING MEDICATION LIST OR BOTTLES TO EVERY OFFICE VISIT

## 2024-05-29 ENCOUNTER — DOCUMENTATION ONLY (OUTPATIENT)
Dept: INTERNAL MEDICINE | Facility: CLINIC | Age: 64
End: 2024-05-29
Payer: COMMERCIAL

## 2024-05-30 DIAGNOSIS — M77.9 INFLAMMATION AROUND JOINT: ICD-10-CM

## 2024-05-30 RX ORDER — CELECOXIB 200 MG/1
CAPSULE ORAL
Qty: 30 CAPSULE | Refills: 2 | Status: SHIPPED | OUTPATIENT
Start: 2024-05-30

## 2024-05-31 LAB — CRC RECOMMENDATION EXT: NORMAL

## 2024-06-03 ENCOUNTER — DOCUMENTATION ONLY (OUTPATIENT)
Dept: INTERNAL MEDICINE | Facility: CLINIC | Age: 64
End: 2024-06-03

## 2024-06-03 ENCOUNTER — TELEPHONE (OUTPATIENT)
Dept: INTERNAL MEDICINE | Facility: CLINIC | Age: 64
End: 2024-06-03

## 2024-06-03 DIAGNOSIS — E11.9 TYPE 2 DIABETES MELLITUS WITHOUT COMPLICATION, WITHOUT LONG-TERM CURRENT USE OF INSULIN: Primary | ICD-10-CM

## 2024-06-03 RX ORDER — TIRZEPATIDE 5 MG/.5ML
5 INJECTION, SOLUTION SUBCUTANEOUS
Qty: 4 PEN | Refills: 0 | Status: SHIPPED | OUTPATIENT
Start: 2024-06-03

## 2024-06-03 NOTE — TELEPHONE ENCOUNTER
----- Message from Ruthy Novak sent at 6/3/2024  3:38 PM CDT -----  Regarding: Refill Request  .Who Called: Lauren Kaba    Refill or New Rx:Refill  RX Name and Strength:tirzepatide (MOUNJARO) 2.5 mg/0.5 mL PnIj  How is the patient currently taking it? (ex. 1XDay):weekly  Is this a 30 day or 90 day RX:30  Local or Mail Order:local   List of preferred pharmacies on file (remove unneeded): [unfilled]  If different Pharmacy is requested, enter Pharmacy information here including location and phone number: [facility]/PHARMACY #[id number] [facility] [address] [place] [address] AT  [address]   Ordering Provider:AVANI Mckeon       Preferred Method of Contact: Phone Call  Patient's Preferred Phone Number on File: [phone]   Best Call Back Number, if different:  Additional Information: pt is requesting an increase in the Mounjaro to 5mg, please advise

## 2024-06-05 ENCOUNTER — TELEPHONE (OUTPATIENT)
Dept: INTERNAL MEDICINE | Facility: CLINIC | Age: 64
End: 2024-06-05

## 2024-06-05 DIAGNOSIS — M81.0 AGE-RELATED OSTEOPOROSIS WITHOUT CURRENT PATHOLOGICAL FRACTURE: Primary | ICD-10-CM

## 2024-06-05 PROBLEM — E66.01 MORBID (SEVERE) OBESITY DUE TO EXCESS CALORIES: Status: RESOLVED | Noted: 2023-04-05 | Resolved: 2024-06-05

## 2024-06-17 ENCOUNTER — INFUSION (OUTPATIENT)
Dept: INFUSION THERAPY | Facility: HOSPITAL | Age: 64
End: 2024-06-17
Attending: INTERNAL MEDICINE
Payer: COMMERCIAL

## 2024-06-17 VITALS
RESPIRATION RATE: 18 BRPM | DIASTOLIC BLOOD PRESSURE: 74 MMHG | HEIGHT: 64 IN | BODY MASS INDEX: 40.12 KG/M2 | HEART RATE: 76 BPM | WEIGHT: 235 LBS | SYSTOLIC BLOOD PRESSURE: 131 MMHG

## 2024-06-17 DIAGNOSIS — M81.0 AGE-RELATED OSTEOPOROSIS WITHOUT CURRENT PATHOLOGICAL FRACTURE: Primary | ICD-10-CM

## 2024-06-17 PROCEDURE — 63600175 PHARM REV CODE 636 W HCPCS: Mod: JZ,JG | Performed by: INTERNAL MEDICINE

## 2024-06-17 PROCEDURE — 96372 THER/PROPH/DIAG INJ SC/IM: CPT

## 2024-06-17 RX ADMIN — DENOSUMAB 60 MG: 60 INJECTION SUBCUTANEOUS at 04:06

## 2024-07-01 ENCOUNTER — TELEPHONE (OUTPATIENT)
Dept: INTERNAL MEDICINE | Facility: CLINIC | Age: 64
End: 2024-07-01
Payer: COMMERCIAL

## 2024-07-01 DIAGNOSIS — E11.9 TYPE 2 DIABETES MELLITUS WITHOUT COMPLICATION, WITHOUT LONG-TERM CURRENT USE OF INSULIN: Primary | ICD-10-CM

## 2024-07-01 NOTE — TELEPHONE ENCOUNTER
----- Message from [facility] sent at 6/28/2024  1:46 PM CDT -----  Regarding: refill  .Type:  RX Refill Request    Who Called:  pt    Refill or New Rx: refill    RX Name and Strength: tirzepatide (MOUNJARO) 5 mg/0.5 mL PnIj 4 Pen 0 6/3/2024 - No  Sig - Route: Inject 5 mg into the skin every 7 days.         How is the patient currently taking it? (ex. 1XDay): one week    Is this a 30 day or 90 day RX: 90    Preferred Pharmacy with phone number:       [id number] [address] [place] [address] AT [facility]   Phone: [phone]  Fax: [phone]        Local or Mail Order: local    Ordering Provider: Roni    Would the patient rather a call back or a response via [facility]?  Ned    Best Call Back Number: [phone]    Additional Information:  refill

## 2024-07-02 ENCOUNTER — TELEPHONE (OUTPATIENT)
Dept: INTERNAL MEDICINE | Facility: CLINIC | Age: 64
End: 2024-07-02
Payer: COMMERCIAL

## 2024-07-02 NOTE — TELEPHONE ENCOUNTER
----- Message from [facility] sent at 7/2/2024  9:13 AM CDT -----  .Type:  Needs Medical Advice    Who Called:  pt    Symptoms (please be specific):  no     How long has patient had these symptoms:   no    Pharmacy name and phone #:  [id number]  [place] - [address] AT [facility]   Phone: [phone]  Fax: [phone]        Would the patient rather a call back or a response via [facility]?      Best Call Back Number:  [phone]    Additional Information:  this medication needs a PA ( tirzepatide 7.5 mg/0.5 mL PnIj) please advise thanks

## 2024-07-22 DIAGNOSIS — E11.9 TYPE 2 DIABETES MELLITUS WITHOUT COMPLICATION, WITHOUT LONG-TERM CURRENT USE OF INSULIN: ICD-10-CM

## 2024-07-22 RX ORDER — METFORMIN HYDROCHLORIDE 500 MG/1
500 TABLET, EXTENDED RELEASE ORAL 2 TIMES DAILY
Qty: 60 TABLET | Refills: 4 | Status: SHIPPED | OUTPATIENT
Start: 2024-07-22

## 2024-08-22 DIAGNOSIS — M77.9 INFLAMMATION AROUND JOINT: ICD-10-CM

## 2024-08-22 RX ORDER — CELECOXIB 200 MG/1
CAPSULE ORAL
Qty: 30 CAPSULE | Refills: 2 | Status: SHIPPED | OUTPATIENT
Start: 2024-08-22

## 2024-08-24 DIAGNOSIS — E11.9 TYPE 2 DIABETES MELLITUS WITHOUT COMPLICATION, WITHOUT LONG-TERM CURRENT USE OF INSULIN: ICD-10-CM

## 2024-08-26 RX ORDER — TIRZEPATIDE 7.5 MG/.5ML
INJECTION, SOLUTION SUBCUTANEOUS
Qty: 4 PEN | Refills: 3 | Status: SHIPPED | OUTPATIENT
Start: 2024-08-26

## 2024-10-07 ENCOUNTER — TELEPHONE (OUTPATIENT)
Dept: INTERNAL MEDICINE | Facility: CLINIC | Age: 64
End: 2024-10-07
Payer: COMMERCIAL

## 2024-10-07 DIAGNOSIS — I48.92 ATRIAL FLUTTER, UNSPECIFIED TYPE: ICD-10-CM

## 2024-10-07 DIAGNOSIS — E11.9 TYPE 2 DIABETES MELLITUS WITHOUT COMPLICATION, WITHOUT LONG-TERM CURRENT USE OF INSULIN: Primary | ICD-10-CM

## 2024-10-07 DIAGNOSIS — E66.01 MORBID (SEVERE) OBESITY DUE TO EXCESS CALORIES: ICD-10-CM

## 2024-10-07 DIAGNOSIS — R53.83 FATIGUE, UNSPECIFIED TYPE: ICD-10-CM

## 2024-10-07 DIAGNOSIS — I10 HYPERTENSION, UNSPECIFIED TYPE: ICD-10-CM

## 2024-10-07 NOTE — TELEPHONE ENCOUNTER
----- Message from Nurse Dyer sent at 10/7/2024  7:53 AM CDT -----  Regarding: jonnathan shen 10/15 @9:40  Are there any outstanding tasks in patient chart? Needs fasting labs    Is there documentation of outstanding tasks in patient chart? no    Has patient been to the ER, urgent care, or another physician since last visit?    Has patient done any blood work or x-rays since last visit?    5. PLEASE HAVE PATIENT BRING MEDICATION LIST OR BOTTLES TO EVERY OFFICE VISIT

## 2024-10-08 ENCOUNTER — LAB VISIT (OUTPATIENT)
Dept: LAB | Facility: HOSPITAL | Age: 64
End: 2024-10-08
Attending: INTERNAL MEDICINE
Payer: COMMERCIAL

## 2024-10-08 DIAGNOSIS — R53.83 FATIGUE, UNSPECIFIED TYPE: ICD-10-CM

## 2024-10-08 DIAGNOSIS — E66.01 MORBID (SEVERE) OBESITY DUE TO EXCESS CALORIES: ICD-10-CM

## 2024-10-08 DIAGNOSIS — E11.9 TYPE 2 DIABETES MELLITUS WITHOUT COMPLICATION, WITHOUT LONG-TERM CURRENT USE OF INSULIN: ICD-10-CM

## 2024-10-08 DIAGNOSIS — I48.92 ATRIAL FLUTTER, UNSPECIFIED TYPE: ICD-10-CM

## 2024-10-08 DIAGNOSIS — I10 HYPERTENSION, UNSPECIFIED TYPE: ICD-10-CM

## 2024-10-08 LAB
ALBUMIN SERPL-MCNC: 4 G/DL (ref 3.4–4.8)
ALBUMIN/GLOB SERPL: 1.4 RATIO (ref 1.1–2)
ALP SERPL-CCNC: 72 UNIT/L (ref 40–150)
ALT SERPL-CCNC: 51 UNIT/L (ref 0–55)
ANION GAP SERPL CALC-SCNC: 8 MEQ/L
AST SERPL-CCNC: 39 UNIT/L (ref 5–34)
BILIRUB SERPL-MCNC: 1 MG/DL
BUN SERPL-MCNC: 14 MG/DL (ref 9.8–20.1)
CALCIUM SERPL-MCNC: 9.3 MG/DL (ref 8.4–10.2)
CHLORIDE SERPL-SCNC: 108 MMOL/L (ref 98–107)
CHOLEST SERPL-MCNC: 152 MG/DL
CHOLEST/HDLC SERPL: 3 {RATIO} (ref 0–5)
CO2 SERPL-SCNC: 28 MMOL/L (ref 23–31)
CREAT SERPL-MCNC: 0.68 MG/DL (ref 0.55–1.02)
CREAT/UREA NIT SERPL: 21
EST. AVERAGE GLUCOSE BLD GHB EST-MCNC: 125.5 MG/DL
GFR SERPLBLD CREATININE-BSD FMLA CKD-EPI: >60 ML/MIN/1.73/M2
GLOBULIN SER-MCNC: 2.8 GM/DL (ref 2.4–3.5)
GLUCOSE SERPL-MCNC: 157 MG/DL (ref 82–115)
HBA1C MFR BLD: 6 %
HDLC SERPL-MCNC: 44 MG/DL (ref 35–60)
LDLC SERPL CALC-MCNC: 68 MG/DL (ref 50–140)
POTASSIUM SERPL-SCNC: 4.2 MMOL/L (ref 3.5–5.1)
PROT SERPL-MCNC: 6.8 GM/DL (ref 5.8–7.6)
SODIUM SERPL-SCNC: 144 MMOL/L (ref 136–145)
TRIGL SERPL-MCNC: 199 MG/DL (ref 37–140)
VLDLC SERPL CALC-MCNC: 40 MG/DL

## 2024-10-08 PROCEDURE — 80053 COMPREHEN METABOLIC PANEL: CPT

## 2024-10-08 PROCEDURE — 83036 HEMOGLOBIN GLYCOSYLATED A1C: CPT

## 2024-10-08 PROCEDURE — 80061 LIPID PANEL: CPT

## 2024-10-08 PROCEDURE — 36415 COLL VENOUS BLD VENIPUNCTURE: CPT

## 2024-10-15 ENCOUNTER — OFFICE VISIT (OUTPATIENT)
Dept: INTERNAL MEDICINE | Facility: CLINIC | Age: 64
End: 2024-10-15
Payer: COMMERCIAL

## 2024-10-15 VITALS
HEIGHT: 64 IN | DIASTOLIC BLOOD PRESSURE: 68 MMHG | BODY MASS INDEX: 38.41 KG/M2 | HEART RATE: 78 BPM | WEIGHT: 225 LBS | SYSTOLIC BLOOD PRESSURE: 110 MMHG | RESPIRATION RATE: 18 BRPM | OXYGEN SATURATION: 98 %

## 2024-10-15 DIAGNOSIS — I48.20 CHRONIC A-FIB: Primary | ICD-10-CM

## 2024-10-15 DIAGNOSIS — S52.502E TYPE I OR II OPEN FRACTURE OF DISTAL END OF LEFT RADIUS WITH ROUTINE HEALING, UNSPECIFIED FRACTURE MORPHOLOGY, SUBSEQUENT ENCOUNTER: ICD-10-CM

## 2024-10-15 DIAGNOSIS — E11.9 TYPE 2 DIABETES MELLITUS WITHOUT COMPLICATION, WITHOUT LONG-TERM CURRENT USE OF INSULIN: ICD-10-CM

## 2024-10-15 PROCEDURE — 3074F SYST BP LT 130 MM HG: CPT | Mod: CPTII,,, | Performed by: INTERNAL MEDICINE

## 2024-10-15 PROCEDURE — 1159F MED LIST DOCD IN RCRD: CPT | Mod: CPTII,,, | Performed by: INTERNAL MEDICINE

## 2024-10-15 PROCEDURE — 3061F NEG MICROALBUMINURIA REV: CPT | Mod: CPTII,,, | Performed by: INTERNAL MEDICINE

## 2024-10-15 PROCEDURE — 3044F HG A1C LEVEL LT 7.0%: CPT | Mod: CPTII,,, | Performed by: INTERNAL MEDICINE

## 2024-10-15 PROCEDURE — 3008F BODY MASS INDEX DOCD: CPT | Mod: CPTII,,, | Performed by: INTERNAL MEDICINE

## 2024-10-15 PROCEDURE — 1101F PT FALLS ASSESS-DOCD LE1/YR: CPT | Mod: CPTII,,, | Performed by: INTERNAL MEDICINE

## 2024-10-15 PROCEDURE — 1160F RVW MEDS BY RX/DR IN RCRD: CPT | Mod: CPTII,,, | Performed by: INTERNAL MEDICINE

## 2024-10-15 PROCEDURE — 3288F FALL RISK ASSESSMENT DOCD: CPT | Mod: CPTII,,, | Performed by: INTERNAL MEDICINE

## 2024-10-15 PROCEDURE — 3066F NEPHROPATHY DOC TX: CPT | Mod: CPTII,,, | Performed by: INTERNAL MEDICINE

## 2024-10-15 PROCEDURE — 3078F DIAST BP <80 MM HG: CPT | Mod: CPTII,,, | Performed by: INTERNAL MEDICINE

## 2024-10-15 PROCEDURE — 99214 OFFICE O/P EST MOD 30 MIN: CPT | Mod: ,,, | Performed by: INTERNAL MEDICINE

## 2024-10-15 RX ORDER — MULTIVIT WITH CALCIUM,IRON,MIN 18MG-0.4MG
TABLET ORAL
COMMUNITY

## 2024-10-15 NOTE — PROGRESS NOTES
Patient ID: Lauren Kaba is a 64 y.o. female.    Chief Complaint: Follow-up (6 month f/u, labs done 10/8)      HPI:   Patient presents here today for above reason.     Lauren is a 64-year-old female presents today in follow-up.  A1c is down nicely from 7.5-6.0.  Metformin is causing some diarrhea and some abdominal issues.  Again A1c is 6.0.  Does see EP and also cardiovascular for her atrial fib atrial flutter.  She was status post ablation currently rate controlled she is on diltiazem sotalol and Eliquis.  Otherwise doing great here for follow-up.        The patient's Health Maintenance was reviewed and the following appears to be due at this time:   Health Maintenance Due   Topic Date Due    Hepatitis C Screening  Never done    Pneumococcal Vaccines (Age 65+) (1 of 2 - PCV) Never done    Foot Exam  Never done    RSV Vaccine (Age 60+ and Pregnant patients) (1 - Risk 60-74 years 1-dose series) Never done    Eye Exam  08/29/2024    Influenza Vaccine (1) 09/01/2024    COVID-19 Vaccine (7 - 2024-25 season) 09/01/2024        Past Medical History:  Past Medical History:   Diagnosis Date    Arthritis     Atrial fibrillation     Diabetes mellitus     Fatigue     History of kidney stones     Obesity     Personal history of colonic polyps 09/27/2016    Sleep apnea     uses CPAP    Type I or II open fracture of distal end of left radius with routine healing, unspecified fracture morphology, subsequent encounter      Past Surgical History:   Procedure Laterality Date    BREAST SURGERY  11/2013    Reduction    CARDIAC ELECTROPHYSIOLOGY STUDY AND ABLATION      CARDIOVERSION      x 3    COLONOSCOPY W/ POLYPECTOMY  09/27/2016    LITHOTRIPSY, WITH CYSTOSCOPY  2018    OPEN REDUCTION AND INTERNAL FIXATION (ORIF) OF FRACTURE OF RADIUS Left 08/24/2023    Procedure: ORIF, FRACTURE, RADIUS;  Surgeon: Keith Goodwin DO;  Location: [facility];  Service: Orthopedics;  Laterality: Left;  Distal Radius. no block, supine hand table c arm  skeletal dyamics, wash stuff    OPEN REDUCTION AND INTERNAL FIXATION (ORIF) OF INJURY OF WRIST Left 12/20/2023    Procedure: ORIF, WRIST;  Surgeon: Keith Goodwin DO;  Location: [facility] OR;  Service: Orthopedics;  Laterality: Left;    REMOVAL,ORTHOPEDIC HARDWARE,UPPER EXTREMITY Left 12/20/2023    Procedure: REMOVAL,ORTHOPEDIC HARDWARE,UPPER EXTREMITY;  Surgeon: Keith Goodwin DO;  Location: [facility] OR;  Service: Orthopedics;  Laterality: Left;  Left wrist hardware removal, supine. Vascular, hand table, C-arm    REVISION OF KNEE ARTHROPLASTY Right     TONSILLECTOMY  1989    TOTAL KNEE ARTHROPLASTY Right 05/2013     Review of patient's allergies indicates:   Allergen Reactions    Cipro [ciprofloxacin hcl] Hives     Current Outpatient Medications on File Prior to Visit   Medication Sig Dispense Refill    ACCU-CHEK GUIDE TEST STRIPS Strp USE TO TEST BLOOD SUGAR TWO TIMES A  strip 11    acetaminophen (TYLENOL) 500 MG tablet Take 1,000 mg by mouth every 6 (six) hours as needed for Pain.      apixaban (ELIQUIS) 5 mg Tab Take 5 mg by mouth 2 (two) times daily.      ascorbic acid, vitamin C, (VITAMIN C) 500 MG tablet Take 500 mg by mouth once daily.      Bifidobacterium infantis (ALIGN ORAL) Take 1 capsule by mouth once daily.      calcium carb/vit D3/minerals (CALCIUM-VITAMIN D ORAL) Take by mouth.      celecoxib (CELEBREX) 200 MG capsule TAKE ONE CAPSULE BY MOUTH EVERY DAY 30 capsule 2    diltiaZEM (CARDIZEM CD) 120 MG Cp24 Take 240 mg by mouth 2 (two) times a day.      fexofenadine (ALLEGRA) 180 MG tablet Take 180 mg by mouth every evening.      fluticasone propionate (FLONASE) 50 mcg/actuation nasal spray 2 sprays by Each Nostril route nightly as needed for Allergies or Rhinitis.      folic acid/multivit-min/lutein (CENTRUM SILVER ORAL) Take 1 tablet by mouth once daily.      glucosamine HCl (GLUCOSAMINE, BULK, MISC) Take by mouth 2 (two) times a day.      glucosamine-chondroitin 500-400 mg Cap Take by mouth.       metFORMIN (GLUCOPHAGE-XR) 500 MG ER 24hr tablet TAKE ONE TABLET BY MOUTH TWICE DAILY 60 tablet 4    omega 3-dha-epa-fish oil 1,200 (144-216) mg Cap Take 1 capsule by mouth once daily.      sotaloL (BETAPACE) 160 MG Tab Take 160 mg by mouth 2 (two) times daily.      tirzepatide (MOUNJARO) 7.5 mg/0.5 mL PnIj INJECT 7.5 MG SUBCUTANEOUSLY EVERY 7 DAYS 4 Pen 3    vitamin D (VITAMIN D3) 1000 units Tab Take 1,000 Units by mouth once daily.      ondansetron (ZOFRAN) 4 MG tablet Take 1 tablet (4 mg total) by mouth every 6 (six) hours as needed for Nausea. 12 tablet 0    [DISCONTINUED] rosuvastatin (CRESTOR) 20 MG tablet Take 20 mg by mouth every evening. (Patient not taking: Reported on 10/15/2024)       Current Facility-Administered Medications on File Prior to Visit   Medication Dose Route Frequency Provider Last Rate Last Admin    sodium chloride 0.9% flush 10 mL  10 mL Intravenous PRN Yolanda Santamaria PA-C         Social History     Socioeconomic History    Marital status:    Tobacco Use    Smoking status: Never    Smokeless tobacco: Never   Substance and Sexual Activity    Alcohol use: Never    Drug use: Never    Sexual activity: Not Currently     Social Drivers of Health     Financial Resource Strain: Low Risk  (6/4/2024)    Overall Financial Resource Strain (CARDIA)     Difficulty of Paying Living Expenses: Not very hard   Food Insecurity: No Food Insecurity (6/4/2024)    Hunger Vital Sign     Worried About Running Out of Food in the Last Year: Never true     Ran Out of Food in the Last Year: Never true   Transportation Needs: No Transportation Needs (8/25/2023)    PRAPARE - Transportation     Lack of Transportation (Medical): No     Lack of Transportation (Non-Medical): No   Physical Activity: Unknown (6/4/2024)    Exercise Vital Sign     Days of Exercise per Week: 0 days   Stress: No Stress Concern Present (6/4/2024)    [employer] - Occupational Stress Questionnaire      "Feeling of Stress : Not at all   Housing Stability: Unknown (8/25/2023)    Housing Stability Vital Sign     Unable to Pay for Housing in the Last Year: No     Family History   Problem Relation Name Age of Onset    Cancer Mother Mother, father, and sister     Diabetes Mother Mother, father, and sister     Arthritis Father Both parents     Heart disease Father Both parents     PONV Sister         ROS:   Comprehensive review of systems was performed and is negative except as noted above    Vitals/PE:   /68   Pulse 78   Resp 18   Ht 5' 4" (1.626 m)   Wt 102.1 kg (225 lb)   SpO2 98%   BMI 38.62 kg/m²   Physical Exam    General: Alert and oriented, No acute distress.   Eye: Normal conjunctiva without exudate.  HENMT: Normocephalic/AT, Normal hearing, Oral mucosa is moist and pink   Neck: No goiter visualized.   Respiratory: Lungs CTAB, Respirations are non-labored, Breath sounds are equal, Symmetrical chest wall expansion.  Cardiovascular: Normal rate, Regular rhythm, No murmur, No edema.   Gastrointestinal: Non-distended.   Genitourinary: Deferred.  Musculoskeletal: Normal ROM, Normal gait, No deformities or amputations.  Integumentary: Warm, Dry, Intact. No diaphoresis, or flushing.  Neurologic: No focal deficits, Cranial Nerves II-XII are grossly intact.   Psychiatric: Cooperative, Appropriate mood & affect, Normal judgment, Non-suicidal.    Assessment/Plan:       1. Chronic a-fib    2. Type 2 diabetes mellitus without complication, without long-term current use of insulin    3. Type I or II open fracture of distal end of left radius with routine healing, unspecified fracture morphology, subsequent encounter         Plan:   One okay to stop the metformin continue Mounjaro will increase her dose to 10 mg weekly.  Repeat A1c in 6 months if elevated may add sulfonylurea or Farxiga.  2.  She was rate controlled on beta-blocker calcium channel blocker and also on oral anticoagulation will be seeing EP today  3.  " Screening up-to-date here for follow-up.    Education and counseling done face to face regarding medical conditions and plan. Contact office if new symptoms develop. Should any symptoms ever significantly worsen seek emergency medical attention/go to ER. Follow up at least yearly for wellness or sooner PRN. Nurse to call patient with any results. The patient is receptive, expresses understanding and is agreeable to plan. All questions have been answered.    No follow-ups on file.

## 2024-10-22 ENCOUNTER — PATIENT OUTREACH (OUTPATIENT)
Facility: CLINIC | Age: 64
End: 2024-10-22
Payer: COMMERCIAL

## 2024-10-22 NOTE — PROGRESS NOTES
Population Health. Out Reach. Reviewing patient's chart for quality metrics.I contacted patient re: hm, noted scheduled appts and importance of health screenings and flu and covid. Pt voices understanding and appreciation.   Pt reports she is waiting til January 2025 to do dm eye due to she is getting vision insurance. Pt also reports that she is planning on getting flu/covid vaccine at a Health Fair that she is scheduled to attend on tomorrow with her job. Pt also reports that she seen Dr. Leif Soler a podiatry on yesterday, but it was not for dm foot exam and she will work on trying to get dm foot exam with podiatry or her pcp.     Health Maintenance Topic(s) Outreach Outcomes & Actions Taken:    Eye Exam - Outreach Outcomes & Actions Taken  : Pt Will Schedule with External Provider / Order Routed & Care Team Updated if Applicable and will do in January 2025    Diabetic Foot Exam - Outreach Outcomes & Actions Taken  : note added pt due for dm foot exam         Care Management, Digital Medicine, and/or Education Referrals      Next Steps - Referral Actions: pt not eligible for digital medicine

## 2024-11-26 ENCOUNTER — TELEPHONE (OUTPATIENT)
Dept: INTERNAL MEDICINE | Facility: CLINIC | Age: 64
End: 2024-11-26
Payer: COMMERCIAL

## 2024-11-27 DIAGNOSIS — M77.9 INFLAMMATION AROUND JOINT: ICD-10-CM

## 2024-11-27 RX ORDER — CELECOXIB 200 MG/1
200 CAPSULE ORAL
Qty: 30 CAPSULE | Refills: 5 | Status: SHIPPED | OUTPATIENT
Start: 2024-11-27

## 2024-12-09 DIAGNOSIS — E11.9 TYPE 2 DIABETES MELLITUS WITHOUT COMPLICATION, UNSPECIFIED WHETHER LONG TERM INSULIN USE: ICD-10-CM

## 2024-12-10 RX ORDER — BLOOD SUGAR DIAGNOSTIC
STRIP MISCELLANEOUS
Qty: 100 STRIP | Refills: 9 | Status: SHIPPED | OUTPATIENT
Start: 2024-12-10

## 2024-12-17 ENCOUNTER — INFUSION (OUTPATIENT)
Dept: INFUSION THERAPY | Facility: HOSPITAL | Age: 64
End: 2024-12-17
Attending: INTERNAL MEDICINE
Payer: COMMERCIAL

## 2024-12-17 VITALS
SYSTOLIC BLOOD PRESSURE: 129 MMHG | RESPIRATION RATE: 18 BRPM | DIASTOLIC BLOOD PRESSURE: 70 MMHG | HEART RATE: 87 BPM | WEIGHT: 222.69 LBS | OXYGEN SATURATION: 98 % | BODY MASS INDEX: 38.02 KG/M2 | HEIGHT: 64 IN

## 2024-12-17 DIAGNOSIS — M81.0 AGE-RELATED OSTEOPOROSIS WITHOUT CURRENT PATHOLOGICAL FRACTURE: Primary | ICD-10-CM

## 2024-12-17 PROCEDURE — 96372 THER/PROPH/DIAG INJ SC/IM: CPT

## 2024-12-17 PROCEDURE — 63600175 PHARM REV CODE 636 W HCPCS: Mod: JZ,JG | Performed by: INTERNAL MEDICINE

## 2024-12-17 RX ADMIN — DENOSUMAB 60 MG: 60 INJECTION SUBCUTANEOUS at 03:12

## 2024-12-17 NOTE — NURSING
Prolia injection given, tolerated well. Pt denied any recent dental work within the past 3 months as well as any upcoming invasive dental procedures. Pt discharged in stable condition, next appt given.

## 2025-01-22 ENCOUNTER — PATIENT OUTREACH (OUTPATIENT)
Facility: CLINIC | Age: 65
End: 2025-01-22
Payer: COMMERCIAL

## 2025-01-22 NOTE — Clinical Note
Statin Therapy for Prevention of CVD in Patients with Diabetes Please review pt for statin. Current dx of Diabetes Mellitus triggers requirement for statin. If patient has statin intolerance, please use any of the following as appropriate:  ·        M79.10 Myalgia, unspecified site ·        M60.9 Myositis ·        G72.9 Myopathy ·        G72.0 Drug- induced myopathy Exclusion must be documented annually as an active problem and dropped on a claim every year (Jan1 - Dec 31) for patient to be and remain excluded. If last visit is less than 30 days, you can addendum, then code will drop at next visit.

## 2025-01-22 NOTE — PROGRESS NOTES
Population Health. Out Reach. Reviewing patient's chart for quality metrics.I contacted patient re: hm. Discussed upcoming scheduled pcp appt 4/25/25 at 10:20 am. Hm due and importance of health screenings and health care, medication and dash/diabetic diet compliance. Pt reports she got her flu shot with [facility] at a work fair in October 2024. Pt voices understanding and appreciation.     Health Maintenance Topic(s) Outreach Outcomes & Actions Taken:    Breast Cancer Screening - Outreach Outcomes & Actions Taken  : remind pt due for mmg after 3/28/25, pt reports she will see her gyn Dr. Urias and she will order mammogram.    Eye Exam - Outreach Outcomes & Actions Taken  : pt reports she had dm eye exam scheduled for this month, but had to r/s due to the weather, r/s  for 2/13/25 with Dr. Stephanie Culp      Medication Adherence / Statins - Outreach Outcomes & Actions Taken  : Sent Provider Message to Review to Evaluate Pt for Statin, Add Exclusion Dx Codes, Document Exclusion in Problem List, Change Statin Intensity Level to Moderate or High Intensity if Applicable  and sent message to pcp via wrap up.    Diabetic Foot Exam - Outreach Outcomes & Actions Taken  : remind pt due for dm foot exam, pt to discuss with pcp at next visit on 4/25/25         Additional Notes:  Pt denies sdoh barriers at this time.  Place note pt due for dm foot exam and statin review       Care Management, Digital Medicine, and/or Education Referrals      Pt not eligible for digital medicine

## 2025-01-31 LAB
HUMAN PAPILLOMAVIRUS (HPV): NORMAL
PAP RECOMMENDATION EXT: NORMAL
PAP SMEAR: NORMAL

## 2025-02-08 LAB
LEFT EYE DM RETINOPATHY: NEGATIVE
RIGHT EYE DM RETINOPATHY: NEGATIVE

## 2025-02-10 ENCOUNTER — PATIENT OUTREACH (OUTPATIENT)
Facility: CLINIC | Age: 65
End: 2025-02-10
Payer: COMMERCIAL

## 2025-02-10 NOTE — PROGRESS NOTES
Health Maintenance Topic(s) Outreach Outcomes & Actions Taken:    Cervical Cancer Screening - Outreach Outcomes & Actions Taken  : External Records Uploaded & Care Team Updated if Applicable     Additional Notes:  Upload Pap Smear/HPV 1/31/2025

## 2025-02-12 ENCOUNTER — TELEPHONE (OUTPATIENT)
Dept: INTERNAL MEDICINE | Facility: CLINIC | Age: 65
End: 2025-02-12
Payer: COMMERCIAL

## 2025-02-12 NOTE — TELEPHONE ENCOUNTER
Spoke with insurance, they say her card on file has termed. Spoke with pt she verified same ID #. She will contact her insurance tomorrow to find out the hold up and who her pharmacy benefits are with for me to complete PA.

## 2025-02-12 NOTE — TELEPHONE ENCOUNTER
----- Message from Luther sent at 2/12/2025  2:53 PM CST -----  Who Called: Lauren Kaba    Caller is requesting assistance/information from provider's office.    Symptoms (please be specific):    How long has patient had these symptoms:    List of preferred pharmacies on file (remove unneeded): [unfilled]  If different, enter pharmacy into here including location and phone number:       Preferred Method of Contact: Phone Call  Patient's Preferred Phone Number on File: [phone]   Best Call Back Number, if different:  Additional Information: Pt state she spoke with clinic and was advised a PA would be sent in for tirzepatide 10 mg/0.5 mL PnIj  Pr requesting update. #Pt also wanted to make note her flu shpt was taken on 10/23/24 states she was asked before and forgot the date.

## 2025-02-12 NOTE — TELEPHONE ENCOUNTER
No previous request for PA on file. Just requested PA through [employer] and will submit when received.

## 2025-02-12 NOTE — TELEPHONE ENCOUNTER
Spoke with pt and advised paperwork will be taken care of when received. Can you please let me know once this is done so I can let pt know. Thanks.

## 2025-03-12 ENCOUNTER — TELEPHONE (OUTPATIENT)
Dept: INTERNAL MEDICINE | Facility: CLINIC | Age: 65
End: 2025-03-12
Payer: COMMERCIAL

## 2025-03-12 DIAGNOSIS — E11.9 TYPE 2 DIABETES MELLITUS WITHOUT COMPLICATION, UNSPECIFIED WHETHER LONG TERM INSULIN USE: Primary | ICD-10-CM

## 2025-03-12 RX ORDER — TIRZEPATIDE 12.5 MG/.5ML
12.5 INJECTION, SOLUTION SUBCUTANEOUS
Qty: 2 ML | Refills: 0 | Status: SHIPPED | OUTPATIENT
Start: 2025-03-12

## 2025-03-12 NOTE — TELEPHONE ENCOUNTER
Message  Received: Today  Sujey, Janette Tamayo Staff  Caller: Unspecified (Today,  9:02 AM)  .Who Called: Lauren Kaba        Preferred Method of Contact: Phone Call  Patient's Preferred Phone Number on File: [phone]  Best Call Back Number, if different:  Additional Information: pt calling for increase in next dose of [facility]o 12[facility] at [facility]

## 2025-03-27 ENCOUNTER — PATIENT OUTREACH (OUTPATIENT)
Facility: CLINIC | Age: 65
End: 2025-03-27
Payer: COMMERCIAL

## 2025-03-27 NOTE — PROGRESS NOTES
Health Maintenance Topic(s) Outreach Outcomes & Actions Taken:    Eye Exam - Outreach Outcomes & Actions Taken  : Diabetic Eye External Records Uploaded, Care Team & History Updated if Applicable     Additional Notes:  DM Eye Exam 2/8/25

## 2025-04-07 DIAGNOSIS — E11.9 TYPE 2 DIABETES MELLITUS WITHOUT COMPLICATION, UNSPECIFIED WHETHER LONG TERM INSULIN USE: ICD-10-CM

## 2025-04-07 RX ORDER — TIRZEPATIDE 12.5 MG/.5ML
12.5 INJECTION, SOLUTION SUBCUTANEOUS
Qty: 2 ML | Refills: 0 | Status: SHIPPED | OUTPATIENT
Start: 2025-04-07

## 2025-04-16 DIAGNOSIS — I48.92 ATRIAL FLUTTER, UNSPECIFIED TYPE: Primary | ICD-10-CM

## 2025-04-16 DIAGNOSIS — M81.0 AGE-RELATED OSTEOPOROSIS WITHOUT CURRENT PATHOLOGICAL FRACTURE: ICD-10-CM

## 2025-04-16 DIAGNOSIS — R53.83 FATIGUE, UNSPECIFIED TYPE: ICD-10-CM

## 2025-04-16 DIAGNOSIS — I10 HYPERTENSION, UNSPECIFIED TYPE: ICD-10-CM

## 2025-04-16 DIAGNOSIS — E11.9 TYPE 2 DIABETES MELLITUS WITHOUT COMPLICATION, WITHOUT LONG-TERM CURRENT USE OF INSULIN: ICD-10-CM

## 2025-04-17 ENCOUNTER — TELEPHONE (OUTPATIENT)
Dept: INTERNAL MEDICINE | Facility: CLINIC | Age: 65
End: 2025-04-17
Payer: COMMERCIAL

## 2025-04-17 NOTE — TELEPHONE ENCOUNTER
----- Message from Nurse Dyer sent at 4/16/2025  7:54 AM CDT -----  Regarding: jonnathan shen 4/25 @10:20  Are there any outstanding tasks in patient chart? Needs fasting labsIs there documentation of outstanding tasks in patient chart? noHas patient been to the ER, urgent care, or another physician since last visit?Has patient done any blood work or x-rays since last visit?5. PLEASE HAVE PATIENT BRING MEDICATION LIST OR BOTTLES TO EVERY OFFICE VISIT

## 2025-04-21 ENCOUNTER — LAB VISIT (OUTPATIENT)
Dept: LAB | Facility: HOSPITAL | Age: 65
End: 2025-04-21
Attending: INTERNAL MEDICINE
Payer: COMMERCIAL

## 2025-04-21 DIAGNOSIS — I48.92 ATRIAL FLUTTER, UNSPECIFIED TYPE: ICD-10-CM

## 2025-04-21 DIAGNOSIS — M81.0 AGE-RELATED OSTEOPOROSIS WITHOUT CURRENT PATHOLOGICAL FRACTURE: ICD-10-CM

## 2025-04-21 DIAGNOSIS — E11.9 TYPE 2 DIABETES MELLITUS WITHOUT COMPLICATION, WITHOUT LONG-TERM CURRENT USE OF INSULIN: ICD-10-CM

## 2025-04-21 DIAGNOSIS — R53.83 FATIGUE, UNSPECIFIED TYPE: ICD-10-CM

## 2025-04-21 DIAGNOSIS — I10 HYPERTENSION, UNSPECIFIED TYPE: ICD-10-CM

## 2025-04-21 LAB
ALBUMIN SERPL-MCNC: 4.1 G/DL (ref 3.4–4.8)
ALBUMIN/GLOB SERPL: 1.3 RATIO (ref 1.1–2)
ALP SERPL-CCNC: 63 UNIT/L (ref 40–150)
ALT SERPL-CCNC: 32 UNIT/L (ref 0–55)
ANION GAP SERPL CALC-SCNC: 7 MEQ/L
AST SERPL-CCNC: 22 UNIT/L (ref 11–45)
BASOPHILS # BLD AUTO: 0.05 X10(3)/MCL
BASOPHILS NFR BLD AUTO: 0.7 %
BILIRUB SERPL-MCNC: 1.5 MG/DL
BUN SERPL-MCNC: 21.3 MG/DL (ref 9.8–20.1)
CALCIUM SERPL-MCNC: 9.9 MG/DL (ref 8.4–10.2)
CHLORIDE SERPL-SCNC: 107 MMOL/L (ref 98–107)
CHOLEST SERPL-MCNC: 170 MG/DL
CHOLEST/HDLC SERPL: 3 {RATIO} (ref 0–5)
CO2 SERPL-SCNC: 25 MMOL/L (ref 23–31)
CREAT SERPL-MCNC: 0.75 MG/DL (ref 0.55–1.02)
CREAT UR-MCNC: 79 MG/DL (ref 45–106)
CREAT/UREA NIT SERPL: 28
EOSINOPHIL # BLD AUTO: 0.17 X10(3)/MCL (ref 0–0.9)
EOSINOPHIL NFR BLD AUTO: 2.3 %
ERYTHROCYTE [DISTWIDTH] IN BLOOD BY AUTOMATED COUNT: 12.8 % (ref 11.5–17)
EST. AVERAGE GLUCOSE BLD GHB EST-MCNC: 131.2 MG/DL
GFR SERPLBLD CREATININE-BSD FMLA CKD-EPI: >60 ML/MIN/1.73/M2
GLOBULIN SER-MCNC: 3.1 GM/DL (ref 2.4–3.5)
GLUCOSE SERPL-MCNC: 128 MG/DL (ref 82–115)
HBA1C MFR BLD: 6.2 %
HCT VFR BLD AUTO: 39.5 % (ref 37–47)
HDLC SERPL-MCNC: 51 MG/DL (ref 35–60)
HGB BLD-MCNC: 13 G/DL (ref 12–16)
IMM GRANULOCYTES # BLD AUTO: 0.03 X10(3)/MCL (ref 0–0.04)
IMM GRANULOCYTES NFR BLD AUTO: 0.4 %
LDLC SERPL CALC-MCNC: 83 MG/DL (ref 50–140)
LYMPHOCYTES # BLD AUTO: 2.26 X10(3)/MCL (ref 0.6–4.6)
LYMPHOCYTES NFR BLD AUTO: 30.8 %
MCH RBC QN AUTO: 28.8 PG (ref 27–31)
MCHC RBC AUTO-ENTMCNC: 32.9 G/DL (ref 33–36)
MCV RBC AUTO: 87.4 FL (ref 80–94)
MICROALBUMIN UR-MCNC: 7.3 UG/ML
MICROALBUMIN/CREAT RATIO PNL UR: 9.2 MG/GM CR (ref 0–30)
MONOCYTES # BLD AUTO: 0.59 X10(3)/MCL (ref 0.1–1.3)
MONOCYTES NFR BLD AUTO: 8 %
NEUTROPHILS # BLD AUTO: 4.23 X10(3)/MCL (ref 2.1–9.2)
NEUTROPHILS NFR BLD AUTO: 57.8 %
NRBC BLD AUTO-RTO: 0 %
PLATELET # BLD AUTO: 149 X10(3)/MCL (ref 130–400)
PMV BLD AUTO: 9 FL (ref 7.4–10.4)
POTASSIUM SERPL-SCNC: 4.5 MMOL/L (ref 3.5–5.1)
PROT SERPL-MCNC: 7.2 GM/DL (ref 5.8–7.6)
RBC # BLD AUTO: 4.52 X10(6)/MCL (ref 4.2–5.4)
SODIUM SERPL-SCNC: 139 MMOL/L (ref 136–145)
TRIGL SERPL-MCNC: 179 MG/DL (ref 37–140)
TSH SERPL-ACNC: 0.87 UIU/ML (ref 0.35–4.94)
VLDLC SERPL CALC-MCNC: 36 MG/DL
WBC # BLD AUTO: 7.33 X10(3)/MCL (ref 4.5–11.5)

## 2025-04-21 PROCEDURE — 82570 ASSAY OF URINE CREATININE: CPT

## 2025-04-21 PROCEDURE — 83036 HEMOGLOBIN GLYCOSYLATED A1C: CPT

## 2025-04-21 PROCEDURE — 80053 COMPREHEN METABOLIC PANEL: CPT

## 2025-04-21 PROCEDURE — 85025 COMPLETE CBC W/AUTO DIFF WBC: CPT

## 2025-04-21 PROCEDURE — 80061 LIPID PANEL: CPT

## 2025-04-21 PROCEDURE — 36415 COLL VENOUS BLD VENIPUNCTURE: CPT

## 2025-04-21 PROCEDURE — 84443 ASSAY THYROID STIM HORMONE: CPT

## 2025-04-23 ENCOUNTER — PATIENT OUTREACH (OUTPATIENT)
Facility: CLINIC | Age: 65
End: 2025-04-23
Payer: COMMERCIAL

## 2025-04-23 NOTE — Clinical Note
Jose Alfredo request for mammogram report at [facility]. Thanks

## 2025-04-23 NOTE — LETTER
AUTHORIZATION FOR RELEASE OF   CONFIDENTIAL INFORMATION        We are seeing Lauren Kaba, date of birth 1960, in the clinic at [address]. Roni Mckeon II, MD is the patient's PCP. Lauren Kaba has an outstanding lab/procedure at the time we reviewed her chart. In order to help keep her health information updated, she has authorized us to request the following medical record(s):       ( x )  MAMMOGRAM           Present, 3/2022, 2019, 2018, 10/2017                           Please fax records to Roni Mckeon II, MD,  at [phone] or email to [email or web address].         Patient Name: Lauren Kaba  : 1960  Patient Phone #: [phone]                Lauren Kaba  MRN: [id number]  : 1960  Age: 64 y.o.  Sex: female         Patient/Legal Guardian Signature  This signature was collected at 2024           _______________________________   Printed Name/Relationship to Patient      Consent for Examination and Treatment: I hereby authorize the providers and employees of [facility] ([facility]) to provide medical treatment/services which includes, but is not limited to, performing and administering tests and diagnostic procedures that are deemed necessary, including, but not limited to, imaging examinations, blood tests and other laboratory procedures as may be required by the hospital, clinic, or may be ordered by my physician(s) or persons working under the general and/or special instructions of my physician(s).      I understand and agree that this consent covers all authorized persons, including but not limited to physicians, residents, nurse practitioners, physicians' assistants, specialists, consultants, student nurses, and independently contracted physicians, who are called upon by the physician in charge, to carry out the diagnostic procedures and medical or surgical treatment.     I  hereby authorize [facility] to retain or dispose of any specimens or tissue, should there be such remaining from any test or procedure.     I hereby authorize and give consent for [facility] providers and employees to take photographs, images or videotapes of such diagnostic, surgical or treatment procedures of Patient as may be required by [facility] or as may be ordered by a physician. I further acknowledge and agree that [facility] may use cameras or other devices for patient monitoring.     I am aware that the practice of medicine is not an exact science, and I acknowledge that no guarantees have been made to me as to the outcome of any tests, procedures or treatment.     Authorization for Release of Information: I understand that my insurance company and/or their agents may need information necessary to make determinations about payment/reimbursement. I hereby provide authorization to release to all insurance companies, their successors, assignees, other parties with whom they may have contracted, or others acting on their behalf, that are involved with payment for any hospital and/or clinic charges incurred by the patient, any information that they request and deem necessary for payment/reimbursement, and/or quality review.  I further authorize the release of my health information to physicians or other health care practitioners on staff who are involved in my health care now and in the future, and to other health care providers, entities, or institutions for the purpose of my continued care and treatment, including referrals.     REGISTRATION AUTHORIZATION  Form No. 75715 (Rev. 3/25/2024)    Page 1 of 3                       Medicare Patient's Certification and Authorization to Release Information and Payment Request:  I certify that the information given by me in applying for payment under Title XVIII of the Social Security Act is correct. I authorize any weaver of medical or other information about me to release to the  [facility], or its intermediaries or carriers, any information needed for this or a related Medicare claim. I request that payment of authorized benefits be made on my behalf.     Assignment of Insurance Benefits:   I hereby authorize any and all insurance companies, health plans, defined   benefit plans, health insurers or any entity that is or may be responsible for payment of my medical expenses to pay all hospital and medical benefits now due, and to become due and payable to me under any hospital benefits, sick benefits, injury benefits or any other benefit for services rendered to me, including Major Medical Benefits, direct to [facility] and all independently contracted physicians. I assign any and all rights that I may have against any and all insurance companies, health plans, defined benefit plans, health insurers or any entity that is or may be responsible for payment of my medical expenses, including, but not limited to any right to appeal a denial of a claim, any right to bring any action, lawsuit, administrative proceeding, or other cause of action on my behalf. I specifically assign my right to pursue litigation against any and all insurance companies, health plans, defined benefit plans, health insurers or any entity that is or may be responsible for payment of my medical expenses based upon a refusal to pay charges.            E. Valuables: It is understood and agreed that [facility] is not liable for the damage to or loss of any money, jewelry,   documents, dentures, eye glasses, hearing aids, prosthetics, or other property of value.     F. Computer Equipment: I understand and agree that should I choose to use computer equipment owned by [facility] or if I choose to access the Internet via [facility]s network, I do so at my own risk. [facility] is not responsible for any damage to my computer equipment or to any damages of any type that might arise from my loss of equipment or data.     G.  Acceptance of Financial Responsibility:  I agree that in consideration of the services and   supplies that have been   or will be furnished to the patient, I am hereby obligated to pay all charges made for or on the account of the patient according to the standard rates (in effect at the time the services and supplies are delivered) established by [facility], including its Patient Financial Assistance Policy to the extent it is applicable. I understand that I am responsible for all charges, or portions thereof, not covered by insurance or other sources. Patient refunds will be distributed only after balances at all [facility] facilities are paid.     H. Communication Authorization:  I hereby authorize [facility] and its representatives, along with any billing service   or  who may work on their behalf, to contact me on   my cell phone and/or home phone using pre- recorded messages, artificial voice messages, automatic telephone dialing devices or other computer assisted technology, or by electronic      mail, text messaging, or by any other form of electronic communication. This includes, but is not limited to, appointment reminders, yearly physical exam reminders, preventive care reminders, patient campaigns, welcome calls, and calls about account balances on my account or any account on which I am listed as a guarantor. I understand I have the right to opt out of these communications at any time.      Relationship  Between  Facility and  Provider:      I understand that some, but not all, providers furnishing services to the patient are not employees or agents of [facility]. The patient is under the care and supervision of his/her attending physician, and it is the responsibility of the facility and its nursing staff to carry out the instructions of such physicians. It is the responsibility of the patient's physician/designee to obtain the patient's informed consent, when required, for medical or surgical  treatment, special diagnostic or therapeutic procedures, or hospital services rendered for the patient under the special instructions of the physician/designee.           REGISTRATION AUTHORIZATION  Form No. 08080 (Rev. 3/25/2024)    Page 2 of 3                       Immunizations: [facility] shares immunization information with state sponsored health departments to help you and your doctor keep track of your immunization records. By signing, you consent to have this information shared with the health department in your state:                                [place] - LINKS ([place] Immunization Network for Kids Statewide)                                [place] - MIIX ([place] Immunization Information eXchange)                                [place] - ImmPRINT (Immunization Patient Registry with Integrated Technology)     TERM: This authorization is valid for this and subsequent care/treatment I receive at [facility] and will remain valid unless/until revoked in writing by me.     [facility]: As used in this document, [facility] means all [facility] owned and managed facilities, including, but not limited to, all health centers, surgery centers, clinics, urgent care centers, and hospitals.         [facility] complies with applicable Federal civil rights laws and does not discriminate on the basis of race, color, national origin, age, disability, or sex.  ATENCIÓN: si habla español, tiene a miramontes disposición servicios gratuitos de asistencia lingüística. Wallace tom [phone].  CHÚ Ý: N?u b?n nói Ti?ng Vi?t, có các d?ch v? h? tr? ngôn ng? mi?n phí dành cho b?n. G?i s? [phone].        REGISTRATION AUTHORIZATION  Form No. 05812 (Rev. 3/25/2024)   Page 3 of 3

## 2025-04-23 NOTE — PROGRESS NOTES
Population Health. Out Reach. Reviewing patient's chart for quality metrics. I contacted patient re: hm. Discussed pcp appt 4/25/25 at 10:20 am, applaud pt on obtain labs, dm eye appt 2/2025, hm due and importance of health screenings. Pt voices understanding and appreciation.     Health Maintenance Topic(s) Outreach Outcomes & Actions Taken:    Lab(s) - Outreach Outcomes & Actions Taken  : pt did labs    Diabetic Foot Exam - Outreach Outcomes & Actions Taken  : due for dm foot exam, pt to discuss with pcp at appt     Breast Cancer Screening - Outreach Outcomes & Actions Taken  : External Records Requested & Care Team Updated if Applicable and pt reports she did mmg a [facility] in [place] on this week, tristan to [facility] sent to central team.         Additional Notes:  Pcp appt: lv- 10/15/24, nv- 4/25/25 at 10:20 am    Update/review care team, care everywhere, care gap, imm query, media and appt         Care Management, Digital Medicine, and/or Education Referrals      Not eligible for digital medicine

## 2025-04-25 ENCOUNTER — OFFICE VISIT (OUTPATIENT)
Dept: INTERNAL MEDICINE | Facility: CLINIC | Age: 65
End: 2025-04-25
Payer: COMMERCIAL

## 2025-04-25 VITALS
RESPIRATION RATE: 18 BRPM | OXYGEN SATURATION: 99 % | BODY MASS INDEX: 38.24 KG/M2 | SYSTOLIC BLOOD PRESSURE: 114 MMHG | DIASTOLIC BLOOD PRESSURE: 62 MMHG | HEART RATE: 75 BPM | HEIGHT: 64 IN | WEIGHT: 224 LBS

## 2025-04-25 DIAGNOSIS — I48.20 CHRONIC A-FIB: Primary | ICD-10-CM

## 2025-04-25 DIAGNOSIS — E11.9 TYPE 2 DIABETES MELLITUS WITHOUT COMPLICATION, WITHOUT LONG-TERM CURRENT USE OF INSULIN: ICD-10-CM

## 2025-04-25 RX ORDER — DILTIAZEM HYDROCHLORIDE 240 MG/1
240 CAPSULE, COATED, EXTENDED RELEASE ORAL 2 TIMES DAILY
COMMUNITY
Start: 2025-03-28

## 2025-04-25 RX ORDER — METOPROLOL SUCCINATE 100 MG/1
100 TABLET, EXTENDED RELEASE ORAL
COMMUNITY
Start: 2025-04-23

## 2025-04-25 NOTE — PROGRESS NOTES
Patient ID: Lauren Kaba is a 65 y.o. female.    Chief Complaint: Follow-up (6mo/Discuss labs )      HPI:   Patient presents here today for above reason.     Lauren is a 65-year-old female presents today for annual visit doing very well screening up-to-date labs all within normal limits down 30 lb A1c is controlled AFib is under control she is anticoagulated and rate controlled.  Otherwise doing well screening up-to-date here for follow-up    The patient's Health Maintenance was reviewed and the following appears to be due at this time:   Health Maintenance Due   Topic Date Due    Hepatitis C Screening  Never done    Foot Exam  Never done    Pneumococcal Vaccines (Age 50+) (1 of 2 - PCV) Never done    Low Dose Statin  Never done    RSV Vaccine (Age 60+ and Pregnant patients) (1 - Risk 60-74 years 1-dose series) Never done    COVID-19 Vaccine (7 - 2024-25 season) 09/01/2024    Mammogram  03/28/2025        Past Medical History:  Past Medical History:   Diagnosis Date    Arthritis     Atrial fibrillation     Diabetes mellitus     Fatigue     History of kidney stones     Obesity     Personal history of colonic polyps 09/27/2016    Sleep apnea     uses CPAP    Type I or II open fracture of distal end of left radius with routine healing, unspecified fracture morphology, subsequent encounter      Past Surgical History:   Procedure Laterality Date    BREAST SURGERY  11/2013    Reduction    CARDIAC ELECTROPHYSIOLOGY STUDY AND ABLATION      CARDIOVERSION      x 3    COLONOSCOPY W/ POLYPECTOMY  09/27/2016    FRACTURE SURGERY  08/23/23    JOINT REPLACEMENT  5/2013    LITHOTRIPSY, WITH CYSTOSCOPY  2018    OPEN REDUCTION AND INTERNAL FIXATION (ORIF) OF FRACTURE OF RADIUS Left 08/24/2023    Procedure: ORIF, FRACTURE, RADIUS;  Surgeon: Keith Goodwin DO;  Location: [facility];  Service: Orthopedics;  Laterality: Left;  Distal Radius. no block, supine hand table c arm skeletal dyamics, wash stuff    OPEN REDUCTION AND INTERNAL  "FIXATION (ORIF) OF INJURY OF WRIST Left 12/20/2023    Procedure: ORIF, WRIST;  Surgeon: Keith Goodwin DO;  Location: [facility] OR;  Service: Orthopedics;  Laterality: Left;    REMOVAL,ORTHOPEDIC HARDWARE,UPPER EXTREMITY Left 12/20/2023    Procedure: REMOVAL,ORTHOPEDIC HARDWARE,UPPER EXTREMITY;  Surgeon: Keith Goodwin DO;  Location: [facility] OR;  Service: Orthopedics;  Laterality: Left;  Left wrist hardware removal, supine. Vascular, hand table, C-arm    REVISION OF KNEE ARTHROPLASTY Right     TONSILLECTOMY  1989    TOTAL KNEE ARTHROPLASTY Right 05/2013     Review of patient's allergies indicates:   Allergen Reactions    Cipro [ciprofloxacin hcl] Hives     Medications Ordered Prior to Encounter[1]  Social History[2]  Family History   Problem Relation Name Age of Onset    Cancer Mother Mother, father, and sister     Diabetes Mother Mother, father, and sister     Arthritis Father Both parents     Heart disease Father Both parents     PONV Sister         ROS:   Comprehensive review of systems was performed and is negative except as noted above    Vitals/PE:   /62 (BP Location: Right arm, Patient Position: Sitting)   Pulse 75   Resp 18   Ht 5' 4.02" (1.626 m)   Wt 101.6 kg (224 lb)   SpO2 99%   BMI 38.43 kg/m²   Physical Exam    General: Alert and oriented, No acute distress.   Eye: Normal conjunctiva without exudate.  HENMT: Normocephalic/AT, Normal hearing, Oral mucosa is moist and pink   Neck: No goiter visualized.   Respiratory: Lungs CTAB, Respirations are non-labored, Breath sounds are equal, Symmetrical chest wall expansion.  Cardiovascular: Normal rate, Regular rhythm, No murmur, No edema.   Gastrointestinal: Non-distended.   Genitourinary: Deferred.  Musculoskeletal: Normal ROM, Normal gait, No deformities or amputations.  Integumentary: Warm, Dry, Intact. No diaphoresis, or flushing.  Neurologic: No focal deficits, Cranial Nerves II-XII are grossly intact.   Psychiatric: Cooperative, Appropriate mood & " affect, Normal judgment, Non-suicidal.    Assessment/Plan:       1. Chronic a-fib    2. Type 2 diabetes mellitus without complication, without long-term current use of insulin         Plan:  Labs wnl  Screening uptodate  Afib rate controlled,on oac  Cont current rx.   Rtc 6mo      Education and counseling done face to face regarding medical conditions and plan. Contact office if new symptoms develop. Should any symptoms ever significantly worsen seek emergency medical attention/go to ER. Follow up at least yearly for wellness or sooner PRN. Nurse to call patient with any results. The patient is receptive, expresses understanding and is agreeable to plan. All questions have been answered.    No follow-ups on file.             [1]   Current Outpatient Medications on File Prior to Visit   Medication Sig Dispense Refill    ACCU-CHEK GUIDE TEST STRIPS Strp USE TO TEST BLOOD SUGAR TWICE DAILY 100 strip 9    acetaminophen (TYLENOL) 500 MG tablet Take 1,000 mg by mouth every 6 (six) hours as needed for Pain.      apixaban (ELIQUIS) 5 mg Tab Take 5 mg by mouth 2 (two) times daily.      ascorbic acid, vitamin C, (VITAMIN C) 500 MG tablet Take 500 mg by mouth once daily.      Bifidobacterium infantis (ALIGN ORAL) Take 1 capsule by mouth once daily.      calcium carb/vit D3/minerals (CALCIUM-VITAMIN D ORAL) Take by mouth.      celecoxib (CELEBREX) 200 MG capsule TAKE ONE Capsule BY MOUTH ONCE DAILY 30 capsule 5    diltiaZEM (CARDIZEM CD) 240 MG 24 hr capsule Take 240 mg by mouth 2 (two) times daily.      fexofenadine (ALLEGRA) 180 MG tablet Take 180 mg by mouth every evening.      fluticasone propionate (FLONASE) 50 mcg/actuation nasal spray 2 sprays by Each Nostril route nightly as needed for Allergies or Rhinitis.      glucosamine-chondroitin 500-400 mg Cap Take by mouth.      metoprolol succinate (TOPROL-XL) 100 MG 24 hr tablet Take 100 mg by mouth.      ondansetron (ZOFRAN) 4 MG tablet Take 1 tablet (4 mg total) by mouth  every 6 (six) hours as needed for Nausea. 12 tablet 0    tirzepatide (MOUNJARO) 12.5 mg/0.5 mL PnIj INJECT 12.5 MG INTO THE SKIN EVERY 7 DAYS. 2 mL 0    vitamin D (VITAMIN D3) 1000 units Tab Take 1,000 Units by mouth once daily.      [DISCONTINUED] diltiaZEM (CARDIZEM CD) 120 MG Cp24 Take 240 mg by mouth 2 (two) times a day.      [DISCONTINUED] folic acid/multivit-min/lutein (CENTRUM SILVER ORAL) Take 1 tablet by mouth once daily.      [DISCONTINUED] glucosamine HCl (GLUCOSAMINE, BULK, MISC) Take by mouth 2 (two) times a day.      [DISCONTINUED] metFORMIN (GLUCOPHAGE-XR) 500 MG ER 24hr tablet TAKE ONE TABLET BY MOUTH TWICE DAILY 60 tablet 4    [DISCONTINUED] omega 3-dha-epa-fish oil 1,200 (144-216) mg Cap Take 1 capsule by mouth once daily.      [DISCONTINUED] sotaloL (BETAPACE) 160 MG Tab Take 160 mg by mouth 2 (two) times daily.       Current Facility-Administered Medications on File Prior to Visit   Medication Dose Route Frequency Provider Last Rate Last Admin    sodium chloride 0.9% flush 10 mL  10 mL Intravenous PRN Yolanda Santamaria PA-C       [2]   Social History  Socioeconomic History    Marital status:    Tobacco Use    Smoking status: Never    Smokeless tobacco: Never   Substance and Sexual Activity    Alcohol use: Never    Drug use: Never    Sexual activity: Not Currently     Social Drivers of Health     Financial Resource Strain: Low Risk  (4/22/2025)    Overall Financial Resource Strain (CARDIA)     Difficulty of Paying Living Expenses: Not hard at all   Food Insecurity: No Food Insecurity (4/22/2025)    Hunger Vital Sign     Worried About Running Out of Food in the Last Year: Never true     Ran Out of Food in the Last Year: Never true   Transportation Needs: No Transportation Needs (4/22/2025)    PRAPARE - Transportation     Lack of Transportation (Medical): No     Lack of Transportation (Non-Medical): No   Physical Activity: Inactive (4/22/2025)    Exercise Vital Sign     Days of Exercise  per Week: 0 days     Minutes of Exercise per Session: 0 min   Stress: No Stress Concern Present (4/22/2025)    [place] [employer] - Occupational Stress Questionnaire     Feeling of Stress : Not at all   Housing Stability: Low Risk  (4/22/2025)    Housing Stability Vital Sign     Unable to Pay for Housing in the Last Year: No     Homeless in the Last Year: No

## 2025-04-25 NOTE — LETTER
AUTHORIZATION FOR RELEASE OF   CONFIDENTIAL INFORMATION    Dear Staff,    We are seeing Lauren Kaba, date of birth 1960, in the clinic at [place] [address] [facility]. Roni Mckeon II, MD is the patient's PCP. Lauren Kaba has an outstanding lab/procedure at the time we reviewed her chart. In order to help keep her health information updated, she has authorized us to request the following medical record(s):        (X  )  MAMMOGRAM                                      (  )  COLONOSCOPY      (  )  PAP SMEAR                                          (  )  OUTSIDE LAB RESULTS     (  )  DEXA SCAN                                          (  )  EYE EXAM            (  )  FOOT EXAM                                          (  )  ENTIRE RECORD     (  )  OUTSIDE IMMUNIZATIONS                 (  )  _______________         Please fax records to [facility], Chast[place], Bradley J II, MD, [phone]               Patient Name: Lauren Kaba  : 1960  Patient Phone #: [phone]

## 2025-05-07 DIAGNOSIS — E11.9 TYPE 2 DIABETES MELLITUS WITHOUT COMPLICATION, UNSPECIFIED WHETHER LONG TERM INSULIN USE: ICD-10-CM

## 2025-05-08 RX ORDER — TIRZEPATIDE 12.5 MG/.5ML
12.5 INJECTION, SOLUTION SUBCUTANEOUS
Qty: 4 PEN | Refills: 0 | Status: SHIPPED | OUTPATIENT
Start: 2025-05-08

## 2025-05-21 DIAGNOSIS — M77.9 INFLAMMATION AROUND JOINT: ICD-10-CM

## 2025-05-21 RX ORDER — CELECOXIB 200 MG/1
200 CAPSULE ORAL
Qty: 30 CAPSULE | Refills: 5 | Status: SHIPPED | OUTPATIENT
Start: 2025-05-21

## 2025-06-16 ENCOUNTER — TELEPHONE (OUTPATIENT)
Dept: INTERNAL MEDICINE | Facility: CLINIC | Age: 65
End: 2025-06-16
Payer: COMMERCIAL

## 2025-06-16 NOTE — TELEPHONE ENCOUNTER
Copied from CRM #[id number]. Topic: General Inquiry - Patient Advice  >> Jun 16, 2025  4:05 PM Emilia wrote:  Who Called: [facility] center    Caller is requesting assistance/information from provider's office.    Symptoms (please be specific): n/a   How long has patient had these symptoms:  n/a  List of preferred pharmacies on file (remove unneeded): n/a      Preferred Method of Contact: Phone Call  Patient's Preferred Phone Number on File: [phone]   Best Call Back Number, if different:[phone]  Additional Information: Caller requesting new prolia orders for pt.

## 2025-06-17 ENCOUNTER — INFUSION (OUTPATIENT)
Dept: INFUSION THERAPY | Facility: HOSPITAL | Age: 65
End: 2025-06-17
Attending: INTERNAL MEDICINE
Payer: COMMERCIAL

## 2025-06-17 DIAGNOSIS — M81.0 AGE-RELATED OSTEOPOROSIS WITHOUT CURRENT PATHOLOGICAL FRACTURE: Primary | ICD-10-CM

## 2025-06-17 PROCEDURE — 63600175 PHARM REV CODE 636 W HCPCS: Mod: JZ,TB | Performed by: INTERNAL MEDICINE

## 2025-06-17 PROCEDURE — 96372 THER/PROPH/DIAG INJ SC/IM: CPT

## 2025-06-17 RX ADMIN — DENOSUMAB 60 MG: 60 INJECTION SUBCUTANEOUS at 03:06

## 2025-07-02 DIAGNOSIS — E11.9 TYPE 2 DIABETES MELLITUS WITHOUT COMPLICATION, UNSPECIFIED WHETHER LONG TERM INSULIN USE: ICD-10-CM

## 2025-07-02 RX ORDER — TIRZEPATIDE 12.5 MG/.5ML
12.5 INJECTION, SOLUTION SUBCUTANEOUS
Qty: 4 PEN | Refills: 1 | Status: SHIPPED | OUTPATIENT
Start: 2025-07-02

## 2025-09-05 DIAGNOSIS — E11.9 TYPE 2 DIABETES MELLITUS WITHOUT COMPLICATION, UNSPECIFIED WHETHER LONG TERM INSULIN USE: ICD-10-CM

## 2025-09-05 RX ORDER — TIRZEPATIDE 12.5 MG/.5ML
12.5 INJECTION, SOLUTION SUBCUTANEOUS
Qty: 4 PEN | Refills: 3 | Status: SHIPPED | OUTPATIENT
Start: 2025-09-05

## (undated) DEVICE — SCREW T10 SHAFT DRIVER N CANN

## (undated) DEVICE — DRILL GEMINUS 2.3X40MM

## (undated) DEVICE — DRESSING XEROFORM FOIL PK 1X8

## (undated) DEVICE — BANDAGE ACE NON LATEX 3IN

## (undated) DEVICE — Device

## (undated) DEVICE — COVER FULLGUARD SHOE HIGH-TOP

## (undated) DEVICE — DRESSING GAUZE XEROFORM 5X9

## (undated) DEVICE — GOWN SMARTGOWN 3XL XLONG

## (undated) DEVICE — COVER EQUIPMENT 36X25

## (undated) DEVICE — SUT ETHILON 2-0 FSLX 30 BLK

## (undated) DEVICE — GLOVE PROTEXIS HYDROGEL SZ9

## (undated) DEVICE — TAPE SILK 3IN

## (undated) DEVICE — ELECTRODE REM POLYHESIVE II

## (undated) DEVICE — SUT ETHILON 3-0 FS-1 30

## (undated) DEVICE — ELECTRODE PATIENT RETURN DISP

## (undated) DEVICE — SUT VICRYL BR 1 GEN 27 CT-1

## (undated) DEVICE — GLOVE PROTEXIS NEU-THERA SZ6

## (undated) DEVICE — SUT MCRYL PLUS 2-0 CT-1 36IN

## (undated) DEVICE — DRIVER, AO CONNECTION, SQUARE TIP 2.0MM

## (undated) DEVICE — SUT ETHILON 3-0 PS2 18 BLK

## (undated) DEVICE — COVER TABLE HVY DTY 60X90IN

## (undated) DEVICE — DRILL SURG GEMINUS 40X2MM

## (undated) DEVICE — BANDAGE ESMARK 4INX3YD

## (undated) DEVICE — GLOVE PROTEXIS BLUE LATEX 9

## (undated) DEVICE — DRAPE HAND STERILE

## (undated) DEVICE — APPLICATOR CHLORAPREP ORN 26ML

## (undated) DEVICE — GAUZE SPONGE 4X4 12PLY

## (undated) DEVICE — SUT VICRYL 2-0 8-18 CP-2

## (undated) DEVICE — DRESSING XEROFORM 5X9IN

## (undated) DEVICE — GLOVE PROTEXIS HYDROGEL SZ6

## (undated) DEVICE — SOL NACL IRR 1000ML BTL

## (undated) DEVICE — PADDING WYTEX UNDRCST 2INX4YD

## (undated) DEVICE — CUFF ATS 2 PORT SNGL BLDR 18IN

## (undated) DEVICE — DRAPE STERI U-SHAPED 47X51IN

## (undated) DEVICE — KIT SURGICAL TURNOVER

## (undated) DEVICE — SPONGE GAUZE 4X4 12 PLY STRL

## (undated) DEVICE — DRIVER SQUARE TIP 2.0MM